# Patient Record
Sex: MALE | Race: WHITE | NOT HISPANIC OR LATINO | Employment: FULL TIME | URBAN - METROPOLITAN AREA
[De-identification: names, ages, dates, MRNs, and addresses within clinical notes are randomized per-mention and may not be internally consistent; named-entity substitution may affect disease eponyms.]

---

## 2017-02-02 ENCOUNTER — HOSPITAL ENCOUNTER (OUTPATIENT)
Dept: LAB | Facility: HOSPITAL | Age: 62
Discharge: HOME | End: 2017-02-02
Attending: INTERNAL MEDICINE | Admitting: INTERNAL MEDICINE

## 2017-05-31 ENCOUNTER — HOSPITAL ENCOUNTER (OUTPATIENT)
Dept: LAB | Facility: HOSPITAL | Age: 62
Discharge: HOME | End: 2017-05-31
Attending: INTERNAL MEDICINE | Admitting: INTERNAL MEDICINE

## 2017-05-31 LAB
CHOLEST SERPL-MCNC: 126 MG/DL
CHOLEST/HDLC SERPL: 5.3 {RATIO}
CONV LDL CHOLESTEROL DIRECT: 85 MG/DL (ref 0–100)
HDLC SERPL-MCNC: 24 MG/DL
LDLC/HDLC SERPL: 3.5 {RATIO}
LIPID INTERPRETATION: ABNORMAL
TRIGL SERPL-MCNC: 173 MG/DL
VLDLC SERPL CALC-MCNC: 17.4 MG/DL

## 2017-08-08 ENCOUNTER — HOSPITAL ENCOUNTER (OUTPATIENT)
Dept: HOSPITAL 83 - RESCLI | Age: 62
Discharge: HOME | End: 2017-08-08
Attending: INTERNAL MEDICINE
Payer: COMMERCIAL

## 2017-08-08 DIAGNOSIS — R07.89: Primary | ICD-10-CM

## 2017-10-19 ENCOUNTER — HOSPITAL ENCOUNTER (OUTPATIENT)
Dept: LAB | Facility: HOSPITAL | Age: 62
Discharge: HOME | End: 2017-10-19
Attending: INTERNAL MEDICINE | Admitting: INTERNAL MEDICINE

## 2017-11-22 ENCOUNTER — HOSPITAL ENCOUNTER (OUTPATIENT)
Dept: OTHER | Facility: HOSPITAL | Age: 62
Setting detail: SPECIMEN
Discharge: HOME | End: 2017-11-22
Attending: INTERNAL MEDICINE | Admitting: INTERNAL MEDICINE

## 2017-11-22 ENCOUNTER — ON CAMPUS - OUTPATIENT (AMBULATORY)
Dept: URBAN - METROPOLITAN AREA HOSPITAL 2 | Facility: HOSPITAL | Age: 62
End: 2017-11-22
Payer: COMMERCIAL

## 2017-11-22 ENCOUNTER — OFFICE (AMBULATORY)
Dept: URBAN - METROPOLITAN AREA CLINIC 64 | Facility: CLINIC | Age: 62
End: 2017-11-22
Payer: COMMERCIAL

## 2017-11-22 VITALS
HEART RATE: 69 BPM | DIASTOLIC BLOOD PRESSURE: 60 MMHG | HEART RATE: 63 BPM | OXYGEN SATURATION: 98 % | WEIGHT: 203 LBS | HEART RATE: 74 BPM | SYSTOLIC BLOOD PRESSURE: 130 MMHG | SYSTOLIC BLOOD PRESSURE: 123 MMHG | OXYGEN SATURATION: 97 % | DIASTOLIC BLOOD PRESSURE: 67 MMHG | OXYGEN SATURATION: 95 % | SYSTOLIC BLOOD PRESSURE: 98 MMHG | OXYGEN SATURATION: 96 % | OXYGEN SATURATION: 99 % | DIASTOLIC BLOOD PRESSURE: 85 MMHG | OXYGEN SATURATION: 93 % | HEIGHT: 65 IN | SYSTOLIC BLOOD PRESSURE: 121 MMHG | TEMPERATURE: 97.3 F | OXYGEN SATURATION: 94 % | HEART RATE: 73 BPM | SYSTOLIC BLOOD PRESSURE: 127 MMHG | SYSTOLIC BLOOD PRESSURE: 124 MMHG | DIASTOLIC BLOOD PRESSURE: 80 MMHG | SYSTOLIC BLOOD PRESSURE: 107 MMHG | DIASTOLIC BLOOD PRESSURE: 81 MMHG | DIASTOLIC BLOOD PRESSURE: 76 MMHG | SYSTOLIC BLOOD PRESSURE: 103 MMHG | DIASTOLIC BLOOD PRESSURE: 69 MMHG | HEART RATE: 72 BPM | SYSTOLIC BLOOD PRESSURE: 114 MMHG | RESPIRATION RATE: 18 BRPM | HEART RATE: 70 BPM | DIASTOLIC BLOOD PRESSURE: 73 MMHG | HEART RATE: 65 BPM | RESPIRATION RATE: 16 BRPM

## 2017-11-22 DIAGNOSIS — D12.0 BENIGN NEOPLASM OF CECUM: ICD-10-CM

## 2017-11-22 DIAGNOSIS — K62.89 OTHER SPECIFIED DISEASES OF ANUS AND RECTUM: ICD-10-CM

## 2017-11-22 DIAGNOSIS — K57.30 DIVERTICULOSIS OF LARGE INTESTINE WITHOUT PERFORATION OR ABS: ICD-10-CM

## 2017-11-22 DIAGNOSIS — K64.8 OTHER HEMORRHOIDS: ICD-10-CM

## 2017-11-22 DIAGNOSIS — D12.8 BENIGN NEOPLASM OF RECTUM: ICD-10-CM

## 2017-11-22 DIAGNOSIS — Z12.11 ENCOUNTER FOR SCREENING FOR MALIGNANT NEOPLASM OF COLON: ICD-10-CM

## 2017-11-22 PROBLEM — K62.1 RECTAL POLYP: Status: ACTIVE | Noted: 2017-11-22

## 2017-11-22 LAB
GI HISTOLOGY: A. UNSPECIFIED: (no result)
GI HISTOLOGY: B. UNSPECIFIED: (no result)
GI HISTOLOGY: PDF REPORT: (no result)

## 2017-11-22 PROCEDURE — 45385 COLONOSCOPY W/LESION REMOVAL: CPT | Mod: 33 | Performed by: INTERNAL MEDICINE

## 2017-11-22 PROCEDURE — 88305 TISSUE EXAM BY PATHOLOGIST: CPT | Performed by: INTERNAL MEDICINE

## 2017-11-22 RX ADMIN — PROPOFOL: 10 INJECTION, EMULSION INTRAVENOUS at 15:10

## 2018-04-18 ENCOUNTER — HOSPITAL ENCOUNTER (OUTPATIENT)
Dept: LAB | Facility: HOSPITAL | Age: 63
Discharge: HOME | End: 2018-04-18
Attending: INTERNAL MEDICINE | Admitting: INTERNAL MEDICINE

## 2018-04-18 LAB
CHOLEST SERPL-MCNC: 132 MG/DL
CHOLEST/HDLC SERPL: 6.3 {RATIO}
CONV LDL CHOLESTEROL DIRECT: 91 MG/DL (ref 0–100)
CONV MICROALBUM.,U,RANDOM: <2 MG/L
HDLC SERPL-MCNC: 21 MG/DL
LDLC/HDLC SERPL: 4.3 {RATIO}
LIPID INTERPRETATION: ABNORMAL
TRIGL SERPL-MCNC: 193 MG/DL
VLDLC SERPL CALC-MCNC: 20.3 MG/DL

## 2018-10-04 ENCOUNTER — HOSPITAL ENCOUNTER (OUTPATIENT)
Dept: LAB | Facility: HOSPITAL | Age: 63
Discharge: HOME | End: 2018-10-04
Attending: INTERNAL MEDICINE | Admitting: INTERNAL MEDICINE

## 2018-10-05 LAB — HBA1C MFR BLD: 8.3 % (ref 0–5.6)

## 2018-11-27 ENCOUNTER — HOSPITAL ENCOUNTER (OUTPATIENT)
Dept: HOSPITAL 83 - RESCLI | Age: 63
Discharge: HOME | End: 2018-11-27
Attending: EMERGENCY MEDICINE
Payer: COMMERCIAL

## 2018-11-27 DIAGNOSIS — Z23: Primary | ICD-10-CM

## 2019-01-04 ENCOUNTER — HOSPITAL ENCOUNTER (OUTPATIENT)
Dept: HOSPITAL 83 - RESCLI | Age: 64
Discharge: HOME | End: 2019-01-04
Attending: INTERNAL MEDICINE
Payer: COMMERCIAL

## 2019-01-04 DIAGNOSIS — K21.9: ICD-10-CM

## 2019-01-04 DIAGNOSIS — R19.7: ICD-10-CM

## 2019-01-04 DIAGNOSIS — M19.90: ICD-10-CM

## 2019-01-04 DIAGNOSIS — Z79.899: ICD-10-CM

## 2019-01-04 DIAGNOSIS — E78.5: ICD-10-CM

## 2019-01-04 DIAGNOSIS — F32.0: ICD-10-CM

## 2019-01-04 DIAGNOSIS — E11.9: ICD-10-CM

## 2019-01-04 DIAGNOSIS — Z88.8: ICD-10-CM

## 2019-01-04 DIAGNOSIS — I10: Primary | ICD-10-CM

## 2019-01-04 DIAGNOSIS — E03.9: ICD-10-CM

## 2019-01-04 DIAGNOSIS — Z86.73: ICD-10-CM

## 2019-01-04 DIAGNOSIS — E66.9: ICD-10-CM

## 2019-03-14 ENCOUNTER — HOSPITAL ENCOUNTER (OUTPATIENT)
Dept: LAB | Facility: HOSPITAL | Age: 64
Discharge: HOME | End: 2019-03-14
Attending: INTERNAL MEDICINE | Admitting: INTERNAL MEDICINE

## 2019-03-14 LAB
ALBUMIN SERPL-MCNC: 4.2 G/DL (ref 3.5–4.8)
ALBUMIN/GLOB SERPL: 1.4 {RATIO} (ref 1–1.7)
ALP SERPL-CCNC: 65 IU/L (ref 32–91)
ALT SERPL-CCNC: 41 IU/L (ref 17–63)
ANION GAP SERPL CALC-SCNC: 13.9 MMOL/L (ref 10–20)
AST SERPL-CCNC: 30 IU/L (ref 15–41)
BILIRUB SERPL-MCNC: 0.7 MG/DL (ref 0.3–1.2)
BUN SERPL-MCNC: 24 MG/DL (ref 8–20)
BUN/CREAT SERPL: 21.8 (ref 6.2–20.3)
CALCIUM SERPL-MCNC: 9.3 MG/DL (ref 8.9–10.3)
CHLORIDE SERPL-SCNC: 104 MMOL/L (ref 101–111)
CHOLEST SERPL-MCNC: 153 MG/DL
CHOLEST/HDLC SERPL: 5.5 {RATIO}
CONV CO2: 23 MMOL/L (ref 22–32)
CONV LDL CHOLESTEROL DIRECT: 113 MG/DL (ref 0–100)
CONV MICROALBUM.,U,RANDOM: <2 MG/L
CONV TOTAL PROTEIN: 7.2 G/DL (ref 6.1–7.9)
CREAT UR-MCNC: 1.1 MG/DL (ref 0.7–1.2)
GLOBULIN UR ELPH-MCNC: 3 G/DL (ref 2.5–3.8)
GLUCOSE SERPL-MCNC: 134 MG/DL (ref 65–99)
HDLC SERPL-MCNC: 28 MG/DL
LDLC/HDLC SERPL: 4.1 {RATIO}
LIPID INTERPRETATION: ABNORMAL
POTASSIUM SERPL-SCNC: 3.9 MMOL/L (ref 3.6–5.1)
SODIUM SERPL-SCNC: 137 MMOL/L (ref 136–144)
TRIGL SERPL-MCNC: 149 MG/DL
VLDLC SERPL CALC-MCNC: 11.8 MG/DL

## 2019-05-11 ENCOUNTER — HOSPITAL ENCOUNTER (OUTPATIENT)
Dept: HOSPITAL 83 - LAB | Age: 64
Discharge: HOME | End: 2019-05-11
Attending: INTERNAL MEDICINE
Payer: COMMERCIAL

## 2019-05-11 DIAGNOSIS — E11.9: Primary | ICD-10-CM

## 2019-05-11 LAB
ALBUMIN SERPL-MCNC: 3.9 GM/DL (ref 3.1–4.5)
ALP SERPL-CCNC: 111 U/L (ref 45–117)
ALT SERPL W P-5'-P-CCNC: 18 U/L (ref 12–78)
AST SERPL-CCNC: 9 IU/L (ref 3–35)
BASOPHILS # BLD AUTO: 0 10*3/UL (ref 0–0.1)
BASOPHILS NFR BLD AUTO: 0.9 % (ref 0–1)
BUN SERPL-MCNC: 23 MG/DL (ref 7–24)
CHLORIDE SERPL-SCNC: 109 MMOL/L (ref 98–107)
CHOLEST SERPL-MCNC: 135 MG/DL (ref ?–200)
CREAT SERPL-MCNC: 1.16 MG/DL (ref 0.7–1.3)
EOSINOPHIL # BLD AUTO: 0.2 10*3/UL (ref 0–0.4)
EOSINOPHIL # BLD AUTO: 4.3 % (ref 1–4)
ERYTHROCYTE [DISTWIDTH] IN BLOOD BY AUTOMATED COUNT: 12.9 % (ref 0–14.5)
HCT VFR BLD AUTO: 39 % (ref 42–52)
HDLC SERPL-MCNC: 47 MG/DL (ref 40–60)
HGB BLD-MCNC: 13.1 G/DL (ref 14–18)
LDLC SERPL DIRECT ASSAY-MCNC: 65 MG/DL (ref 9–159)
LYMPHOCYTES # BLD AUTO: 0.7 10*3/UL (ref 1.3–4.4)
LYMPHOCYTES NFR BLD AUTO: 14.8 % (ref 27–41)
MCH RBC QN AUTO: 29.6 PG (ref 27–31)
MCHC RBC AUTO-ENTMCNC: 33.6 G/DL (ref 33–37)
MCV RBC AUTO: 88 FL (ref 80–94)
MONOCYTES # BLD AUTO: 0.4 10*3/UL (ref 0.1–1)
MONOCYTES NFR BLD MANUAL: 7.7 % (ref 3–9)
NEUT #: 3.4 10*3/UL (ref 2.3–7.9)
NEUT %: 72.1 % (ref 47–73)
NRBC BLD QL AUTO: 0 10*3/UL (ref 0–0)
PLATELET # BLD AUTO: 201 10*3/UL (ref 130–400)
PMV BLD AUTO: 10.1 FL (ref 9.6–12.3)
POTASSIUM SERPL-SCNC: 4.6 MMOL/L (ref 3.5–5.1)
PROT SERPL-MCNC: 7.2 GM/DL (ref 6.4–8.2)
RBC # BLD AUTO: 4.43 10*6/UL (ref 4.5–5.9)
SODIUM SERPL-SCNC: 140 MMOL/L (ref 136–145)
TRIGL SERPL-MCNC: 115 MG/DL (ref ?–150)
VLDLC SERPL CALC-MCNC: 23 MG/DL (ref 6–40)
WBC NRBC COR # BLD AUTO: 4.7 10*3/UL (ref 4.8–10.8)

## 2019-05-21 ENCOUNTER — HOSPITAL ENCOUNTER (OUTPATIENT)
Dept: HOSPITAL 83 - RESCLI | Age: 64
Discharge: HOME | End: 2019-05-21
Attending: INTERNAL MEDICINE
Payer: COMMERCIAL

## 2019-05-21 DIAGNOSIS — Z88.8: ICD-10-CM

## 2019-05-21 DIAGNOSIS — R47.81: ICD-10-CM

## 2019-05-21 DIAGNOSIS — E03.9: Primary | ICD-10-CM

## 2019-05-21 DIAGNOSIS — R07.9: ICD-10-CM

## 2019-05-21 DIAGNOSIS — M19.90: ICD-10-CM

## 2019-05-21 DIAGNOSIS — Z91.048: ICD-10-CM

## 2019-05-21 DIAGNOSIS — I10: ICD-10-CM

## 2019-05-21 DIAGNOSIS — Z79.899: ICD-10-CM

## 2019-05-21 DIAGNOSIS — F32.0: ICD-10-CM

## 2019-05-21 DIAGNOSIS — E66.9: ICD-10-CM

## 2019-05-21 DIAGNOSIS — Z86.73: ICD-10-CM

## 2019-05-21 DIAGNOSIS — E11.9: ICD-10-CM

## 2019-05-21 DIAGNOSIS — J32.9: ICD-10-CM

## 2019-08-28 ENCOUNTER — APPOINTMENT (OUTPATIENT)
Dept: CT IMAGING | Facility: HOSPITAL | Age: 64
End: 2019-08-28

## 2019-08-28 ENCOUNTER — HOSPITAL ENCOUNTER (OUTPATIENT)
Dept: HOSPITAL 83 - RESCLI | Age: 64
Discharge: HOME | End: 2019-08-28
Attending: INTERNAL MEDICINE
Payer: COMMERCIAL

## 2019-08-28 ENCOUNTER — HOSPITAL ENCOUNTER (EMERGENCY)
Facility: HOSPITAL | Age: 64
Discharge: HOME | End: 2019-08-28
Admitting: EMERGENCY MEDICINE

## 2019-08-28 VITALS
RESPIRATION RATE: 14 BRPM | DIASTOLIC BLOOD PRESSURE: 68 MMHG | TEMPERATURE: 97.8 F | WEIGHT: 214.51 LBS | SYSTOLIC BLOOD PRESSURE: 144 MMHG | OXYGEN SATURATION: 98 % | BODY MASS INDEX: 35.74 KG/M2 | HEART RATE: 84 BPM | HEIGHT: 65 IN

## 2019-08-28 DIAGNOSIS — E03.9: ICD-10-CM

## 2019-08-28 DIAGNOSIS — M19.90: ICD-10-CM

## 2019-08-28 DIAGNOSIS — K21.9: ICD-10-CM

## 2019-08-28 DIAGNOSIS — F32.0: ICD-10-CM

## 2019-08-28 DIAGNOSIS — Z86.73: ICD-10-CM

## 2019-08-28 DIAGNOSIS — M54.41 ACUTE LOW BACK PAIN WITH RIGHT-SIDED SCIATICA, UNSPECIFIED BACK PAIN LATERALITY: Primary | ICD-10-CM

## 2019-08-28 DIAGNOSIS — J32.9: ICD-10-CM

## 2019-08-28 DIAGNOSIS — I10: ICD-10-CM

## 2019-08-28 DIAGNOSIS — E11.9: Primary | ICD-10-CM

## 2019-08-28 DIAGNOSIS — Z79.899: ICD-10-CM

## 2019-08-28 PROCEDURE — 99283 EMERGENCY DEPT VISIT LOW MDM: CPT

## 2019-08-28 PROCEDURE — 72131 CT LUMBAR SPINE W/O DYE: CPT

## 2019-08-28 PROCEDURE — 25010000002 METHYLPREDNISOLONE PER 125 MG

## 2019-08-28 PROCEDURE — 25010000002 KETOROLAC TROMETHAMINE PER 15 MG: Performed by: PHYSICIAN ASSISTANT

## 2019-08-28 PROCEDURE — 96372 THER/PROPH/DIAG INJ SC/IM: CPT

## 2019-08-28 RX ORDER — LIDOCAINE 50 MG/G
1 PATCH TOPICAL EVERY 24 HOURS
Qty: 6 EACH | Refills: 0 | Status: SHIPPED | OUTPATIENT
Start: 2019-08-28 | End: 2019-10-01

## 2019-08-28 RX ORDER — METHYLPREDNISOLONE SODIUM SUCCINATE 125 MG/2ML
125 INJECTION, POWDER, LYOPHILIZED, FOR SOLUTION INTRAMUSCULAR; INTRAVENOUS ONCE
Status: COMPLETED | OUTPATIENT
Start: 2019-08-28 | End: 2019-08-28

## 2019-08-28 RX ORDER — CEPHALEXIN 500 MG/1
500 CAPSULE ORAL 2 TIMES DAILY
COMMUNITY
End: 2020-09-14

## 2019-08-28 RX ORDER — NATEGLINIDE 120 MG/1
120 TABLET ORAL
COMMUNITY
End: 2020-03-17

## 2019-08-28 RX ORDER — TOPIRAMATE 50 MG/1
150 TABLET, FILM COATED ORAL 2 TIMES DAILY
COMMUNITY
End: 2021-03-22

## 2019-08-28 RX ORDER — ENALAPRIL MALEATE 20 MG/1
20 TABLET ORAL DAILY
COMMUNITY
End: 2021-03-22 | Stop reason: SDUPTHER

## 2019-08-28 RX ORDER — PREDNISONE 20 MG/1
20 TABLET ORAL DAILY
Qty: 5 TABLET | Refills: 0 | Status: SHIPPED | OUTPATIENT
Start: 2019-08-28 | End: 2019-10-01

## 2019-08-28 RX ORDER — METHOCARBAMOL 750 MG/1
750 TABLET, FILM COATED ORAL 3 TIMES DAILY PRN
Qty: 15 TABLET | Refills: 0 | Status: SHIPPED | OUTPATIENT
Start: 2019-08-28 | End: 2019-10-01

## 2019-08-28 RX ORDER — SIMVASTATIN 40 MG
40 TABLET ORAL NIGHTLY
COMMUNITY
End: 2021-03-22 | Stop reason: SDUPTHER

## 2019-08-28 RX ORDER — INSULIN DEGLUDEC 100 U/ML
80 INJECTION, SOLUTION SUBCUTANEOUS DAILY
COMMUNITY
End: 2020-03-17

## 2019-08-28 RX ORDER — KETOROLAC TROMETHAMINE 30 MG/ML
30 INJECTION, SOLUTION INTRAMUSCULAR; INTRAVENOUS ONCE
Status: COMPLETED | OUTPATIENT
Start: 2019-08-28 | End: 2019-08-28

## 2019-08-28 RX ORDER — METHYLPREDNISOLONE SODIUM SUCCINATE 125 MG/2ML
INJECTION, POWDER, LYOPHILIZED, FOR SOLUTION INTRAMUSCULAR; INTRAVENOUS
Status: COMPLETED
Start: 2019-08-28 | End: 2019-08-28

## 2019-08-28 RX ORDER — NAPROXEN 500 MG/1
500 TABLET ORAL 2 TIMES DAILY WITH MEALS
Qty: 20 TABLET | Refills: 0 | Status: SHIPPED | OUTPATIENT
Start: 2019-08-28 | End: 2019-10-01

## 2019-08-28 RX ORDER — BIOTIN 5 MG
3500 TABLET ORAL 2 TIMES DAILY
COMMUNITY
End: 2020-03-17

## 2019-08-28 RX ORDER — METHOCARBAMOL 750 MG/1
750 TABLET, FILM COATED ORAL ONCE
Status: COMPLETED | OUTPATIENT
Start: 2019-08-28 | End: 2019-08-28

## 2019-08-28 RX ADMIN — METHYLPREDNISOLONE SODIUM SUCCINATE 125 MG: 125 INJECTION, POWDER, FOR SOLUTION INTRAMUSCULAR; INTRAVENOUS at 21:10

## 2019-08-28 RX ADMIN — METHOCARBAMOL 750 MG: 750 TABLET, FILM COATED ORAL at 21:10

## 2019-08-28 RX ADMIN — KETOROLAC TROMETHAMINE 30 MG: 30 INJECTION, SOLUTION INTRAMUSCULAR at 21:10

## 2019-08-28 RX ADMIN — METHYLPREDNISOLONE SODIUM SUCCINATE 125 MG: 125 INJECTION, POWDER, LYOPHILIZED, FOR SOLUTION INTRAMUSCULAR; INTRAVENOUS at 21:10

## 2019-09-23 ENCOUNTER — HOSPITAL ENCOUNTER (OUTPATIENT)
Dept: HOSPITAL 83 - LAB | Age: 64
Discharge: HOME | End: 2019-09-23
Attending: STUDENT IN AN ORGANIZED HEALTH CARE EDUCATION/TRAINING PROGRAM
Payer: COMMERCIAL

## 2019-09-23 DIAGNOSIS — E11.9: ICD-10-CM

## 2019-09-23 DIAGNOSIS — E03.9: Primary | ICD-10-CM

## 2019-09-23 LAB
BUN SERPL-MCNC: 22 MG/DL (ref 7–24)
CHLORIDE SERPL-SCNC: 110 MMOL/L (ref 98–107)
CREAT SERPL-MCNC: 1.11 MG/DL (ref 0.7–1.3)
POTASSIUM SERPL-SCNC: 4.4 MMOL/L (ref 3.5–5.1)
SODIUM SERPL-SCNC: 141 MMOL/L (ref 136–145)

## 2019-10-01 ENCOUNTER — OFFICE VISIT (OUTPATIENT)
Dept: ORTHOPEDIC SURGERY | Facility: CLINIC | Age: 64
End: 2019-10-01

## 2019-10-01 VITALS
HEART RATE: 64 BPM | DIASTOLIC BLOOD PRESSURE: 74 MMHG | HEIGHT: 65 IN | WEIGHT: 200 LBS | BODY MASS INDEX: 33.32 KG/M2 | SYSTOLIC BLOOD PRESSURE: 116 MMHG

## 2019-10-01 DIAGNOSIS — M54.32 BILATERAL SCIATICA: ICD-10-CM

## 2019-10-01 DIAGNOSIS — M48.062 LUMBAR STENOSIS WITH NEUROGENIC CLAUDICATION: Primary | ICD-10-CM

## 2019-10-01 DIAGNOSIS — M54.31 BILATERAL SCIATICA: ICD-10-CM

## 2019-10-01 PROCEDURE — 99214 OFFICE O/P EST MOD 30 MIN: CPT | Performed by: PHYSICIAN ASSISTANT

## 2019-10-01 PROCEDURE — 96372 THER/PROPH/DIAG INJ SC/IM: CPT | Performed by: PHYSICIAN ASSISTANT

## 2019-10-01 RX ORDER — GABAPENTIN 300 MG/1
CAPSULE ORAL
Qty: 60 CAPSULE | Refills: 0 | Status: SHIPPED | OUTPATIENT
Start: 2019-10-01 | End: 2019-10-23 | Stop reason: SDUPTHER

## 2019-10-01 RX ORDER — DICLOFENAC SODIUM 75 MG/1
75 TABLET, DELAYED RELEASE ORAL 2 TIMES DAILY
Qty: 60 TABLET | Refills: 0 | Status: SHIPPED | OUTPATIENT
Start: 2019-10-01 | End: 2020-03-17

## 2019-10-01 NOTE — PATIENT INSTRUCTIONS
Back Exercises  The following exercises strengthen the muscles that help to support the back. They also help to keep the lower back flexible. Doing these exercises can help to prevent back pain or lessen existing pain.  If you have back pain or discomfort, try doing these exercises 2-3 times each day or as told by your health care provider. When the pain goes away, do them once each day, but increase the number of times that you repeat the steps for each exercise (do more repetitions). If you do not have back pain or discomfort, do these exercises once each day or as told by your health care provider.  Exercises  Single Knee to Chest  Repeat these steps 3-5 times for each le. Lie on your back on a firm bed or the floor with your legs extended.  2. Bring one knee to your chest. Your other leg should stay extended and in contact with the floor.  3. Hold your knee in place by grabbing your knee or thigh.  4. Pull on your knee until you feel a gentle stretch in your lower back.  5. Hold the stretch for 10-30 seconds.  6. Slowly release and straighten your leg.  Pelvic Tilt  Repeat these steps 5-10 times:  1. Lie on your back on a firm bed or the floor with your legs extended.  2. Bend your knees so they are pointing toward the ceiling and your feet are flat on the floor.  3. Tighten your lower abdominal muscles to press your lower back against the floor. This motion will tilt your pelvis so your tailbone points up toward the ceiling instead of pointing to your feet or the floor.  4. With gentle tension and even breathing, hold this position for 5-10 seconds.  Cat-Cow  Repeat these steps until your lower back becomes more flexible:  1. Get into a hands-and-knees position on a firm surface. Keep your hands under your shoulders, and keep your knees under your hips. You may place padding under your knees for comfort.  2. Let your head hang down, and point your tailbone toward the floor so your lower back becomes  rounded like the back of a cat.  3. Hold this position for 5 seconds.  4. Slowly lift your head and point your tailbone up toward the ceiling so your back forms a sagging arch like the back of a cow.  5. Hold this position for 5 seconds.    Press-Ups  Repeat these steps 5-10 times:  1. Lie on your abdomen (face-down) on the floor.  2. Place your palms near your head, about shoulder-width apart.  3. While you keep your back as relaxed as possible and keep your hips on the floor, slowly straighten your arms to raise the top half of your body and lift your shoulders. Do not use your back muscles to raise your upper torso. You may adjust the placement of your hands to make yourself more comfortable.  4. Hold this position for 5 seconds while you keep your back relaxed.  5. Slowly return to lying flat on the floor.    Bridges  Repeat these steps 10 times:  1. Lie on your back on a firm surface.  2. Bend your knees so they are pointing toward the ceiling and your feet are flat on the floor.  3. Tighten your buttocks muscles and lift your buttocks off of the floor until your waist is at almost the same height as your knees. You should feel the muscles working in your buttocks and the back of your thighs. If you do not feel these muscles, slide your feet 1-2 inches farther away from your buttocks.  4. Hold this position for 3-5 seconds.  5. Slowly lower your hips to the starting position, and allow your buttocks muscles to relax completely.  If this exercise is too easy, try doing it with your arms crossed over your chest.  Abdominal Crunches  Repeat these steps 5-10 times:  1. Lie on your back on a firm bed or the floor with your legs extended.  2. Bend your knees so they are pointing toward the ceiling and your feet are flat on the floor.  3. Cross your arms over your chest.  4. Tip your chin slightly toward your chest without bending your neck.  5. Tighten your abdominal muscles and slowly raise your trunk (torso) high  enough to lift your shoulder blades a tiny bit off of the floor. Avoid raising your torso higher than that, because it can put too much stress on your low back and it does not help to strengthen your abdominal muscles.  6. Slowly return to your starting position.  Back Lifts  Repeat these steps 5-10 times:  1. Lie on your abdomen (face-down) with your arms at your sides, and rest your forehead on the floor.  2. Tighten the muscles in your legs and your buttocks.  3. Slowly lift your chest off of the floor while you keep your hips pressed to the floor. Keep the back of your head in line with the curve in your back. Your eyes should be looking at the floor.  4. Hold this position for 3-5 seconds.  5. Slowly return to your starting position.  Contact a health care provider if:  · Your back pain or discomfort gets much worse when you do an exercise.  · Your back pain or discomfort does not lessen within 2 hours after you exercise.  If you have any of these problems, stop doing these exercises right away. Do not do them again unless your health care provider says that you can.  Get help right away if:  · You develop sudden, severe back pain. If this happens, stop doing the exercises right away. Do not do them again unless your health care provider says that you can.  This information is not intended to replace advice given to you by your health care provider. Make sure you discuss any questions you have with your health care provider.  Document Released: 01/25/2006 Document Revised: 07/31/2018 Document Reviewed: 02/11/2016  [employer] Interactive Patient Education © 2019 [employer]

## 2019-10-01 NOTE — PROGRESS NOTES
[facility] New patient    Patient Name: Herb Mariscal    History of Present Illness:  Herb Mariscal is a 63 y.o. year old male here today with chief complaint of had concerns including Initial Evaluation and Pain of the Lumbar Spine..2 months of increasing low back pain occ shooting pain in legs and buttock when walking or lifting. Pain gets better when he sits. No hx of back surgery. Nor change in bowel or bladder functions.no hx of cardiovasucalr or peripheral vasuclar disease.  Answers for HPI/ROS submitted by the patient on 9/25/2019   Back pain  Chronicity: new  Onset: more than 1 month ago  Frequency: constantly  Progression since onset: waxing and waning  Pain location: gluteal, sacro-iliac  Pain quality: stabbing  Radiates to: left foot, left thigh, right foot, right thigh  Pain - numeric: 9/10  Pain is: the same all the time  Aggravated by: position, standing  Stiffness is present: all day  bladder incontinence: No  bowel incontinence: No  leg pain: Yes  paresis: No  paresthesias: Yes  pelvic pain: Yes  perianal numbness: No  tingling: No  weight loss: No  Risk factors: obesity    Imaging: My interpretation of CT scan of lumbar spine is L4-S1 stenosis S1-S2 rudamentary disc.    Impression:   1. Lumbar stenosis with neurogenic claudication    2. Bilateral sciatica           Plan: Physical therapy for lumbar traction, gabapentin, diclofenac, intramuscular steroid injection, off work until Saturday.  Follow-up in 6 weeks.    Therapeutic injection of 80 mg of Demerol was performed in the office.  Patient denies change in immediate side effects.    Methylprednisolone 80 mg  NDC 8019-1545-88  LOT #[id number]  Expiration 11/20    Vitals:  Vitals:    10/01/19 0920   BP: 116/74   Pulse: 64       Patient's Family and Social History:  Family History   Problem Relation Age of Onset   • Cancer Father    • Diabetes Brother      Social History     Socioeconomic History   • Marital status:       Spouse name: Not on file   • Number of children: Not on file   • Years of education: Not on file   • Highest education level: Not on file   Tobacco Use   • Smoking status: Former Smoker   • Smokeless tobacco: Never Used   Substance and Sexual Activity   • Alcohol use: No     Frequency: Never   • Drug use: No   • Sexual activity: Defer       Patients Medical and Surgical History:  Past Medical History:   Diagnosis Date   • Diabetes mellitus (CMS/HCC)     type 2     Past Surgical History:   Procedure Laterality Date   • HAND SURGERY  1990       Review of Systems   Constitutional: Positive for activity change. Negative for fatigue.   HENT: Negative for congestion.    Eyes: Negative for visual disturbance.   Respiratory: Negative for shortness of breath.    Cardiovascular: Negative for chest pain.   Gastrointestinal: Negative for abdominal pain.   Genitourinary: Negative for dysuria and flank pain.   Musculoskeletal: Positive for arthralgias and back pain.   Skin: Negative for wound.   Neurological: Positive for weakness and numbness. Negative for headaches.   Psychiatric/Behavioral: Negative for behavioral problems. The patient is not nervous/anxious.        Physical Exam   Constitutional: He is oriented to person, place, and time. He appears well-developed.   HENT:   Head: Normocephalic and atraumatic.   Eyes: Conjunctivae are normal.   Neck: Normal range of motion.   Cardiovascular: Normal rate.   Pulmonary/Chest: Effort normal.   Abdominal: There is no guarding.   Musculoskeletal: He exhibits tenderness.        Right hip: Normal.        Left hip: Normal.        Lumbar back: He exhibits decreased range of motion, tenderness and pain.   Neurological: He is oriented to person, place, and time.   Skin: Skin is warm.   Psychiatric: His behavior is normal.   Vitals reviewed.    Ortho Exam    Orders Placed This Encounter   Procedures   • Ambulatory Referral to Physical Therapy Evaluate and treat; Soft Tissue Mobilizaton  (lumbar traction); ROM     Referral Priority:   Routine     Referral Type:   Therapy     Referral Reason:   Specialty Services Required     Requested Specialty:   Physical Therapy     Number of Visits Requested:   1

## 2019-10-04 ENCOUNTER — HOSPITAL ENCOUNTER (OUTPATIENT)
Dept: HOSPITAL 83 - RESCLI | Age: 64
Discharge: HOME | End: 2019-10-04
Attending: INTERNAL MEDICINE
Payer: COMMERCIAL

## 2019-10-04 DIAGNOSIS — I10: Primary | ICD-10-CM

## 2019-10-04 DIAGNOSIS — E03.9: ICD-10-CM

## 2019-10-04 DIAGNOSIS — E11.9: ICD-10-CM

## 2019-10-04 DIAGNOSIS — M19.90: ICD-10-CM

## 2019-10-04 DIAGNOSIS — Z79.899: ICD-10-CM

## 2019-10-04 DIAGNOSIS — Z86.73: ICD-10-CM

## 2019-10-04 DIAGNOSIS — J32.9: ICD-10-CM

## 2019-10-04 DIAGNOSIS — E66.9: ICD-10-CM

## 2019-10-04 DIAGNOSIS — F32.0: ICD-10-CM

## 2019-10-09 ENCOUNTER — TREATMENT (OUTPATIENT)
Dept: PHYSICAL THERAPY | Facility: CLINIC | Age: 64
End: 2019-10-09

## 2019-10-09 DIAGNOSIS — M54.50 ACUTE BILATERAL LOW BACK PAIN, UNSPECIFIED WHETHER SCIATICA PRESENT: ICD-10-CM

## 2019-10-09 DIAGNOSIS — M48.062 SPINAL STENOSIS OF LUMBAR REGION WITH NEUROGENIC CLAUDICATION: Primary | ICD-10-CM

## 2019-10-09 PROCEDURE — 97140 MANUAL THERAPY 1/> REGIONS: CPT | Performed by: PHYSICAL THERAPIST

## 2019-10-09 PROCEDURE — 97014 ELECTRIC STIMULATION THERAPY: CPT | Performed by: PHYSICAL THERAPIST

## 2019-10-09 PROCEDURE — 97110 THERAPEUTIC EXERCISES: CPT | Performed by: PHYSICAL THERAPIST

## 2019-10-09 PROCEDURE — 97162 PT EVAL MOD COMPLEX 30 MIN: CPT | Performed by: PHYSICAL THERAPIST

## 2019-10-09 NOTE — PROGRESS NOTES
"Physical Therapy Initial Evaluation and Plan of Care    Patient: Herb Mariscal   : 1955  Diagnosis/ICD-10 Code:  Spinal stenosis of lumbar region with neurogenic claudication [M48.062]  Referring practitioner: RIOS Dejesus  Date of Initial Visit: 10/9/2019  Today's Date: 10/9/2019  Patient seen for 1 sessions           Subjective Questionnaire: Oswestry: 36% impairment       Subjective Evaluation    History of Present Illness  Mechanism of injury: Pt reports he has been having trouble with his back and his legs. His legs have been giving out. Ankles feel stiff. This began ~2 months ago. No specific LAURYN known. 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 Today when he coughed he felt like legs wanted to give out on him. Pt initially saw NP and didn't help. Got to the point he couldn't walk and he went to ER. Then saw MD and then to Neuro specialist. Had CT scan which showed some arthritis and some narrowing in spinal canal. Pt was given shot ~1 week ago and gabapentin which helped some but now pain and symptoms seem to be coming back. Pt reports some n/t in toes but that's from diabetes, no new paresthesia. No issues with bowel or bladder control. No saddle paresthesia. \"Bruise\" pain in groin only on the R and that's when pain begins and also causes legs to want to give out. No issues with back besides this. No h/o surgeries on low back.     MD follow-up in 6 weeks.     Pain  Current pain rating: 3  At best pain ratin  At worst pain ratin             Objective       Postural Observations  Seated posture: fair  Standing posture: fair        Tenderness     Lumbar Spine  No tenderness in the spinous process.     Right Hip   No tenderness in the PSIS.     Additional Tenderness Details  Reproduction of pain into buttocks with palpation of R PSIS    Neurological Testing     Sensation     Lumbar   Left   Intact: light touch    Right   Intact: light touch    Reflexes   Left   Patellar (L4): " absent (0)  Achilles (S1): absent (0)  Babinski sign: negative  Clonus sign: negative    Right   Patellar (L4): absent (0)  Achilles (S1): absent (0)  Babinski sign: negative  Clonus sign: negative    Additional Neurological Details  Unable to reproduce LE reflexes this date    Active Range of Motion     Lumbar   Flexion: WFL  Extension: WFL  Left lateral flexion: WFL  Right lateral flexion: WFL  Left rotation: WFL  Right rotation: WFL    Additional Active Range of Motion Details  AROM of lumbar spine was WFL. Pain noted with R lateral flexion and rotation B.     Strength/Myotome Testing     Lumbar   Left   Normal strength    Right   Normal strength    Additional Strength Details  No weakness noted with MMT this date    Muscle Activation     Additional Muscle Activation Details  Decreased activation of transverse abdominis    Tests     Lumbar     Left   Negative passive SLR.     Right   Negative passive SLR.     Additional Tests Details  Increased pain in buttocks with PA's to lumbar spine near L4-5  Decreased mobility along lumbar spine noted with flexion and lateral flexion PIVMs         Assessment & Plan     Assessment  Impairments: abnormal coordination, abnormal gait, abnormal muscle firing, abnormal muscle tone, abnormal or restricted ROM, activity intolerance, impaired balance, lacks appropriate home exercise program, pain with function and safety issue  Assessment details: Pt is a 64 year old male with c/o LBP and radiating symptoms down B LE's. Pt displays good strength during MMT. Sensation to light-touch was intact along L3-S1 dermatomes. Babinski and clonus tests were neg B. Pt demonstrates some pain in lumbar spine with R lateral flexion and B rotation. Pt displays reproduction of pain with palpation of R PSIS and with PA's to lower lumbar. Pt displays no change in pain after long axis hip distraction B. Decreased activation of transverse abdominis noted.   Prognosis: good  Functional Limitations:  carrying objects, lifting, sleeping, walking, pushing, uncomfortable because of pain, standing and stooping  Goals  Plan Goals: STG:  Pt will demonstrate increased core stabilization as well as postural awareness within 3 weeks.     Pt will display improved ROM of lumbar spine to WFL with min to no pain within 3 weeks.     LTG:  Pt will be independent with home exercises within 6 weeks to assist with pain management and continue to improve function.     Pt will demonstrate decreased score on the Modified Oswestry to 15% within 6 weeks to demonstrate decreased impairment.     Pt will be able to ambulate for >45 mins with min to no pain within 6 weeks.     Pt will be able to perform ADL's and other daily tasks with proper mechanics and min to no pain within 6 weeks.       Plan  Therapy options: will be seen for skilled physical therapy services  Other planned modality interventions: modalities as needed  Planned therapy interventions: abdominal trunk stabilization, ADL retraining, balance/weight-bearing training, flexibility, functional ROM exercises, gait training, home exercise program, joint mobilization, manual therapy, motor coordination training, neuromuscular re-education, postural training, soft tissue mobilization, spinal/joint mobilization, strengthening, stretching and therapeutic activities  Plan details: 30 visits per POC 90 day certification period          Eval:  Low Eval          Mins  26586  Mod Eval     15     Mins  51495  High Eval                            Mins  03777  Re-Eval                               mins  46381    Timed:         Manual Therapy:    10     mins  46226;     Therapeutic Exercise:    15     mins  06172;     Neuromuscular Ajay:        mins  24793;    Therapeutic Activity:          mins  10281;     Gait Training:           mins  00217;     Ultrasound:          mins  33470;    Ionto                                   mins   33806  Self Care                            mins    63074    Un-Timed:  Electrical Stimulation:    15     mins  93717 ( );  Traction          mins 25897      Timed Treatment:   25   mins   Total Treatment:     55   mins    PT SIGNATURE: Fartun King PT, DPT, OCS           [place] License # [id number]   DATE TREATMENT INITIATED: 10/9/2019    Initial Certification  Certification Period: 1/7/2020  I certify that the therapy services are furnished while this patient is under my care.  The services outlined above are required by this patient, and will be reviewed every 90 days.     PHYSICIAN: Keith Raines PA      DATE:     Please sign and return via fax to  .. Thank you, [facility].

## 2019-10-11 ENCOUNTER — TREATMENT (OUTPATIENT)
Dept: PHYSICAL THERAPY | Facility: CLINIC | Age: 64
End: 2019-10-11

## 2019-10-11 DIAGNOSIS — M54.50 ACUTE BILATERAL LOW BACK PAIN, UNSPECIFIED WHETHER SCIATICA PRESENT: ICD-10-CM

## 2019-10-11 DIAGNOSIS — M48.062 SPINAL STENOSIS OF LUMBAR REGION WITH NEUROGENIC CLAUDICATION: Primary | ICD-10-CM

## 2019-10-11 PROCEDURE — 97140 MANUAL THERAPY 1/> REGIONS: CPT | Performed by: PHYSICAL THERAPIST

## 2019-10-11 PROCEDURE — 97014 ELECTRIC STIMULATION THERAPY: CPT | Performed by: PHYSICAL THERAPIST

## 2019-10-11 PROCEDURE — 97110 THERAPEUTIC EXERCISES: CPT | Performed by: PHYSICAL THERAPIST

## 2019-10-11 NOTE — PROGRESS NOTES
Physical Therapy Daily Progress Note    VISIT#: 2    Subjective   Herb Mariscal reports: that legs have been sore but everything else is the same. No issues after last session.       Objective     See Exercise, Manual, and Modality Logs for complete treatment.       Assessment & Plan     Assessment  Assessment details: Pt demonstrated muscle spasming in upper thigh on the R after long axis hip distraction and manual therapy on R was stopped. Spasming stopped and attempted manual on the L, however R thigh began to spasm again. Attempted some exercises, however R thigh continued to spasm. It did go away with sitting. Pt noted that legs were cold for a few minutes when he initially sat up, however that went away. Modalities were performed in sitting with no issues. Discussed possibly trying mechanical traction NV if no increase in symptoms or spasms. Pt was not having spasms, coldness or any other symptoms in LE's at end of therapy session. Pt instructed to call PT office or Physician office if spasms continue or any other symptoms worsen, however pt reported that he would probably not call.           Progress per Plan of Care            Timed:         Manual Therapy:    8     mins  65175;     Therapeutic Exercise:    15     mins  05741;     Neuromuscular Ajay:        mins  55984;    Therapeutic Activity:          mins  63400;     Gait Training:           mins  25391;     Ultrasound:          mins  68566;    Ionto                                   mins   37077  Self Care                            mins   22038    Un-Timed:  Electrical Stimulation:    15     mins  74104 ( );  Traction          mins 17371    Timed Treatment:   23   mins   Total Treatment:     38   mins    Fartun King, PT, DPT, OCS  [place] License # [id number]  Physical Therapist

## 2019-10-15 ENCOUNTER — TREATMENT (OUTPATIENT)
Dept: PHYSICAL THERAPY | Facility: CLINIC | Age: 64
End: 2019-10-15

## 2019-10-15 DIAGNOSIS — M54.50 ACUTE BILATERAL LOW BACK PAIN, UNSPECIFIED WHETHER SCIATICA PRESENT: ICD-10-CM

## 2019-10-15 DIAGNOSIS — M48.062 SPINAL STENOSIS OF LUMBAR REGION WITH NEUROGENIC CLAUDICATION: Primary | ICD-10-CM

## 2019-10-15 PROCEDURE — 97014 ELECTRIC STIMULATION THERAPY: CPT | Performed by: PHYSICAL THERAPIST

## 2019-10-15 PROCEDURE — 97110 THERAPEUTIC EXERCISES: CPT | Performed by: PHYSICAL THERAPIST

## 2019-10-15 NOTE — PROGRESS NOTES
"Physical Therapy Daily Progress Note    VISIT#: 3    Subjective   Herb Mariscal reports: that he didn't have any more spasms in legs after last session. He has had more soreness in R leg than in L leg since last visit. He did hurt more after last session but it's better today.       Objective     See Exercise, Manual, and Modality Logs for complete treatment.       Assessment & Plan     Assessment  Assessment details: Pt demonstrates difficulty with core stabilization, however is improving. Babinski and clonus were reassessed and were still neg B. Attempted to reassess LE reflexes and unable to produce L4 or S1, however pt reports he \"doesn't have any\" and pt seemed to be guarding. Mechanical traction was attempted at 30# for 5 mins and pt noted less \"tightness\" in ankles as well as decreased pain in lumbar spine. Pt tolerated all treatment well this date.            Progress per Plan of Care            Timed:         Manual Therapy:         mins  72185;     Therapeutic Exercise:    15     mins  08156;     Neuromuscular Ajay:        mins  52326;    Therapeutic Activity:          mins  63434;     Gait Training:           mins  78329;     Ultrasound:          mins  08793;    Ionto                                   mins   29066  Self Care                            mins   98262    Un-Timed:  Electrical Stimulation:    15     mins  01859 ( );  Traction     5     mins 85961    Timed Treatment:   15   mins   Total Treatment:     35   mins    Fartun King, PT, DPT, OCS  [place] License # [id number]  Physical Therapist  "

## 2019-10-17 ENCOUNTER — TREATMENT (OUTPATIENT)
Dept: PHYSICAL THERAPY | Facility: CLINIC | Age: 64
End: 2019-10-17

## 2019-10-17 DIAGNOSIS — M54.50 ACUTE BILATERAL LOW BACK PAIN, UNSPECIFIED WHETHER SCIATICA PRESENT: ICD-10-CM

## 2019-10-17 DIAGNOSIS — M48.062 SPINAL STENOSIS OF LUMBAR REGION WITH NEUROGENIC CLAUDICATION: Primary | ICD-10-CM

## 2019-10-17 PROCEDURE — 97110 THERAPEUTIC EXERCISES: CPT | Performed by: PHYSICAL THERAPIST

## 2019-10-17 PROCEDURE — 97012 MECHANICAL TRACTION THERAPY: CPT | Performed by: PHYSICAL THERAPIST

## 2019-10-17 PROCEDURE — 97014 ELECTRIC STIMULATION THERAPY: CPT | Performed by: PHYSICAL THERAPIST

## 2019-10-17 NOTE — PROGRESS NOTES
Physical Therapy Daily Progress Note    VISIT#: 4    Subjective   Herb Mariscal reports: that traction seemed to really help last time. He had less pain after he left that lasted until yesterday. He then had some increased pain in L leg. His ankles haven't felt as stiff either.       Objective     See Exercise, Manual, and Modality Logs for complete treatment.       Assessment & Plan     Assessment  Assessment details: Pt demonstrates decreased pain with traction and e-stim. Pt tolerated added time and weight well during traction. Pt displays improved core stabilization.           Progress per Plan of Care            Timed:         Manual Therapy:         mins  18146;     Therapeutic Exercise:    15     mins  80853;     Neuromuscular Ajay:        mins  05678;    Therapeutic Activity:          mins  20243;     Gait Training:           mins  22428;     Ultrasound:          mins  35911;    Ionto                                   mins   39850  Self Care                            mins   58918    Un-Timed:  Electrical Stimulation:    15     mins  61808 ( );  Traction     15     mins 40026    Timed Treatment:   15   mins   Total Treatment:     45   mins    Fartun King, PT, DPT, OCS  [place] License # [id number]  Physical Therapist

## 2019-10-22 ENCOUNTER — TREATMENT (OUTPATIENT)
Dept: PHYSICAL THERAPY | Facility: CLINIC | Age: 64
End: 2019-10-22

## 2019-10-22 DIAGNOSIS — M54.50 ACUTE BILATERAL LOW BACK PAIN, UNSPECIFIED WHETHER SCIATICA PRESENT: ICD-10-CM

## 2019-10-22 DIAGNOSIS — M48.062 SPINAL STENOSIS OF LUMBAR REGION WITH NEUROGENIC CLAUDICATION: Primary | ICD-10-CM

## 2019-10-22 PROCEDURE — 97014 ELECTRIC STIMULATION THERAPY: CPT | Performed by: PHYSICAL THERAPIST

## 2019-10-22 PROCEDURE — 97110 THERAPEUTIC EXERCISES: CPT | Performed by: PHYSICAL THERAPIST

## 2019-10-22 PROCEDURE — 97012 MECHANICAL TRACTION THERAPY: CPT | Performed by: PHYSICAL THERAPIST

## 2019-10-22 NOTE — PROGRESS NOTES
Physical Therapy Daily Progress Note    VISIT#: 5    Subjective   Herb Mariscal reports: that traction didn't seem to help as much after last time. He felt okay after traction. He has felt like he has shin splints in the last few days.       Objective     See Exercise, Manual, and Modality Logs for complete treatment.       Assessment & Plan     Assessment  Assessment details: Pt demonstrates improved core stabilization, however had difficulty with added ER of hips. Pt tolerated modalities well at end of session.           Progress per Plan of Care            Timed:         Manual Therapy:         mins  92432;     Therapeutic Exercise:    18     mins  79430;     Neuromuscular Ajay:        mins  79178;    Therapeutic Activity:          mins  96157;     Gait Training:           mins  53895;     Ultrasound:          mins  02426;    Ionto                                   mins   54998  Self Care                            mins   95036    Un-Timed:  Electrical Stimulation:    15     mins  95086 ( );  Traction      13    mins 41558    Timed Treatment:   18   mins   Total Treatment:     46   mins    Fartun King, PT, DPT, OCS  [place] License # [id number]  Physical Therapist

## 2019-10-25 ENCOUNTER — TREATMENT (OUTPATIENT)
Dept: PHYSICAL THERAPY | Facility: CLINIC | Age: 64
End: 2019-10-25

## 2019-10-25 DIAGNOSIS — M54.50 ACUTE BILATERAL LOW BACK PAIN, UNSPECIFIED WHETHER SCIATICA PRESENT: ICD-10-CM

## 2019-10-25 DIAGNOSIS — M48.062 SPINAL STENOSIS OF LUMBAR REGION WITH NEUROGENIC CLAUDICATION: Primary | ICD-10-CM

## 2019-10-25 PROCEDURE — 97012 MECHANICAL TRACTION THERAPY: CPT | Performed by: PHYSICAL THERAPIST

## 2019-10-25 PROCEDURE — 97110 THERAPEUTIC EXERCISES: CPT | Performed by: PHYSICAL THERAPIST

## 2019-10-25 PROCEDURE — 97014 ELECTRIC STIMULATION THERAPY: CPT | Performed by: PHYSICAL THERAPIST

## 2019-10-25 RX ORDER — GABAPENTIN 300 MG/1
300 CAPSULE ORAL 2 TIMES DAILY
Qty: 180 CAPSULE | Refills: 0 | Status: SHIPPED | OUTPATIENT
Start: 2019-10-25 | End: 2020-01-23

## 2019-10-25 NOTE — PROGRESS NOTES
Physical Therapy Daily Progress Note    VISIT#: 6    Subjective   Herb Mariscal reports: that he gets relief after each session but only lasts that day. Pain hasn't improved overall. Pt would like to hold therapy after today's session and he is going to call the MD.       Objective     See Exercise, Manual, and Modality Logs for complete treatment.       Assessment & Plan     Assessment  Assessment details: Pt demonstrates improved core stabilization with activities. Pt instructed to continue with exercises at home. Pt tolerated modalities well.           Progress per Plan of Care            Timed:         Manual Therapy:         mins  84578;     Therapeutic Exercise:    15     mins  81645;     Neuromuscular Ajay:        mins  19110;    Therapeutic Activity:          mins  49410;     Gait Training:           mins  52744;     Ultrasound:          mins  82825;    Ionto                                   mins   70429  Self Care                            mins   90114    Un-Timed:  Electrical Stimulation:    15     mins  99107 ( );  Traction     15     mins 19082    Timed Treatment:   15   mins   Total Treatment:     45   mins    Fartun King, PT, DPT, OCS  [place] License # [id number]  Physical Therapist

## 2019-10-29 ENCOUNTER — HOSPITAL ENCOUNTER (OUTPATIENT)
Dept: HOSPITAL 83 - RESCLI | Age: 64
Discharge: HOME | End: 2019-10-29
Attending: INTERNAL MEDICINE
Payer: COMMERCIAL

## 2019-10-29 DIAGNOSIS — Z23: Primary | ICD-10-CM

## 2019-11-19 ENCOUNTER — OFFICE VISIT (OUTPATIENT)
Dept: ORTHOPEDIC SURGERY | Facility: CLINIC | Age: 64
End: 2019-11-19

## 2019-11-19 VITALS
DIASTOLIC BLOOD PRESSURE: 75 MMHG | WEIGHT: 212 LBS | SYSTOLIC BLOOD PRESSURE: 119 MMHG | HEIGHT: 65 IN | HEART RATE: 64 BPM | BODY MASS INDEX: 35.32 KG/M2

## 2019-11-19 DIAGNOSIS — M48.062 LUMBAR STENOSIS WITH NEUROGENIC CLAUDICATION: Primary | ICD-10-CM

## 2019-11-19 PROCEDURE — 99212 OFFICE O/P EST SF 10 MIN: CPT | Performed by: PHYSICIAN ASSISTANT

## 2019-11-19 NOTE — PROGRESS NOTES
[facility]    Patient Name: Herb Mariscal    History of Present Illness:  Herb Mariscal is a 64 y.o. year old male here today with chief complaint of had concerns including Follow-up and Pain of the Lumbar Spine..2 months of increasing low back pain occ shooting pain in legs and buttock when walking or lifting. He was sent for a course of physical therapy and started on diclofenac and gabapentin.  Pain gets better when he sits. No hx of back surgery. Nor change in bowel or bladder functions.no hx of cardiovasucalr or peripheral vasuclar disease.  Improved dramatically since he was here last.  Leg pain is gone now has axial back pain on and off.    Imaging: My interpretation of CT scan of lumbar spine is L4-S1 stenosis S1-S2 rudamentary disc.    Impression:   1. Lumbar stenosis with neurogenic claudication           Plan: Inversion table, continue gabapentin as needed.  Continue isometric exercises.  Follow-up here if pain increases.      Vitals:  Vitals:    19 0938   BP: 119/75   Pulse: 64       Patient's Family and Social History:  Family History   Problem Relation Age of Onset   • Cancer Father    • Diabetes Brother      Social History     Socioeconomic History   • Marital status:      Spouse name: Not on file   • Number of children: Not on file   • Years of education: Not on file   • Highest education level: Not on file   Tobacco Use   • Smoking status: Former Smoker     Last attempt to quit: 2010     Years since quittin.8   • Smokeless tobacco: Never Used   Substance and Sexual Activity   • Alcohol use: No     Frequency: Never   • Drug use: No   • Sexual activity: Defer       Patients Medical and Surgical History:  Past Medical History:   Diagnosis Date   • Angina pectoris (CMS/HCC)    • Diabetes mellitus (CMS/HCC)     type 2     Past Surgical History:   Procedure Laterality Date   • HAND SURGERY         Review of Systems   Constitutional: Positive for  activity change. Negative for fatigue.   HENT: Negative for congestion.    Eyes: Negative for visual disturbance.   Respiratory: Negative for shortness of breath.    Cardiovascular: Negative for chest pain.   Gastrointestinal: Negative for abdominal pain.   Genitourinary: Negative for dysuria and flank pain.   Musculoskeletal: Positive for arthralgias and back pain.   Skin: Negative for wound.   Neurological: Negative for weakness, numbness and headaches.   Psychiatric/Behavioral: Negative for behavioral problems. The patient is not nervous/anxious.        Physical Exam   Constitutional: He is oriented to person, place, and time. He appears well-developed.   HENT:   Head: Normocephalic and atraumatic.   Eyes: Conjunctivae are normal.   Neck: Normal range of motion.   Cardiovascular: Normal rate.   Pulmonary/Chest: Effort normal.   Abdominal: There is no guarding.   Musculoskeletal: He exhibits tenderness.        Right hip: Normal.        Left hip: Normal.        Lumbar back: He exhibits decreased range of motion, tenderness and pain.   Neurological: He is oriented to person, place, and time.   Skin: Skin is warm.   Psychiatric: His behavior is normal.   Vitals reviewed.    Ortho Exam    No orders of the defined types were placed in this encounter.

## 2019-12-27 ENCOUNTER — DOCUMENTATION (OUTPATIENT)
Dept: PHYSICAL THERAPY | Facility: CLINIC | Age: 64
End: 2019-12-27

## 2019-12-27 NOTE — PROGRESS NOTES
Discharge Summary  Discharge Summary from Physical Therapy Report      Number of Visits: 6     Goals: Not Met    Discharge Plan: Patient to return to referring/providing physician    Comments: Pt displays min overall progress. Pt to return to MD for follow-up. Pt will be discharged from physical therapy at this time.     Date of Discharge: 12-      PT SIGNATURE: Fartun King PT, DPT, OCS           [place] License # [id number]

## 2020-01-14 ENCOUNTER — HOSPITAL ENCOUNTER (OUTPATIENT)
Dept: HOSPITAL 83 - RESCLI | Age: 65
Discharge: HOME | End: 2020-01-14
Attending: INTERNAL MEDICINE
Payer: COMMERCIAL

## 2020-01-14 DIAGNOSIS — Z23: Primary | ICD-10-CM

## 2020-01-14 DIAGNOSIS — I10: ICD-10-CM

## 2020-01-14 DIAGNOSIS — E03.9: ICD-10-CM

## 2020-01-14 DIAGNOSIS — E11.9: ICD-10-CM

## 2020-01-14 DIAGNOSIS — Z88.8: ICD-10-CM

## 2020-01-14 DIAGNOSIS — Z86.73: ICD-10-CM

## 2020-01-14 DIAGNOSIS — E55.9: ICD-10-CM

## 2020-01-14 DIAGNOSIS — Z79.899: ICD-10-CM

## 2020-01-14 DIAGNOSIS — F32.0: ICD-10-CM

## 2020-01-20 RX ORDER — GABAPENTIN 300 MG/1
CAPSULE ORAL
Qty: 180 CAPSULE | Refills: 0 | OUTPATIENT
Start: 2020-01-20

## 2020-03-11 RX ORDER — POTASSIUM CHLORIDE 750 MG/1
10 TABLET, EXTENDED RELEASE ORAL DAILY
COMMUNITY
End: 2020-03-17

## 2020-03-11 RX ORDER — ICOSAPENT ETHYL 1000 MG/1
2 CAPSULE ORAL
COMMUNITY
Start: 2014-09-23 | End: 2021-03-22 | Stop reason: SDUPTHER

## 2020-03-11 RX ORDER — LANCETS
EACH MISCELLANEOUS
COMMUNITY
Start: 2014-05-08 | End: 2021-03-22 | Stop reason: HOSPADM

## 2020-03-11 RX ORDER — METFORMIN HYDROCHLORIDE EXTENDED-RELEASE TABLETS 1000 MG/1
1000 TABLET, FILM COATED, EXTENDED RELEASE ORAL DAILY
COMMUNITY
Start: 2014-07-17 | End: 2020-03-17

## 2020-03-11 RX ORDER — ASPIRIN 325 MG
325 TABLET ORAL DAILY
COMMUNITY
End: 2020-03-17

## 2020-03-11 RX ORDER — BUMETANIDE 1 MG/1
1 TABLET ORAL DAILY
COMMUNITY
End: 2020-03-17

## 2020-03-11 RX ORDER — SUB-Q INSULIN DEVICE, 40 UNIT
EACH MISCELLANEOUS
COMMUNITY
Start: 2014-06-26 | End: 2021-03-22 | Stop reason: HOSPADM

## 2020-03-12 ENCOUNTER — HOSPITAL ENCOUNTER (OUTPATIENT)
Dept: HOSPITAL 83 - LAB | Age: 65
Discharge: HOME | End: 2020-03-12
Attending: INTERNAL MEDICINE
Payer: COMMERCIAL

## 2020-03-12 DIAGNOSIS — I10: Primary | ICD-10-CM

## 2020-03-12 DIAGNOSIS — E03.9: ICD-10-CM

## 2020-03-12 DIAGNOSIS — E11.9: ICD-10-CM

## 2020-03-12 DIAGNOSIS — E55.9: ICD-10-CM

## 2020-03-12 LAB
ALBUMIN SERPL-MCNC: 3.6 GM/DL (ref 3.1–4.5)
ALP SERPL-CCNC: 118 U/L (ref 45–117)
ALT SERPL W P-5'-P-CCNC: 19 U/L (ref 12–78)
AST SERPL-CCNC: 8 IU/L (ref 3–35)
BASOPHILS # BLD AUTO: 0.1 10*3/UL (ref 0–0.1)
BASOPHILS NFR BLD AUTO: 0.9 % (ref 0–1)
BUN SERPL-MCNC: 22 MG/DL (ref 7–24)
CHLORIDE SERPL-SCNC: 109 MMOL/L (ref 98–107)
CHOLEST SERPL-MCNC: 175 MG/DL (ref ?–200)
CREAT SERPL-MCNC: 1.34 MG/DL (ref 0.7–1.3)
EOSINOPHIL # BLD AUTO: 0.2 10*3/UL (ref 0–0.4)
EOSINOPHIL # BLD AUTO: 4.1 % (ref 1–4)
ERYTHROCYTE [DISTWIDTH] IN BLOOD BY AUTOMATED COUNT: 12.6 % (ref 0–14.5)
HCT VFR BLD AUTO: 39.9 % (ref 42–52)
HDLC SERPL-MCNC: 45 MG/DL (ref 40–60)
HGB BLD-MCNC: 13.1 G/DL (ref 14–18)
LDLC SERPL DIRECT ASSAY-MCNC: 102 MG/DL (ref 9–159)
LYMPHOCYTES # BLD AUTO: 0.8 10*3/UL (ref 1.3–4.4)
LYMPHOCYTES NFR BLD AUTO: 12.8 % (ref 27–41)
MCH RBC QN AUTO: 28.5 PG (ref 27–31)
MCHC RBC AUTO-ENTMCNC: 32.8 G/DL (ref 33–37)
MCV RBC AUTO: 86.9 FL (ref 80–94)
MONOCYTES # BLD AUTO: 0.5 10*3/UL (ref 0.1–1)
MONOCYTES NFR BLD MANUAL: 8.3 % (ref 3–9)
NEUT #: 4.3 10*3/UL (ref 2.3–7.9)
NEUT %: 73.4 % (ref 47–73)
NRBC BLD QL AUTO: 0 % (ref 0–0)
PLATELET # BLD AUTO: 243 10*3/UL (ref 130–400)
PMV BLD AUTO: 10.3 FL (ref 9.6–12.3)
POTASSIUM SERPL-SCNC: 4.9 MMOL/L (ref 3.5–5.1)
PROT SERPL-MCNC: 7.2 GM/DL (ref 6.4–8.2)
RBC # BLD AUTO: 4.59 10*6/UL (ref 4.5–5.9)
SODIUM SERPL-SCNC: 139 MMOL/L (ref 136–145)
TRIGL SERPL-MCNC: 141 MG/DL (ref ?–150)
VLDLC SERPL CALC-MCNC: 28 MG/DL (ref 6–40)
WBC NRBC COR # BLD AUTO: 5.9 10*3/UL (ref 4.8–10.8)

## 2020-03-13 LAB
CREAT UR-MCNC: 244.1 MG/DL
MICRO ALBUMIN/CRE RATIO: 10 (ref 0–29)

## 2020-03-17 ENCOUNTER — LAB (OUTPATIENT)
Dept: LAB | Facility: HOSPITAL | Age: 65
End: 2020-03-17

## 2020-03-17 ENCOUNTER — OFFICE VISIT (OUTPATIENT)
Dept: ENDOCRINOLOGY | Facility: CLINIC | Age: 65
End: 2020-03-17

## 2020-03-17 VITALS
TEMPERATURE: 98.1 F | SYSTOLIC BLOOD PRESSURE: 122 MMHG | DIASTOLIC BLOOD PRESSURE: 70 MMHG | HEART RATE: 72 BPM | OXYGEN SATURATION: 98 % | WEIGHT: 211 LBS | HEIGHT: 65 IN | BODY MASS INDEX: 35.16 KG/M2

## 2020-03-17 DIAGNOSIS — E66.09 CLASS 2 OBESITY DUE TO EXCESS CALORIES WITHOUT SERIOUS COMORBIDITY WITH BODY MASS INDEX (BMI) OF 35.0 TO 35.9 IN ADULT: ICD-10-CM

## 2020-03-17 DIAGNOSIS — Z79.4 TYPE 2 DIABETES MELLITUS WITH HYPERGLYCEMIA, WITH LONG-TERM CURRENT USE OF INSULIN (HCC): ICD-10-CM

## 2020-03-17 DIAGNOSIS — E11.65 TYPE 2 DIABETES MELLITUS WITH HYPERGLYCEMIA, WITH LONG-TERM CURRENT USE OF INSULIN (HCC): ICD-10-CM

## 2020-03-17 DIAGNOSIS — I10 ESSENTIAL HYPERTENSION: ICD-10-CM

## 2020-03-17 DIAGNOSIS — Z79.4 TYPE 2 DIABETES MELLITUS WITH HYPERGLYCEMIA, WITH LONG-TERM CURRENT USE OF INSULIN (HCC): Primary | ICD-10-CM

## 2020-03-17 DIAGNOSIS — E11.65 TYPE 2 DIABETES MELLITUS WITH HYPERGLYCEMIA, WITH LONG-TERM CURRENT USE OF INSULIN (HCC): Primary | ICD-10-CM

## 2020-03-17 DIAGNOSIS — E78.2 MIXED HYPERLIPIDEMIA: ICD-10-CM

## 2020-03-17 DIAGNOSIS — E11.42 DIABETIC PERIPHERAL NEUROPATHY (HCC): ICD-10-CM

## 2020-03-17 PROBLEM — E66.812 CLASS 2 OBESITY DUE TO EXCESS CALORIES WITHOUT SERIOUS COMORBIDITY WITH BODY MASS INDEX (BMI) OF 35.0 TO 35.9 IN ADULT: Status: ACTIVE | Noted: 2020-03-17

## 2020-03-17 LAB
ALBUMIN SERPL-MCNC: 5 G/DL (ref 3.5–5.2)
ALBUMIN UR-MCNC: <1.2 MG/DL
ALBUMIN/GLOB SERPL: 2.1 G/DL
ALP SERPL-CCNC: 70 U/L (ref 39–117)
ALT SERPL W P-5'-P-CCNC: 76 U/L (ref 1–41)
ANION GAP SERPL CALCULATED.3IONS-SCNC: 13.8 MMOL/L (ref 5–15)
AST SERPL-CCNC: 51 U/L (ref 1–40)
BILIRUB SERPL-MCNC: 0.2 MG/DL (ref 0.2–1.2)
BUN BLD-MCNC: 24 MG/DL (ref 8–23)
BUN/CREAT SERPL: 22.4 (ref 7–25)
CALCIUM SPEC-SCNC: 9.6 MG/DL (ref 8.6–10.5)
CHLORIDE SERPL-SCNC: 101 MMOL/L (ref 98–107)
CHOLEST SERPL-MCNC: 134 MG/DL (ref 0–200)
CO2 SERPL-SCNC: 21.2 MMOL/L (ref 22–29)
CREAT BLD-MCNC: 1.07 MG/DL (ref 0.76–1.27)
CREAT UR-MCNC: 61.3 MG/DL
GFR SERPL CREATININE-BSD FRML MDRD: 70 ML/MIN/1.73
GLOBULIN UR ELPH-MCNC: 2.4 GM/DL
GLUCOSE BLD-MCNC: 222 MG/DL (ref 65–99)
HBA1C MFR BLD: 10.9 % (ref 3.5–5.6)
HDLC SERPL-MCNC: 22 MG/DL (ref 40–60)
LDLC SERPL CALC-MCNC: 54 MG/DL (ref 0–100)
LDLC/HDLC SERPL: 2.45 {RATIO}
MICROALBUMIN/CREAT UR: NORMAL MG/G{CREAT}
POTASSIUM BLD-SCNC: 4.2 MMOL/L (ref 3.5–5.2)
PROT SERPL-MCNC: 7.4 G/DL (ref 6–8.5)
SODIUM BLD-SCNC: 136 MMOL/L (ref 136–145)
TRIGL SERPL-MCNC: 291 MG/DL (ref 0–150)
TSH SERPL DL<=0.05 MIU/L-ACNC: 3.93 UIU/ML (ref 0.27–4.2)
VLDLC SERPL-MCNC: 58.2 MG/DL (ref 5–40)

## 2020-03-17 PROCEDURE — 80053 COMPREHEN METABOLIC PANEL: CPT

## 2020-03-17 PROCEDURE — 84443 ASSAY THYROID STIM HORMONE: CPT

## 2020-03-17 PROCEDURE — 82043 UR ALBUMIN QUANTITATIVE: CPT

## 2020-03-17 PROCEDURE — 36415 COLL VENOUS BLD VENIPUNCTURE: CPT

## 2020-03-17 PROCEDURE — 83036 HEMOGLOBIN GLYCOSYLATED A1C: CPT

## 2020-03-17 PROCEDURE — 80061 LIPID PANEL: CPT

## 2020-03-17 PROCEDURE — 99244 OFF/OP CNSLTJ NEW/EST MOD 40: CPT | Performed by: INTERNAL MEDICINE

## 2020-03-17 PROCEDURE — 82570 ASSAY OF URINE CREATININE: CPT

## 2020-03-17 RX ORDER — OMEPRAZOLE 20 MG/1
20 CAPSULE, DELAYED RELEASE ORAL DAILY
COMMUNITY
Start: 2019-12-11 | End: 2021-09-01 | Stop reason: SDUPTHER

## 2020-03-17 RX ORDER — METFORMIN HYDROCHLORIDE 500 MG/1
TABLET, EXTENDED RELEASE ORAL
Qty: 60 TABLET | Refills: 6 | Status: SHIPPED | OUTPATIENT
Start: 2020-03-17 | End: 2020-06-22 | Stop reason: SDUPTHER

## 2020-03-17 RX ORDER — MAGNESIUM 200 MG
1000 TABLET ORAL DAILY
Qty: 100 EACH | Refills: 4 | Status: SHIPPED | OUTPATIENT
Start: 2020-03-17

## 2020-03-17 NOTE — PROGRESS NOTES
Endocrine Consult Outpatient  Referred by Dr. Lakhwinder Kaiser for uncontrolled type 2 diabetes consultation  Patient Care Team:  Jo Ann Mazariegos APRN as PCP - General     Chief Complaint: Uncontrolled type 2 diabetes    HPI: 64-year-old male with history of type 2 diabetes for last 2002, also history of hypertension and hyperlipidemia is referred for type 2 diabetes consultation.    For type 2 diabetes: He tells me that he has gone through diabetes education in [place] in 2002, he checks his blood sugars 2-3 times per day mostly runs more than 200.  He is currently on Victoza 1.8 mg subcu daily, Farxiga 10 mg once a day, Tresiba 140 units once a day and Starlix 120 mg once a day.  He tells me that he has used metformin it was stopped by his family physician and is not sure why it was stopped.  He has used NovoLog in the past with meals which helped him but he is ran out and has not been able to use it for last 6 months.  He is trying to work on his diet the best he can.  Hypertension: Well-controlled  Hyperlipidemia: On atorvastatin    Old records reviewed: Labs from March 14, 2019 showed an A1c of 6.6, , triglycerides 149, urine microalbumin creatinine ratio was less than 2, sodium 137, potassium 3.9, chloride 104, CO2 23, glucose 134, BUN 24 and creatinine 1.1.    Past Medical History:   Diagnosis Date   • Angina pectoris (CMS/[facility])    • Diabetes mellitus (CMS/[facility])     type 2   • Hyperlipidemia    • Hypertension    • Type 2 diabetes mellitus (CMS/[facility])        Social History     Socioeconomic History   • Marital status:      Spouse name: Not on file   • Number of children: Not on file   • Years of education: Not on file   • Highest education level: Not on file   Tobacco Use   • Smoking status: Former Smoker     Last attempt to quit: 2010     Years since quitting: 10.2   • Smokeless tobacco: Never Used   Substance and Sexual Activity   • Alcohol use: No     Frequency: Never   • Drug use: No   • Sexual  activity: Defer       Family History   Problem Relation Age of Onset   • Cancer Father    • Diabetes Brother    • Hyperlipidemia Mother    • Diabetes Maternal Uncle        No Known Allergies    ROS:   Constitutional:  Denies fatigue, tiredness.    Eyes:  Denies change in visual acuity   HENT:  Denies nasal congestion or sore throat   Respiratory: denies cough, shortness of breath.   Cardiovascular:  denies chest pain, edema   GI:  Denies abdominal pain, nausea, vomiting.    :  Denies dysuria   Musculoskeletal:  Denies back pain or joint pain   Integument:  Denies dry skin, rash   Neurologic:  Denies headache, focal weakness or sensory changes   Endocrine:  Denies polyuria or polydipsia   Psychiatric:  Denies depression or anxiety      Vitals:    03/17/20 1051   BP: 122/70   Pulse: 72   Temp: 98.1 °F (36.7 °C)   SpO2: 98%        Physical Exam:  GEN: NAD, conversant  EYES: EOMI, PERRL, no conjunctival erythema  NECK: no thyromegaly, full ROM   CV: RRR, no murmurs/rubs/gallops, no peripheral edema  LUNG: CTAB, no wheezes/rales/ronchi  SKIN: no rashes, no acanthosis  MSK: no deformities, full ROM of all extremities  NEURO: no tremors, DTR normal  PSYCH: AOX3, appropriate mood, affect normal      Results Review:     I reviewed the patient's new clinical results.    Lab Results   Component Value Date    GLUCOSE 134 (H) 03/14/2019    BUN 24 (H) 03/14/2019    CREATININE 1.1 03/14/2019    BCR 21.8 (H) 03/14/2019    K 3.9 03/14/2019    CO2 23 03/14/2019    CALCIUM 9.3 03/14/2019    ALBUMIN 4.2 03/14/2019    LABIL2 1.4 03/14/2019    AST 30 03/14/2019    ALT 41 03/14/2019    CHOL 153 03/14/2019    TRIG 149 03/14/2019    HDL 28 (L) 03/14/2019     (H) 03/14/2019     Lab Results   Component Value Date    HGBA1C 8.3 (H) 10/04/2018     Lab Results   Component Value Date    CREATININE 1.1 03/14/2019       Medication Review: Reviewed.       Current Outpatient Medications:   •  ACCU-CHEK FASTCLIX LANCETS misc, Pt is to check  blood sugars up to QID, Disp: , Rfl:   •  cephalexin (KEFLEX) 500 MG capsule, Take 500 mg by mouth 2 (Two) Times a Day., Disp: , Rfl:   •  Dapagliflozin Propanediol (FARXIGA PO), Take 10 mg by mouth Daily., Disp: , Rfl:   •  enalapril (VASOTEC) 20 MG tablet, Take 20 mg by mouth Daily., Disp: , Rfl:   •  glucose blood test strip, Pt is to test blood sugars up to QID, Disp: , Rfl:   •  icosapent ethyl (VASCEPA) 1 g capsule capsule, Take 2 g by mouth., Disp: , Rfl:   •  Insulin Degludec (TRESIBA) 100 UNIT/ML solution injection, Inject 80 Units under the skin into the appropriate area as directed Daily., Disp: , Rfl:   •  Insulin Disposable Pump (V-GO 40) kit, , Disp: , Rfl:   •  Liraglutide (VICTOZA) 18 MG/3ML solution pen-injector injection, Inject 0.6 mg under the skin into the appropriate area as directed Daily., Disp: , Rfl:   •  nateglinide (STARLIX) 120 MG tablet, Take 120 mg by mouth 3 (Three) Times a Day Before Meals., Disp: , Rfl:   •  omeprazole (priLOSEC) 20 MG capsule, Take 20 mg by mouth Daily. before a meal, Disp: , Rfl:   •  simvastatin (ZOCOR) 40 MG tablet, Take 40 mg by mouth Every Night., Disp: , Rfl:   •  topiramate (TOPAMAX) 50 MG tablet, Take 150 mg by mouth 2 (Two) Times a Day., Disp: , Rfl:     Assessment/Plan   1.  Diabetes mellitus type 2: Uncontrolled, at this time I will DC Starlix and put him on metformin extended release 500 mg, 1 tablet twice a day and continue Victoza with Farxiga and Tresiba.  He has been through diabetes education and feels like he is well versed and does not want to proceed with refresher course at this time.  He is advised to always keep glucose source with him in case of low blood sugar and get regular eye exam and flu vaccine.  I will get some labs today.  Including A1c.  2.  Hypertension: Well-controlled, continue current medication  3.  Hyperlipidemia: He is currently on simvastatin, I will follow lipid panel and then make further commendations  4.  Diabetic  peripheral neuropathy, is having some numbness in the toes on the right side, I will add some vitamin D and B12 supplementation.                  Sheila Morales MD FACE.                  Answers for HPI/ROS submitted by the patient on 3/15/2020   Diabetes problem  What is the primary reason for your visit?: Diabetes  Diabetes type: type 2  MedicAlert ID: No  Disease duration: 18 years  blurred vision: No  chest pain: No  fatigue: No  foot paresthesias: Yes  foot ulcerations: No  polydipsia: Yes  polyphagia: No  polyuria: Yes  visual change: No  weakness: No  weight loss: No  Symptom course: improving  confusion: No  dizziness: No  headaches: No  hunger: No  mood changes: No  nervous/anxious: No  pallor: No  seizures: No  sleepiness: No  speech difficulty: No  sweats: No  tremors: No  blackouts: No  hospitalization: No  nocturnal hypoglycemia: No  required assistance: No  required glucagon: No  CVA: No  heart disease: No  impotence: No  nephropathy: No  peripheral neuropathy: Yes  PVD: No  retinopathy: No  CAD risks: dyslipidemia, hypertension, obesity  Current treatments: diet, insulin injections, oral agent (triple therapy)  Treatment compliance: all of the time  Dose schedule: pre-breakfast, pre-dinner  Given by: patient  Injection sites: abdominal wall  Home blood tests: 3-4 x per day  Home urines: <1 x per month  Monitoring compliance: excellent  Blood glucose trend: fluctuating minimally  breakfast time: 6-7 am  breakfast glucose level: 110-130  lunch time: 12-1 pm  lunch glucose level: 130-140  dinner time: 7-8 pm  dinner glucose level: 140-180  High score: >200  Overall: 140-180  Weight trend: fluctuating minimally  Current diet: diabetic, generally healthy, low salt  Meal planning: avoidance of concentrated sweets  Exercise: intermittently  Dietitian visit: No  Eye exam current: Yes  Sees podiatrist: No

## 2020-03-17 NOTE — PATIENT INSTRUCTIONS
Start metformin extended release 500 mg, 1 tablet twice a day  Start vitamin B12 1000 mcg sublingual daily  DC Starlix  Continue to work on your diet and activity  Get labs done today  Get regular eye exam and flu vaccine  Follow-up in 3 to 4 months.

## 2020-03-20 DIAGNOSIS — Z79.4 TYPE 2 DIABETES MELLITUS WITH HYPERGLYCEMIA, WITH LONG-TERM CURRENT USE OF INSULIN (HCC): Primary | ICD-10-CM

## 2020-03-20 DIAGNOSIS — E78.2 MIXED HYPERLIPIDEMIA: ICD-10-CM

## 2020-03-20 DIAGNOSIS — I10 ESSENTIAL HYPERTENSION: ICD-10-CM

## 2020-03-20 DIAGNOSIS — E11.65 TYPE 2 DIABETES MELLITUS WITH HYPERGLYCEMIA, WITH LONG-TERM CURRENT USE OF INSULIN (HCC): Primary | ICD-10-CM

## 2020-06-15 ENCOUNTER — LAB (OUTPATIENT)
Dept: LAB | Facility: HOSPITAL | Age: 65
End: 2020-06-15

## 2020-06-15 DIAGNOSIS — I10 ESSENTIAL HYPERTENSION: ICD-10-CM

## 2020-06-15 DIAGNOSIS — E78.2 MIXED HYPERLIPIDEMIA: ICD-10-CM

## 2020-06-15 DIAGNOSIS — Z79.4 TYPE 2 DIABETES MELLITUS WITH HYPERGLYCEMIA, WITH LONG-TERM CURRENT USE OF INSULIN (HCC): ICD-10-CM

## 2020-06-15 DIAGNOSIS — E11.65 TYPE 2 DIABETES MELLITUS WITH HYPERGLYCEMIA, WITH LONG-TERM CURRENT USE OF INSULIN (HCC): ICD-10-CM

## 2020-06-15 LAB
ALBUMIN SERPL-MCNC: 4.6 G/DL (ref 3.5–5.2)
ALBUMIN/GLOB SERPL: 2 G/DL
ALP SERPL-CCNC: 51 U/L (ref 39–117)
ALT SERPL W P-5'-P-CCNC: 79 U/L (ref 1–41)
ANION GAP SERPL CALCULATED.3IONS-SCNC: 8 MMOL/L (ref 5–15)
AST SERPL-CCNC: 47 U/L (ref 1–40)
BILIRUB SERPL-MCNC: 0.2 MG/DL (ref 0.2–1.2)
BUN BLD-MCNC: 16 MG/DL (ref 8–23)
BUN/CREAT SERPL: 17.6 (ref 7–25)
CALCIUM SPEC-SCNC: 9.4 MG/DL (ref 8.6–10.5)
CHLORIDE SERPL-SCNC: 109 MMOL/L (ref 98–107)
CO2 SERPL-SCNC: 26 MMOL/L (ref 22–29)
CREAT BLD-MCNC: 0.91 MG/DL (ref 0.76–1.27)
GFR SERPL CREATININE-BSD FRML MDRD: 84 ML/MIN/1.73
GLOBULIN UR ELPH-MCNC: 2.3 GM/DL
GLUCOSE BLD-MCNC: 171 MG/DL (ref 65–99)
HBA1C MFR BLD: 9.3 % (ref 3.5–5.6)
POTASSIUM BLD-SCNC: 4 MMOL/L (ref 3.5–5.2)
PROT SERPL-MCNC: 6.9 G/DL (ref 6–8.5)
SODIUM BLD-SCNC: 143 MMOL/L (ref 136–145)

## 2020-06-15 PROCEDURE — 36415 COLL VENOUS BLD VENIPUNCTURE: CPT

## 2020-06-15 PROCEDURE — 80053 COMPREHEN METABOLIC PANEL: CPT

## 2020-06-15 PROCEDURE — 83036 HEMOGLOBIN GLYCOSYLATED A1C: CPT

## 2020-06-22 ENCOUNTER — OFFICE VISIT (OUTPATIENT)
Dept: ENDOCRINOLOGY | Facility: CLINIC | Age: 65
End: 2020-06-22

## 2020-06-22 VITALS
HEART RATE: 81 BPM | DIASTOLIC BLOOD PRESSURE: 70 MMHG | OXYGEN SATURATION: 98 % | BODY MASS INDEX: 35.65 KG/M2 | WEIGHT: 214 LBS | TEMPERATURE: 97.1 F | SYSTOLIC BLOOD PRESSURE: 120 MMHG | HEIGHT: 65 IN

## 2020-06-22 DIAGNOSIS — E11.65 TYPE 2 DIABETES MELLITUS WITH HYPERGLYCEMIA, WITH LONG-TERM CURRENT USE OF INSULIN (HCC): Primary | ICD-10-CM

## 2020-06-22 DIAGNOSIS — I10 ESSENTIAL HYPERTENSION: ICD-10-CM

## 2020-06-22 DIAGNOSIS — Z79.4 TYPE 2 DIABETES MELLITUS WITH HYPERGLYCEMIA, WITH LONG-TERM CURRENT USE OF INSULIN (HCC): Primary | ICD-10-CM

## 2020-06-22 DIAGNOSIS — R73.9 HYPERGLYCEMIA: ICD-10-CM

## 2020-06-22 DIAGNOSIS — E78.2 MIXED HYPERLIPIDEMIA: ICD-10-CM

## 2020-06-22 DIAGNOSIS — E11.42 DIABETIC PERIPHERAL NEUROPATHY (HCC): ICD-10-CM

## 2020-06-22 LAB — GLUCOSE BLDC GLUCOMTR-MCNC: 129 MG/DL (ref 70–130)

## 2020-06-22 PROCEDURE — 99214 OFFICE O/P EST MOD 30 MIN: CPT | Performed by: INTERNAL MEDICINE

## 2020-06-22 PROCEDURE — 82962 GLUCOSE BLOOD TEST: CPT | Performed by: INTERNAL MEDICINE

## 2020-06-22 RX ORDER — METFORMIN HYDROCHLORIDE 500 MG/1
TABLET, EXTENDED RELEASE ORAL
Qty: 360 TABLET | Refills: 6 | Status: SHIPPED | OUTPATIENT
Start: 2020-06-22 | End: 2021-03-02

## 2020-06-22 RX ORDER — METFORMIN HYDROCHLORIDE 500 MG/1
TABLET, EXTENDED RELEASE ORAL
Qty: 360 TABLET | Refills: 6 | Status: SHIPPED | OUTPATIENT
Start: 2020-06-22 | End: 2020-06-22 | Stop reason: SDUPTHER

## 2020-06-22 NOTE — PROGRESS NOTES
Endocrine Progress Note Outpatient     Patient Care Team:  Jo Ann Mazariegos APRN as PCP - General    Chief Complaint: Follow-up type 2 diabetes    HPI: 64-year-old male with history of type 2 diabetes, hypertension, hyperlipidemia is here for follow-up.  For type 2 diabetes: He is currently on metformin 500 mg twice a day, he is on Victoza 1.8 mg subcu daily, he is on Farxiga 10 once a day and Tresiba: 20 units subcu daily.  He tells me his blood sugar runs about 1 7200 in the morning.  He denies any low blood sugars.  He has been working on his diet.  He did have diabetes education back in [place] in 2002.  Hypertension: Well-controlled  Hyperlipidemia: On atorvastatin.      Past Medical History:   Diagnosis Date   • Angina pectoris (CMS/[facility])    • Diabetes mellitus (CMS/[facility])     type 2   • Hyperlipidemia    • Hypertension    • Type 2 diabetes mellitus (CMS/HCC)        Social History     Socioeconomic History   • Marital status:      Spouse name: Not on file   • Number of children: Not on file   • Years of education: Not on file   • Highest education level: Not on file   Tobacco Use   • Smoking status: Former Smoker     Last attempt to quit: 2010     Years since quitting: 10.4   • Smokeless tobacco: Never Used   Substance and Sexual Activity   • Alcohol use: No     Frequency: Never   • Drug use: No   • Sexual activity: Defer       Family History   Problem Relation Age of Onset   • Cancer Father    • Diabetes Brother    • Hyperlipidemia Mother    • Diabetes Maternal Uncle        No Known Allergies    ROS:   Constitutional:  Denies fatigue, tiredness.    Eyes:  Denies change in visual acuity   HENT:  Denies nasal congestion or sore throat   Respiratory: denies cough, shortness of breath.   Cardiovascular:  denies chest pain, edema   GI:  Denies abdominal pain, nausea, vomiting.   Musculoskeletal:  Denies back pain or joint pain   Integument:  Denies dry skin and rash   Neurologic:  Denies headache, focal weakness  or sensory changes   Endocrine:  Denies polyuria or polydipsia   Psychiatric:  Denies depression or anxiety      Vitals:    06/22/20 0811   BP: 120/70   Pulse: 81   Temp: 97.1 °F (36.2 °C)   SpO2: 98%       Physical Exam:  GEN: NAD, conversant  EYES: EOMI, PERRL, no conjunctival erythema  NECK: no thyromegaly, full ROM   CV: RRR, no murmurs/rubs/gallops, no peripheral edema  LUNG: CTAB, no wheezes/rales/ronchi  SKIN: no rashes, no acanthosis  MSK: no deformities, full ROM of all extremities  NEURO: no tremors, DTR normal  PSYCH: AOX3, appropriate mood, affect normal      Results Review:     I reviewed the patient's new clinical results.    Lab Results   Component Value Date    HGBA1C 9.3 (H) 06/15/2020    HGBA1C 10.9 (H) 03/17/2020    HGBA1C 8.3 (H) 10/04/2018      Lab Results   Component Value Date    GLUCOSE 171 (H) 06/15/2020    BUN 16 06/15/2020    CREATININE 0.91 06/15/2020    EGFRIFNONA 84 06/15/2020    BCR 17.6 06/15/2020    K 4.0 06/15/2020    CO2 26.0 06/15/2020    CALCIUM 9.4 06/15/2020    ALBUMIN 4.60 06/15/2020    LABIL2 1.4 03/14/2019    AST 47 (H) 06/15/2020    ALT 79 (H) 06/15/2020    CHOL 134 03/17/2020    TRIG 291 (H) 03/17/2020    LDL 54 03/17/2020    HDL 22 (L) 03/17/2020     Lab Results   Component Value Date    TSH 3.930 03/17/2020         Medication Review: Reviewed.       Current Outpatient Medications:   •  ACCU-CHEK FASTCLIX LANCETS misc, Pt is to check blood sugars up to QID, Disp: , Rfl:   •  cephalexin (KEFLEX) 500 MG capsule, Take 500 mg by mouth 2 (Two) Times a Day., Disp: , Rfl:   •  Cyanocobalamin (VITAMIN B-12) 1000 MCG sublingual tablet, Place 1,000 mcg under the tongue Daily., Disp: 100 each, Rfl: 4  •  Dapagliflozin Propanediol (Farxiga) 10 MG tablet, Take 10 mg by mouth Daily., Disp: 30 tablet, Rfl: 6  •  enalapril (VASOTEC) 20 MG tablet, Take 20 mg by mouth Daily., Disp: , Rfl:   •  glucose blood test strip, Pt is to test blood sugars up to QID, Disp: , Rfl:   •  icosapent ethyl  (VASCEPA) 1 g capsule capsule, Take 2 g by mouth., Disp: , Rfl:   •  Insulin Degludec (Tresiba FlexTouch) 200 UNIT/ML solution pen-injector pen injection, Inject 140 units subcu daily., Disp: 15 pen, Rfl: 4  •  Insulin Disposable Pump (V-GO 40) kit, , Disp: , Rfl:   •  Liraglutide (Victoza) 18 MG/3ML solution pen-injector injection, Inject 1.8 mg under the skin into the appropriate area as directed Daily., Disp: 9 pen, Rfl: 4  •  metFORMIN ER (GLUCOPHAGE-XR) 500 MG 24 hr tablet, Take 1 tablet twice a day., Disp: 60 tablet, Rfl: 6  •  omeprazole (priLOSEC) 20 MG capsule, Take 20 mg by mouth Daily. before a meal, Disp: , Rfl:   •  simvastatin (ZOCOR) 40 MG tablet, Take 40 mg by mouth Every Night., Disp: , Rfl:   •  topiramate (TOPAMAX) 50 MG tablet, Take 150 mg by mouth 2 (Two) Times a Day., Disp: , Rfl:       Assessment/Plan   1.  Diabetes mellitus type 2: Uncontrolled with A1c at 9.3%.  At this time I have asked him to DC Victoza and add Ozempic 1.0 mg subcu weekly and I will increase metformin to 2 tablets twice a day totaling 1000 mg twice a day.  He will continue Tresiba and Farxiga for now.  He is advised to continue to work on his diet and activity and needs to bring blood sugar records for review.  Advised to always keep glucose source with him in case of low blood sugar and get regular eye exam and flu vaccine.  2.  Hypertension: Well-controlled, continue current medication  3.  Hyperlipidemia: Well-controlled, will follow lipid panel.    4.  Diabetic peripheral neuropathy: Improving.            Sheila Morales MD FACE.

## 2020-06-22 NOTE — PATIENT INSTRUCTIONS
Increase metformin to 2 tablets twice a day  DC Victoza and start Ozempic 1.0 mg subcu weekly  Continue Farxiga and Tresiba  Always keep glucose source with you in case of low blood sugar  Always get regular eye exam  Follow-up in 3 to 4 months with labs.

## 2020-09-09 ENCOUNTER — LAB (OUTPATIENT)
Dept: LAB | Facility: HOSPITAL | Age: 65
End: 2020-09-09

## 2020-09-09 DIAGNOSIS — E78.2 MIXED HYPERLIPIDEMIA: ICD-10-CM

## 2020-09-09 DIAGNOSIS — E11.65 TYPE 2 DIABETES MELLITUS WITH HYPERGLYCEMIA, WITH LONG-TERM CURRENT USE OF INSULIN (HCC): ICD-10-CM

## 2020-09-09 DIAGNOSIS — I10 ESSENTIAL HYPERTENSION: ICD-10-CM

## 2020-09-09 DIAGNOSIS — E11.42 DIABETIC PERIPHERAL NEUROPATHY (HCC): ICD-10-CM

## 2020-09-09 DIAGNOSIS — Z79.4 TYPE 2 DIABETES MELLITUS WITH HYPERGLYCEMIA, WITH LONG-TERM CURRENT USE OF INSULIN (HCC): ICD-10-CM

## 2020-09-09 LAB
ALBUMIN SERPL-MCNC: 4.6 G/DL (ref 3.5–5.2)
ALBUMIN UR-MCNC: <1.2 MG/DL
ALBUMIN/GLOB SERPL: 2.2 G/DL
ALP SERPL-CCNC: 42 U/L (ref 39–117)
ALT SERPL W P-5'-P-CCNC: 77 U/L (ref 1–41)
ANION GAP SERPL CALCULATED.3IONS-SCNC: 11.4 MMOL/L (ref 5–15)
AST SERPL-CCNC: 58 U/L (ref 1–40)
BILIRUB SERPL-MCNC: 0.4 MG/DL (ref 0–1.2)
BUN SERPL-MCNC: 21 MG/DL (ref 8–23)
BUN/CREAT SERPL: 22.8 (ref 7–25)
CALCIUM SPEC-SCNC: 9.2 MG/DL (ref 8.6–10.5)
CHLORIDE SERPL-SCNC: 105 MMOL/L (ref 98–107)
CHOLEST SERPL-MCNC: 108 MG/DL (ref 0–200)
CO2 SERPL-SCNC: 22.6 MMOL/L (ref 22–29)
CREAT SERPL-MCNC: 0.92 MG/DL (ref 0.76–1.27)
CREAT UR-MCNC: 160 MG/DL
GFR SERPL CREATININE-BSD FRML MDRD: 83 ML/MIN/1.73
GLOBULIN UR ELPH-MCNC: 2.1 GM/DL
GLUCOSE SERPL-MCNC: 61 MG/DL (ref 65–99)
HBA1C MFR BLD: 7.8 % (ref 3.5–5.6)
HDLC SERPL-MCNC: 22 MG/DL (ref 40–60)
LDLC SERPL CALC-MCNC: 61 MG/DL (ref 0–100)
LDLC/HDLC SERPL: 2.79 {RATIO}
MICROALBUMIN/CREAT UR: NORMAL MG/G{CREAT}
POTASSIUM SERPL-SCNC: 3.6 MMOL/L (ref 3.5–5.2)
PROT SERPL-MCNC: 6.7 G/DL (ref 6–8.5)
SODIUM SERPL-SCNC: 139 MMOL/L (ref 136–145)
TRIGL SERPL-MCNC: 123 MG/DL (ref 0–150)
VLDLC SERPL-MCNC: 24.6 MG/DL (ref 5–40)

## 2020-09-09 PROCEDURE — 82570 ASSAY OF URINE CREATININE: CPT

## 2020-09-09 PROCEDURE — 80061 LIPID PANEL: CPT

## 2020-09-09 PROCEDURE — 80053 COMPREHEN METABOLIC PANEL: CPT

## 2020-09-09 PROCEDURE — 83036 HEMOGLOBIN GLYCOSYLATED A1C: CPT

## 2020-09-09 PROCEDURE — 82043 UR ALBUMIN QUANTITATIVE: CPT

## 2020-09-09 PROCEDURE — 36415 COLL VENOUS BLD VENIPUNCTURE: CPT

## 2020-09-09 RX ORDER — TRAMADOL HYDROCHLORIDE 50 MG/1
50 TABLET ORAL EVERY 6 HOURS PRN
COMMUNITY
Start: 2020-08-21 | End: 2020-09-14 | Stop reason: HOSPADM

## 2020-09-09 RX ORDER — CHLORHEXIDINE GLUCONATE 0.12 MG/ML
RINSE ORAL
COMMUNITY
Start: 2020-09-08 | End: 2020-09-14 | Stop reason: HOSPADM

## 2020-09-14 ENCOUNTER — OFFICE VISIT (OUTPATIENT)
Dept: ENDOCRINOLOGY | Facility: CLINIC | Age: 65
End: 2020-09-14

## 2020-09-14 VITALS
HEART RATE: 81 BPM | SYSTOLIC BLOOD PRESSURE: 125 MMHG | WEIGHT: 217 LBS | DIASTOLIC BLOOD PRESSURE: 70 MMHG | BODY MASS INDEX: 36.15 KG/M2 | OXYGEN SATURATION: 98 % | TEMPERATURE: 97.1 F | HEIGHT: 65 IN

## 2020-09-14 DIAGNOSIS — Z79.4 TYPE 2 DIABETES MELLITUS WITH HYPERGLYCEMIA, WITH LONG-TERM CURRENT USE OF INSULIN (HCC): Primary | ICD-10-CM

## 2020-09-14 DIAGNOSIS — E66.09 CLASS 2 OBESITY DUE TO EXCESS CALORIES WITHOUT SERIOUS COMORBIDITY WITH BODY MASS INDEX (BMI) OF 35.0 TO 35.9 IN ADULT: ICD-10-CM

## 2020-09-14 DIAGNOSIS — E11.42 DIABETIC PERIPHERAL NEUROPATHY (HCC): ICD-10-CM

## 2020-09-14 DIAGNOSIS — E78.2 MIXED HYPERLIPIDEMIA: ICD-10-CM

## 2020-09-14 DIAGNOSIS — E11.65 TYPE 2 DIABETES MELLITUS WITH HYPERGLYCEMIA, WITH LONG-TERM CURRENT USE OF INSULIN (HCC): Primary | ICD-10-CM

## 2020-09-14 DIAGNOSIS — I10 ESSENTIAL HYPERTENSION: ICD-10-CM

## 2020-09-14 PROCEDURE — 99214 OFFICE O/P EST MOD 30 MIN: CPT | Performed by: INTERNAL MEDICINE

## 2020-09-14 RX ORDER — LIRAGLUTIDE 6 MG/ML
INJECTION SUBCUTANEOUS
COMMUNITY
Start: 2020-09-12 | End: 2021-03-22

## 2020-09-14 NOTE — PROGRESS NOTES
Endocrine Progress Note Outpatient     Patient Care Team:  Jo Ann Mazariegos APRN as PCP - General    Chief Complaint: Follow-up type 2 diabetes    HPI: 64-year-old male with history of type 2 diabetes, hypertension, hyperlipidemia is here for follow-up.    For type 2 diabetes: He is currently on metformin 1000 mg twice a day, he is on Victoza 1.8 mg subcu daily, he is on Farxiga 10 once a day and Tresiba: 20 units subcu daily.  We did prescribe Ozempic on last visit however he has plenty of supply of Victoza and is still using Victoza.  He is tolerating it well.  He is working on his diet.  His average blood sugars about 160s that I reviewed through his meter.  Denies low blood sugars.  He did have diabetes education back in [place] in 2002.    Hypertension: Well-controlled.    Hyperlipidemia: On simvastatin.    Diabetic peripheral neuropathy: On vitamin D and B12 supplementation.      Past Medical History:   Diagnosis Date   • Angina pectoris (CMS/[facility])    • Diabetes mellitus (CMS/[facility])     type 2   • Hyperlipidemia    • Hypertension    • Type 2 diabetes mellitus (CMS/HCC)        Social History     Socioeconomic History   • Marital status:      Spouse name: Not on file   • Number of children: Not on file   • Years of education: Not on file   • Highest education level: Not on file   Tobacco Use   • Smoking status: Former Smoker     Quit date: 2010     Years since quitting: 10.7   • Smokeless tobacco: Never Used   Substance and Sexual Activity   • Alcohol use: No     Frequency: Never   • Drug use: No   • Sexual activity: Defer       Family History   Problem Relation Age of Onset   • Cancer Father    • Diabetes Brother    • Hyperlipidemia Mother    • Diabetes Maternal Uncle        No Known Allergies    ROS:   Constitutional:  Denies fatigue, tiredness.    Eyes:  Denies change in visual acuity   HENT:  Denies nasal congestion or sore throat   Respiratory: denies cough, shortness of breath.   Cardiovascular:  denies  chest pain, edema   GI:  Denies abdominal pain, nausea, vomiting.   Musculoskeletal:  Denies back pain or joint pain   Integument:  Denies dry skin and rash   Neurologic:  Denies headache, focal weakness or sensory changes   Endocrine:  Denies polyuria or polydipsia   Psychiatric:  Denies depression or anxiety      Vitals:    09/14/20 0754   BP: 125/70   Pulse: 81   Temp: 97.1 °F (36.2 °C)   SpO2: 98%       Physical Exam:  GEN: NAD, conversant  EYES: EOMI, PERRL, no conjunctival erythema  NECK: no thyromegaly, full ROM   CV: RRR, no murmurs/rubs/gallops, no peripheral edema  LUNG: CTAB, no wheezes/rales/ronchi  SKIN: no rashes, no acanthosis  MSK: no deformities, full ROM of all extremities  NEURO: no tremors, DTR normal  PSYCH: AOX3, appropriate mood, affect normal      Results Review:     I reviewed the patient's new clinical results.    Lab Results   Component Value Date    HGBA1C 7.8 (H) 09/09/2020    HGBA1C 9.3 (H) 06/15/2020    HGBA1C 10.9 (H) 03/17/2020      Lab Results   Component Value Date    GLUCOSE 61 (L) 09/09/2020    BUN 21 09/09/2020    CREATININE 0.92 09/09/2020    EGFRIFNONA 83 09/09/2020    BCR 22.8 09/09/2020    K 3.6 09/09/2020    CO2 22.6 09/09/2020    CALCIUM 9.2 09/09/2020    ALBUMIN 4.60 09/09/2020    LABIL2 1.4 03/14/2019    AST 58 (H) 09/09/2020    ALT 77 (H) 09/09/2020    CHOL 108 09/09/2020    TRIG 123 09/09/2020    LDL 61 09/09/2020    HDL 22 (L) 09/09/2020     Lab Results   Component Value Date    TSH 3.930 03/17/2020         Medication Review: Reviewed.       Current Outpatient Medications:   •  ACCU-CHEK FASTCLIX LANCETS misc, Pt is to check blood sugars up to QID, Disp: , Rfl:   •  Cyanocobalamin (VITAMIN B-12) 1000 MCG sublingual tablet, Place 1,000 mcg under the tongue Daily., Disp: 100 each, Rfl: 4  •  Dapagliflozin Propanediol (Farxiga) 10 MG tablet, Take 10 mg by mouth Daily., Disp: 30 tablet, Rfl: 6  •  enalapril (VASOTEC) 20 MG tablet, Take 20 mg by mouth Daily., Disp: , Rfl:    •  glucose blood test strip, Pt is to test blood sugars up to QID, Disp: , Rfl:   •  icosapent ethyl (VASCEPA) 1 g capsule capsule, Take 2 g by mouth., Disp: , Rfl:   •  Insulin Degludec (Tresiba FlexTouch) 200 UNIT/ML solution pen-injector pen injection, Inject 140 units subcu daily., Disp: 15 pen, Rfl: 4  •  Insulin Disposable Pump (V-GO 40) kit, , Disp: , Rfl:   •  metFORMIN ER (GLUCOPHAGE-XR) 500 MG 24 hr tablet, Take 2 tablets twice a day., Disp: 360 tablet, Rfl: 6  •  omeprazole (priLOSEC) 20 MG capsule, Take 20 mg by mouth Daily. before a meal, Disp: , Rfl:   •  Semaglutide, 1 MG/DOSE, (Ozempic, 1 MG/DOSE,) 2 MG/1.5ML solution pen-injector, Inject 1 mg under the skin into the appropriate area as directed 1 (One) Time Per Week., Disp: 9 mL, Rfl: 4  •  simvastatin (ZOCOR) 40 MG tablet, Take 40 mg by mouth Every Night., Disp: , Rfl:   •  topiramate (TOPAMAX) 50 MG tablet, Take 150 mg by mouth 2 (Two) Times a Day., Disp: , Rfl:   •  Victoza 18 MG/3ML solution pen-injector injection, , Disp: , Rfl:       Assessment/Plan   1.  Diabetes mellitus type 2: Uncontrolled with A1c at 7.8%.  At this time we will continue his metformin with Farxiga and Tresiba.  He will finish his current Victoza supplies and will go on to Ozempic after that.  He is advised to continue to work on his diet and activity and needs to bring blood sugar records for review.  Advised to always keep glucose source with him in case of low blood sugar and get regular eye exam and flu vaccine.  2.  Hypertension: Well-controlled, continue current medication  3.  Hyperlipidemia: Well-controlled, will follow lipid panel.    4.  Diabetic peripheral neuropathy: Improving.  5.  Obesity: Advised to continue to work on diet and activity.            Sheila Morales MD FACE.

## 2020-09-14 NOTE — PATIENT INSTRUCTIONS
Please start Victoza when you run out of your current supply  Start Ozempic after you finish Victoza  Always keep glucose source in case of low blood sugar  Continue to work on diet and activity  Annual eye exam and flu vaccine  Follow-up in 6 months with labs.

## 2020-12-18 RX ORDER — DAPAGLIFLOZIN 10 MG/1
TABLET, FILM COATED ORAL
Qty: 90 TABLET | Refills: 2 | Status: SHIPPED | OUTPATIENT
Start: 2020-12-18 | End: 2021-03-22 | Stop reason: SDUPTHER

## 2020-12-18 RX ORDER — INSULIN DEGLUDEC 200 U/ML
INJECTION, SOLUTION SUBCUTANEOUS
Qty: 66 PEN | Refills: 3 | Status: SHIPPED | OUTPATIENT
Start: 2020-12-18 | End: 2021-03-22

## 2020-12-31 ENCOUNTER — HOSPITAL ENCOUNTER (OUTPATIENT)
Dept: HOSPITAL 83 - RESCLI | Age: 65
Discharge: HOME | End: 2020-12-31
Attending: INTERNAL MEDICINE
Payer: COMMERCIAL

## 2020-12-31 DIAGNOSIS — N40.1: ICD-10-CM

## 2020-12-31 DIAGNOSIS — Z23: ICD-10-CM

## 2020-12-31 DIAGNOSIS — E11.9: Primary | ICD-10-CM

## 2020-12-31 DIAGNOSIS — F09: ICD-10-CM

## 2020-12-31 DIAGNOSIS — M19.90: ICD-10-CM

## 2020-12-31 DIAGNOSIS — J32.9: ICD-10-CM

## 2020-12-31 DIAGNOSIS — E03.9: ICD-10-CM

## 2020-12-31 DIAGNOSIS — E55.9: ICD-10-CM

## 2020-12-31 DIAGNOSIS — I10: ICD-10-CM

## 2020-12-31 DIAGNOSIS — Z88.8: ICD-10-CM

## 2020-12-31 DIAGNOSIS — F32.0: ICD-10-CM

## 2020-12-31 DIAGNOSIS — Z79.82: ICD-10-CM

## 2020-12-31 DIAGNOSIS — Z86.73: ICD-10-CM

## 2020-12-31 DIAGNOSIS — Z79.899: ICD-10-CM

## 2021-03-02 RX ORDER — METFORMIN HYDROCHLORIDE 500 MG/1
TABLET, EXTENDED RELEASE ORAL
Qty: 180 TABLET | Refills: 0 | Status: SHIPPED | OUTPATIENT
Start: 2021-03-02 | End: 2021-03-22 | Stop reason: SDUPTHER

## 2021-03-15 ENCOUNTER — LAB (OUTPATIENT)
Dept: LAB | Facility: HOSPITAL | Age: 66
End: 2021-03-15

## 2021-03-15 DIAGNOSIS — I10 ESSENTIAL HYPERTENSION: ICD-10-CM

## 2021-03-15 DIAGNOSIS — Z79.4 TYPE 2 DIABETES MELLITUS WITH HYPERGLYCEMIA, WITH LONG-TERM CURRENT USE OF INSULIN (HCC): ICD-10-CM

## 2021-03-15 DIAGNOSIS — E78.2 MIXED HYPERLIPIDEMIA: ICD-10-CM

## 2021-03-15 DIAGNOSIS — E11.65 TYPE 2 DIABETES MELLITUS WITH HYPERGLYCEMIA, WITH LONG-TERM CURRENT USE OF INSULIN (HCC): ICD-10-CM

## 2021-03-15 LAB
ALBUMIN SERPL-MCNC: 4.4 G/DL (ref 3.5–5.2)
ALBUMIN UR-MCNC: <1.2 MG/DL
ALBUMIN/GLOB SERPL: 1.8 G/DL
ALP SERPL-CCNC: 48 U/L (ref 39–117)
ALT SERPL W P-5'-P-CCNC: 86 U/L (ref 1–41)
ANION GAP SERPL CALCULATED.3IONS-SCNC: 10.7 MMOL/L (ref 5–15)
AST SERPL-CCNC: 57 U/L (ref 1–40)
BILIRUB SERPL-MCNC: 0.3 MG/DL (ref 0–1.2)
BUN SERPL-MCNC: 13 MG/DL (ref 8–23)
BUN/CREAT SERPL: 15.1 (ref 7–25)
CALCIUM SPEC-SCNC: 9 MG/DL (ref 8.6–10.5)
CHLORIDE SERPL-SCNC: 104 MMOL/L (ref 98–107)
CHOLEST SERPL-MCNC: 105 MG/DL (ref 0–200)
CO2 SERPL-SCNC: 26.3 MMOL/L (ref 22–29)
CREAT SERPL-MCNC: 0.86 MG/DL (ref 0.76–1.27)
CREAT UR-MCNC: 159.6 MG/DL
GFR SERPL CREATININE-BSD FRML MDRD: 90 ML/MIN/1.73
GLOBULIN UR ELPH-MCNC: 2.4 GM/DL
GLUCOSE SERPL-MCNC: 90 MG/DL (ref 65–99)
HDLC SERPL-MCNC: 23 MG/DL (ref 40–60)
LDLC SERPL CALC-MCNC: 47 MG/DL (ref 0–100)
LDLC/HDLC SERPL: 1.68 {RATIO}
MICROALBUMIN/CREAT UR: NORMAL MG/G{CREAT}
POTASSIUM SERPL-SCNC: 3.9 MMOL/L (ref 3.5–5.2)
PROT SERPL-MCNC: 6.8 G/DL (ref 6–8.5)
SODIUM SERPL-SCNC: 141 MMOL/L (ref 136–145)
TRIGL SERPL-MCNC: 217 MG/DL (ref 0–150)
VLDLC SERPL-MCNC: 35 MG/DL (ref 5–40)

## 2021-03-15 PROCEDURE — 36415 COLL VENOUS BLD VENIPUNCTURE: CPT

## 2021-03-15 PROCEDURE — 82570 ASSAY OF URINE CREATININE: CPT

## 2021-03-15 PROCEDURE — 80053 COMPREHEN METABOLIC PANEL: CPT

## 2021-03-15 PROCEDURE — 82043 UR ALBUMIN QUANTITATIVE: CPT

## 2021-03-15 PROCEDURE — 80061 LIPID PANEL: CPT

## 2021-03-15 PROCEDURE — 83036 HEMOGLOBIN GLYCOSYLATED A1C: CPT

## 2021-03-17 LAB — HBA1C MFR BLD: 6.8 % (ref 3.5–5.6)

## 2021-03-22 ENCOUNTER — OFFICE VISIT (OUTPATIENT)
Dept: ENDOCRINOLOGY | Facility: CLINIC | Age: 66
End: 2021-03-22

## 2021-03-22 VITALS
TEMPERATURE: 97.3 F | WEIGHT: 213 LBS | SYSTOLIC BLOOD PRESSURE: 122 MMHG | HEART RATE: 89 BPM | DIASTOLIC BLOOD PRESSURE: 75 MMHG | OXYGEN SATURATION: 97 % | HEIGHT: 65 IN | BODY MASS INDEX: 35.49 KG/M2

## 2021-03-22 DIAGNOSIS — I10 ESSENTIAL HYPERTENSION: ICD-10-CM

## 2021-03-22 DIAGNOSIS — E78.2 MIXED HYPERLIPIDEMIA: ICD-10-CM

## 2021-03-22 DIAGNOSIS — E66.09 CLASS 2 OBESITY DUE TO EXCESS CALORIES WITHOUT SERIOUS COMORBIDITY WITH BODY MASS INDEX (BMI) OF 35.0 TO 35.9 IN ADULT: ICD-10-CM

## 2021-03-22 DIAGNOSIS — E11.42 DIABETIC PERIPHERAL NEUROPATHY (HCC): ICD-10-CM

## 2021-03-22 DIAGNOSIS — E11.65 TYPE 2 DIABETES MELLITUS WITH HYPERGLYCEMIA, WITH LONG-TERM CURRENT USE OF INSULIN (HCC): Primary | ICD-10-CM

## 2021-03-22 DIAGNOSIS — Z79.4 TYPE 2 DIABETES MELLITUS WITH HYPERGLYCEMIA, WITH LONG-TERM CURRENT USE OF INSULIN (HCC): Primary | ICD-10-CM

## 2021-03-22 LAB — GLUCOSE BLDC GLUCOMTR-MCNC: 156 MG/DL (ref 70–105)

## 2021-03-22 PROCEDURE — 99214 OFFICE O/P EST MOD 30 MIN: CPT | Performed by: INTERNAL MEDICINE

## 2021-03-22 PROCEDURE — 82962 GLUCOSE BLOOD TEST: CPT | Performed by: INTERNAL MEDICINE

## 2021-03-22 RX ORDER — TRAMADOL HYDROCHLORIDE 50 MG/1
50 TABLET ORAL EVERY 6 HOURS PRN
COMMUNITY
Start: 2021-02-25 | End: 2021-03-22

## 2021-03-22 RX ORDER — INSULIN DEGLUDEC 200 U/ML
120 INJECTION, SOLUTION SUBCUTANEOUS DAILY
Qty: 66 PEN | Refills: 3 | Status: SHIPPED | OUTPATIENT
Start: 2021-03-22 | End: 2021-09-27 | Stop reason: SDUPTHER

## 2021-03-22 RX ORDER — ICOSAPENT ETHYL 1000 MG/1
2 CAPSULE ORAL 2 TIMES DAILY WITH MEALS
Qty: 360 CAPSULE | Refills: 4 | Status: SHIPPED | OUTPATIENT
Start: 2021-03-22 | End: 2022-07-12 | Stop reason: SDUPTHER

## 2021-03-22 RX ORDER — SIMVASTATIN 40 MG
40 TABLET ORAL NIGHTLY
Qty: 90 TABLET | Refills: 4 | Status: SHIPPED | OUTPATIENT
Start: 2021-03-22 | End: 2021-09-27 | Stop reason: SDUPTHER

## 2021-03-22 RX ORDER — METFORMIN HYDROCHLORIDE 500 MG/1
TABLET, EXTENDED RELEASE ORAL
Qty: 180 TABLET | Refills: 4 | Status: SHIPPED | OUTPATIENT
Start: 2021-03-22 | End: 2021-07-19

## 2021-03-22 RX ORDER — DAPAGLIFLOZIN 10 MG/1
1 TABLET, FILM COATED ORAL DAILY
Qty: 90 TABLET | Refills: 4 | Status: SHIPPED | OUTPATIENT
Start: 2021-03-22 | End: 2021-09-27 | Stop reason: SDUPTHER

## 2021-03-22 RX ORDER — SEMAGLUTIDE 1.34 MG/ML
1 INJECTION, SOLUTION SUBCUTANEOUS WEEKLY
Qty: 6 ML | Refills: 4 | Status: SHIPPED | OUTPATIENT
Start: 2021-03-22 | End: 2021-08-30

## 2021-03-22 RX ORDER — ENALAPRIL MALEATE 20 MG/1
20 TABLET ORAL DAILY
Qty: 90 TABLET | Refills: 4 | Status: SHIPPED | OUTPATIENT
Start: 2021-03-22 | End: 2021-09-27 | Stop reason: SDUPTHER

## 2021-03-22 NOTE — PATIENT INSTRUCTIONS
Continue current medication  Always keep glucose source in case of low blood sugar  Annual eye exam and flu vaccine  Please work on your diet and need to lose weight as your liver enzymes are elevated most likely due to fat in the liver.

## 2021-03-22 NOTE — PROGRESS NOTES
Endocrine Progress Note Outpatient     Patient Care Team:  Jo Ann Mazariegos APRN as PCP - General    Chief Complaint: Follow-up type 2 diabetes    HPI: 65-year-old male with history of type 2 diabetes, hypertension, hyperlipidemia is here for follow-up.    For type 2 diabetes: He is currently on metformin 1000 mg twice a day, Farxiga 10 once a day and Tresiba 120 units subcu daily, Ozempic 1.0 mg weekly.  He is tolerating it well.  He is working on his diet.  His average blood sugars about 160s that I reviewed through his meter.  Denies low blood sugars.  He did have diabetes education back in [place] in .    Hypertension: Well-controlled.    Hyperlipidemia: On simvastatin.    Diabetic peripheral neuropathy: On vitamin D and B12 supplementation.    Obesity: He is advised to work on diet.       Past Medical History:   Diagnosis Date   • Angina pectoris (CMS/HCC)    • Diabetes mellitus (CMS/[facility])     type 2   • Hyperlipidemia    • Hypertension    • Type 2 diabetes mellitus (CMS/HCC)        Social History     Socioeconomic History   • Marital status:      Spouse name: Not on file   • Number of children: Not on file   • Years of education: Not on file   • Highest education level: Not on file   Tobacco Use   • Smoking status: Former Smoker     Quit date:      Years since quittin.2   • Smokeless tobacco: Never Used   Vaping Use   • Vaping Use: Never used   Substance and Sexual Activity   • Alcohol use: No   • Drug use: No   • Sexual activity: Defer       Family History   Problem Relation Age of Onset   • Cancer Father    • Diabetes Brother    • Hyperlipidemia Mother    • Diabetes Maternal Uncle        No Known Allergies    ROS:   Constitutional:  Denies fatigue, tiredness.    Eyes:  Denies change in visual acuity   HENT:  Denies nasal congestion or sore throat   Respiratory: denies cough, shortness of breath.   Cardiovascular:  denies chest pain, edema   GI:  Denies abdominal pain, nausea, vomiting.    Musculoskeletal:  Denies back pain or joint pain   Integument:  Denies dry skin and rash   Neurologic:  Denies headache, focal weakness or sensory changes   Endocrine:  Denies polyuria or polydipsia   Psychiatric:  Denies depression or anxiety      Vitals:    03/22/21 1115   BP: 122/75   Pulse: 89   Temp: 97.3 °F (36.3 °C)   SpO2: 97%     BMI: 35.4    Physical Exam:  GEN: NAD, conversant, Obese  EYES: EOMI, PERRL, no conjunctival erythema  NECK: no thyromegaly, full ROM   CV: RRR, no murmurs/rubs/gallops, no peripheral edema  LUNG: CTAB, no wheezes/rales/ronchi  SKIN: no rashes, no acanthosis  MSK: no deformities, full ROM of all extremities  NEURO: no tremors, DTR normal  PSYCH: AOX3, appropriate mood, affect normal  FEET: No ulcers or calluses seen at this time.  Good monofilament test on both feet and good dorsal pedis pulses on both feet.    Results Review:     I reviewed the patient's new clinical results.    Lab Results   Component Value Date    HGBA1C 6.8 (H) 03/15/2021    HGBA1C 7.8 (H) 09/09/2020    HGBA1C 9.3 (H) 06/15/2020      Lab Results   Component Value Date    GLUCOSE 90 03/15/2021    BUN 13 03/15/2021    CREATININE 0.86 03/15/2021    EGFRIFNONA 90 03/15/2021    BCR 15.1 03/15/2021    K 3.9 03/15/2021    CO2 26.3 03/15/2021    CALCIUM 9.0 03/15/2021    ALBUMIN 4.40 03/15/2021    LABIL2 1.4 03/14/2019    AST 57 (H) 03/15/2021    ALT 86 (H) 03/15/2021    CHOL 105 03/15/2021    TRIG 217 (H) 03/15/2021    LDL 47 03/15/2021    HDL 23 (L) 03/15/2021     Lab Results   Component Value Date    TSH 3.930 03/17/2020         Medication Review: Reviewed.       Current Outpatient Medications:   •  Cyanocobalamin (VITAMIN B-12) 1000 MCG sublingual tablet, Place 1,000 mcg under the tongue Daily., Disp: 100 each, Rfl: 4  •  enalapril (VASOTEC) 20 MG tablet, Take 20 mg by mouth Daily., Disp: , Rfl:   •  Farxiga 10 MG tablet, TAKE 10 MG BY MOUTH DAILY., Disp: 90 tablet, Rfl: 2  •  icosapent ethyl (VASCEPA) 1 g  capsule capsule, Take 2 g by mouth., Disp: , Rfl:   •  metFORMIN ER (GLUCOPHAGE-XR) 500 MG 24 hr tablet, TAKE 1 TABLET BY MOUTH TWICE A DAY, Disp: 180 tablet, Rfl: 0  •  omeprazole (priLOSEC) 20 MG capsule, Take 20 mg by mouth Daily. before a meal, Disp: , Rfl:   •  Semaglutide (OZEMPIC, 1 MG/DOSE, SC), Inject 1 mg under the skin into the appropriate area as directed 1 (One) Time Per Week., Disp: , Rfl:   •  simvastatin (ZOCOR) 40 MG tablet, Take 40 mg by mouth Every Night., Disp: , Rfl:   •  topiramate (TOPAMAX) 50 MG tablet, Take 150 mg by mouth 2 (Two) Times a Day., Disp: , Rfl:   •  Tresiba FlexTouch 200 UNIT/ML solution pen-injector pen injection, INJECT 140 UNITS SUBCU DAILY., Disp: 66 pen, Rfl: 3  •  traMADol (ULTRAM) 50 MG tablet, Take 50 mg by mouth Every 6 (Six) Hours As Needed., Disp: , Rfl:       Assessment/Plan   1.  Diabetes mellitus type 2: Uncontrolled with A1c at 6.8%.  At this time we will continue, Ozempic,  metformin with Farxiga and Tresiba.  He is advised to continue to work on his diet and activity and needs to bring blood sugar records for review.  Advised to always keep glucose source with him in case of low blood sugar and get regular eye exam and flu vaccine.    2.  Hypertension: Well-controlled, continue current medication    3.  Hyperlipidemia: Well-controlled, will follow lipid panel.      4.  Diabetic peripheral neuropathy: Improving.    5.  Obesity: Advised to continue to work on diet and activity.            Sheila Morales MD FACE.

## 2021-03-30 RX ORDER — LIRAGLUTIDE 6 MG/ML
INJECTION SUBCUTANEOUS
Refills: 4 | OUTPATIENT
Start: 2021-03-30

## 2021-07-04 ENCOUNTER — HOSPITAL ENCOUNTER (OUTPATIENT)
Dept: HOSPITAL 83 - LAB | Age: 66
Discharge: HOME | End: 2021-07-04
Attending: STUDENT IN AN ORGANIZED HEALTH CARE EDUCATION/TRAINING PROGRAM
Payer: COMMERCIAL

## 2021-07-04 DIAGNOSIS — E66.9: ICD-10-CM

## 2021-07-04 DIAGNOSIS — E78.2: ICD-10-CM

## 2021-07-04 DIAGNOSIS — E11.9: ICD-10-CM

## 2021-07-04 DIAGNOSIS — E55.9: ICD-10-CM

## 2021-07-04 DIAGNOSIS — I10: Primary | ICD-10-CM

## 2021-07-04 LAB
ALBUMIN SERPL-MCNC: 3.6 GM/DL (ref 3.1–4.5)
ALP SERPL-CCNC: 103 U/L (ref 45–117)
ALT SERPL W P-5'-P-CCNC: 17 U/L (ref 12–78)
AST SERPL-CCNC: 6 IU/L (ref 3–35)
BASOPHILS # BLD AUTO: 0 10*3/UL (ref 0–0.1)
BASOPHILS NFR BLD AUTO: 0.8 % (ref 0–1)
BUN SERPL-MCNC: 29 MG/DL (ref 7–24)
CHLORIDE SERPL-SCNC: 112 MMOL/L (ref 98–107)
CHOLEST SERPL-MCNC: 175 MG/DL (ref ?–200)
CREAT SERPL-MCNC: 1.46 MG/DL (ref 0.7–1.3)
EOSINOPHIL # BLD AUTO: 0.2 10*3/UL (ref 0–0.4)
EOSINOPHIL # BLD AUTO: 4.6 % (ref 1–4)
ERYTHROCYTE [DISTWIDTH] IN BLOOD BY AUTOMATED COUNT: 12.9 % (ref 0–14.5)
HCT VFR BLD AUTO: 39.8 % (ref 42–52)
LDLC SERPL DIRECT ASSAY-MCNC: 103 MG/DL (ref 9–159)
LYMPHOCYTES # BLD AUTO: 0.5 10*3/UL (ref 1.3–4.4)
LYMPHOCYTES NFR BLD AUTO: 9.8 % (ref 27–41)
MCH RBC QN AUTO: 28.9 PG (ref 27–31)
MCHC RBC AUTO-ENTMCNC: 33.2 G/DL (ref 33–37)
MCV RBC AUTO: 87.3 FL (ref 80–94)
MONOCYTES # BLD AUTO: 0.4 10*3/UL (ref 0.1–1)
MONOCYTES NFR BLD MANUAL: 7.1 % (ref 3–9)
NEUT #: 4 10*3/UL (ref 2.3–7.9)
NEUT %: 77.5 % (ref 47–73)
NRBC BLD QL AUTO: 0 % (ref 0–0)
PLATELET # BLD AUTO: 244 10*3/UL (ref 130–400)
PMV BLD AUTO: 10.1 FL (ref 9.6–12.3)
POTASSIUM SERPL-SCNC: 4.5 MMOL/L (ref 3.5–5.1)
PROT SERPL-MCNC: 7.5 GM/DL (ref 6.4–8.2)
RBC # BLD AUTO: 4.56 10*6/UL (ref 4.5–5.9)
SODIUM SERPL-SCNC: 138 MMOL/L (ref 136–145)
TRIGL SERPL-MCNC: 146 MG/DL (ref ?–150)
WBC NRBC COR # BLD AUTO: 5.2 10*3/UL (ref 4.8–10.8)

## 2021-07-06 ENCOUNTER — HOSPITAL ENCOUNTER (OUTPATIENT)
Dept: HOSPITAL 83 - RESCLI | Age: 66
Discharge: HOME | End: 2021-07-06
Attending: INTERNAL MEDICINE
Payer: COMMERCIAL

## 2021-07-06 DIAGNOSIS — R41.81: ICD-10-CM

## 2021-07-06 DIAGNOSIS — E11.9: ICD-10-CM

## 2021-07-06 DIAGNOSIS — M19.90: ICD-10-CM

## 2021-07-06 DIAGNOSIS — E03.9: ICD-10-CM

## 2021-07-06 DIAGNOSIS — Z11.59: ICD-10-CM

## 2021-07-06 DIAGNOSIS — Z79.899: ICD-10-CM

## 2021-07-06 DIAGNOSIS — K21.9: Primary | ICD-10-CM

## 2021-07-06 DIAGNOSIS — E78.2: ICD-10-CM

## 2021-07-06 DIAGNOSIS — F41.9: ICD-10-CM

## 2021-07-06 DIAGNOSIS — Z79.82: ICD-10-CM

## 2021-07-06 DIAGNOSIS — E55.9: ICD-10-CM

## 2021-07-06 DIAGNOSIS — Z98.890: ICD-10-CM

## 2021-07-06 DIAGNOSIS — Z91.09: ICD-10-CM

## 2021-07-06 DIAGNOSIS — I10: ICD-10-CM

## 2021-07-06 DIAGNOSIS — Z12.5: ICD-10-CM

## 2021-07-06 LAB
CREAT UR-MCNC: 220.7 MG/DL
MICRO ALBUMIN/CRE RATIO: 8 (ref 0–29)

## 2021-07-19 RX ORDER — METFORMIN HYDROCHLORIDE 500 MG/1
TABLET, EXTENDED RELEASE ORAL
Qty: 360 TABLET | Refills: 6 | Status: SHIPPED | OUTPATIENT
Start: 2021-07-19 | End: 2021-09-27

## 2021-08-02 ENCOUNTER — TRANSCRIBE ORDERS (OUTPATIENT)
Dept: ADMINISTRATIVE | Facility: HOSPITAL | Age: 66
End: 2021-08-02

## 2021-08-02 ENCOUNTER — OFFICE (AMBULATORY)
Dept: URBAN - METROPOLITAN AREA CLINIC 64 | Facility: CLINIC | Age: 66
End: 2021-08-02

## 2021-08-02 VITALS
HEIGHT: 65 IN | DIASTOLIC BLOOD PRESSURE: 88 MMHG | SYSTOLIC BLOOD PRESSURE: 134 MMHG | WEIGHT: 200 LBS | HEART RATE: 66 BPM

## 2021-08-02 DIAGNOSIS — R10.9 UNSPECIFIED ABDOMINAL PAIN: ICD-10-CM

## 2021-08-02 DIAGNOSIS — R19.7 DIARRHEA, UNSPECIFIED: ICD-10-CM

## 2021-08-02 DIAGNOSIS — R10.9 RIGHT SIDED ABDOMINAL PAIN: Primary | ICD-10-CM

## 2021-08-02 DIAGNOSIS — R19.7 DIARRHEA, UNSPECIFIED TYPE: ICD-10-CM

## 2021-08-02 PROCEDURE — 99203 OFFICE O/P NEW LOW 30 MIN: CPT | Performed by: NURSE PRACTITIONER

## 2021-08-02 RX ORDER — DICYCLOMINE HYDROCHLORIDE 10 MG/1
30 CAPSULE ORAL
Qty: 270 | Refills: 1 | Status: COMPLETED
Start: 2021-08-02 | End: 2023-04-05

## 2021-08-04 ENCOUNTER — TRANSCRIBE ORDERS (OUTPATIENT)
Dept: ADMINISTRATIVE | Facility: HOSPITAL | Age: 66
End: 2021-08-04

## 2021-08-04 DIAGNOSIS — R10.9 RIGHT SIDED ABDOMINAL PAIN: Primary | ICD-10-CM

## 2021-08-04 DIAGNOSIS — R19.7 DIARRHEA, UNSPECIFIED TYPE: ICD-10-CM

## 2021-08-04 DIAGNOSIS — R74.01 ELEVATED TRANSAMINASE LEVEL: ICD-10-CM

## 2021-08-04 DIAGNOSIS — R74.8 ELEVATED AMYLASE: ICD-10-CM

## 2021-08-05 ENCOUNTER — TRANSCRIBE ORDERS (OUTPATIENT)
Dept: ADMINISTRATIVE | Facility: HOSPITAL | Age: 66
End: 2021-08-05

## 2021-08-05 DIAGNOSIS — R10.9 ABDOMINAL CRAMPS: Primary | ICD-10-CM

## 2021-08-05 DIAGNOSIS — R74.02 NONSPECIFIC ELEVATION OF LEVELS OF TRANSAMINASE OR LACTIC ACID DEHYDROGENASE (LDH): ICD-10-CM

## 2021-08-05 DIAGNOSIS — R74.8 ACID PHOSPHATASE ELEVATED: ICD-10-CM

## 2021-08-05 DIAGNOSIS — R74.01 NONSPECIFIC ELEVATION OF LEVELS OF TRANSAMINASE OR LACTIC ACID DEHYDROGENASE (LDH): ICD-10-CM

## 2021-08-05 DIAGNOSIS — R19.7 DIARRHEA, UNSPECIFIED TYPE: ICD-10-CM

## 2021-08-09 ENCOUNTER — APPOINTMENT (OUTPATIENT)
Dept: ULTRASOUND IMAGING | Facility: HOSPITAL | Age: 66
End: 2021-08-09

## 2021-08-16 ENCOUNTER — HOSPITAL ENCOUNTER (OUTPATIENT)
Dept: CT IMAGING | Facility: HOSPITAL | Age: 66
Discharge: HOME | End: 2021-08-16
Admitting: NURSE PRACTITIONER

## 2021-08-16 DIAGNOSIS — R74.01 NONSPECIFIC ELEVATION OF LEVELS OF TRANSAMINASE OR LACTIC ACID DEHYDROGENASE (LDH): ICD-10-CM

## 2021-08-16 DIAGNOSIS — R10.9 ABDOMINAL CRAMPS: ICD-10-CM

## 2021-08-16 DIAGNOSIS — R19.7 DIARRHEA, UNSPECIFIED TYPE: ICD-10-CM

## 2021-08-16 DIAGNOSIS — R74.02 NONSPECIFIC ELEVATION OF LEVELS OF TRANSAMINASE OR LACTIC ACID DEHYDROGENASE (LDH): ICD-10-CM

## 2021-08-16 DIAGNOSIS — R74.8 ACID PHOSPHATASE ELEVATED: ICD-10-CM

## 2021-08-16 LAB — CREAT BLDA-MCNC: 1.2 MG/DL (ref 0.6–1.3)

## 2021-08-16 PROCEDURE — 82565 ASSAY OF CREATININE: CPT

## 2021-08-16 PROCEDURE — 0 IOPAMIDOL PER 1 ML: Performed by: NURSE PRACTITIONER

## 2021-08-16 PROCEDURE — 74177 CT ABD & PELVIS W/CONTRAST: CPT

## 2021-08-16 RX ADMIN — IOPAMIDOL 100 ML: 755 INJECTION, SOLUTION INTRAVENOUS at 08:13

## 2021-08-30 RX ORDER — SEMAGLUTIDE 1.34 MG/ML
1 INJECTION, SOLUTION SUBCUTANEOUS WEEKLY
Qty: 9 PEN | Refills: 4 | Status: SHIPPED | OUTPATIENT
Start: 2021-08-30 | End: 2021-09-27 | Stop reason: SDUPTHER

## 2021-08-31 ENCOUNTER — TELEPHONE (OUTPATIENT)
Dept: ENDOCRINOLOGY | Facility: CLINIC | Age: 66
End: 2021-08-31

## 2021-09-01 RX ORDER — OMEPRAZOLE 20 MG/1
20 CAPSULE, DELAYED RELEASE ORAL DAILY
Qty: 30 CAPSULE | Refills: 5 | Status: SHIPPED | OUTPATIENT
Start: 2021-09-01

## 2021-09-20 ENCOUNTER — LAB (OUTPATIENT)
Dept: LAB | Facility: HOSPITAL | Age: 66
End: 2021-09-20

## 2021-09-20 DIAGNOSIS — Z79.4 TYPE 2 DIABETES MELLITUS WITH HYPERGLYCEMIA, WITH LONG-TERM CURRENT USE OF INSULIN (HCC): ICD-10-CM

## 2021-09-20 DIAGNOSIS — E11.65 TYPE 2 DIABETES MELLITUS WITH HYPERGLYCEMIA, WITH LONG-TERM CURRENT USE OF INSULIN (HCC): ICD-10-CM

## 2021-09-20 LAB
ALBUMIN SERPL-MCNC: 4.2 G/DL (ref 3.5–5.2)
ALBUMIN UR-MCNC: <1.2 MG/DL
ALBUMIN/GLOB SERPL: 1.6 G/DL
ALP SERPL-CCNC: 49 U/L (ref 39–117)
ALT SERPL W P-5'-P-CCNC: 41 U/L (ref 1–41)
ANION GAP SERPL CALCULATED.3IONS-SCNC: 11.6 MMOL/L (ref 5–15)
AST SERPL-CCNC: 45 U/L (ref 1–40)
BILIRUB SERPL-MCNC: 0.4 MG/DL (ref 0–1.2)
BUN SERPL-MCNC: 18 MG/DL (ref 8–23)
BUN/CREAT SERPL: 17.1 (ref 7–25)
CALCIUM SPEC-SCNC: 9.4 MG/DL (ref 8.6–10.5)
CHLORIDE SERPL-SCNC: 108 MMOL/L (ref 98–107)
CHOLEST SERPL-MCNC: 111 MG/DL (ref 0–200)
CO2 SERPL-SCNC: 23.4 MMOL/L (ref 22–29)
CREAT SERPL-MCNC: 1.05 MG/DL (ref 0.76–1.27)
CREAT UR-MCNC: 120.2 MG/DL
GFR SERPL CREATININE-BSD FRML MDRD: 71 ML/MIN/1.73
GLOBULIN UR ELPH-MCNC: 2.6 GM/DL
GLUCOSE SERPL-MCNC: 62 MG/DL (ref 65–99)
HBA1C MFR BLD: 6.4 % (ref 3.5–5.6)
HDLC SERPL-MCNC: 23 MG/DL (ref 40–60)
LDLC SERPL CALC-MCNC: 69 MG/DL (ref 0–100)
LDLC/HDLC SERPL: 2.97 {RATIO}
MICROALBUMIN/CREAT UR: NORMAL MG/G{CREAT}
POTASSIUM SERPL-SCNC: 3.9 MMOL/L (ref 3.5–5.2)
PROT SERPL-MCNC: 6.8 G/DL (ref 6–8.5)
SODIUM SERPL-SCNC: 143 MMOL/L (ref 136–145)
TRIGL SERPL-MCNC: 99 MG/DL (ref 0–150)
VLDLC SERPL-MCNC: 19 MG/DL (ref 5–40)

## 2021-09-20 PROCEDURE — 80053 COMPREHEN METABOLIC PANEL: CPT

## 2021-09-20 PROCEDURE — 82570 ASSAY OF URINE CREATININE: CPT

## 2021-09-20 PROCEDURE — 80061 LIPID PANEL: CPT

## 2021-09-20 PROCEDURE — 83036 HEMOGLOBIN GLYCOSYLATED A1C: CPT

## 2021-09-20 PROCEDURE — 82043 UR ALBUMIN QUANTITATIVE: CPT

## 2021-09-20 PROCEDURE — 36415 COLL VENOUS BLD VENIPUNCTURE: CPT

## 2021-09-21 RX ORDER — CYCLOBENZAPRINE HCL 10 MG
10 TABLET ORAL EVERY 8 HOURS
COMMUNITY
Start: 2021-09-07 | End: 2021-09-27

## 2021-09-21 RX ORDER — TAMSULOSIN HYDROCHLORIDE 0.4 MG/1
1 CAPSULE ORAL DAILY
COMMUNITY
Start: 2021-09-07

## 2021-09-21 RX ORDER — CEPHALEXIN 500 MG/1
500 CAPSULE ORAL 2 TIMES DAILY
COMMUNITY
Start: 2021-09-09 | End: 2022-07-12

## 2021-09-21 RX ORDER — CLINDAMYCIN PHOSPHATE 11.9 MG/ML
SOLUTION TOPICAL
COMMUNITY
Start: 2021-09-09 | End: 2022-07-12

## 2021-09-21 RX ORDER — TRAMADOL HYDROCHLORIDE 50 MG/1
50 TABLET ORAL EVERY 6 HOURS PRN
COMMUNITY
Start: 2021-06-16 | End: 2021-09-27

## 2021-09-21 RX ORDER — METHYLPREDNISOLONE 4 MG/1
TABLET ORAL
COMMUNITY
Start: 2021-06-22 | End: 2021-09-27

## 2021-09-27 ENCOUNTER — OFFICE VISIT (OUTPATIENT)
Dept: ENDOCRINOLOGY | Facility: CLINIC | Age: 66
End: 2021-09-27

## 2021-09-27 VITALS
HEIGHT: 65 IN | SYSTOLIC BLOOD PRESSURE: 130 MMHG | BODY MASS INDEX: 34.22 KG/M2 | HEART RATE: 80 BPM | TEMPERATURE: 97.3 F | DIASTOLIC BLOOD PRESSURE: 76 MMHG | WEIGHT: 205.4 LBS

## 2021-09-27 DIAGNOSIS — E78.2 MIXED HYPERLIPIDEMIA: ICD-10-CM

## 2021-09-27 DIAGNOSIS — I10 ESSENTIAL HYPERTENSION: ICD-10-CM

## 2021-09-27 DIAGNOSIS — E11.42 DIABETIC PERIPHERAL NEUROPATHY (HCC): ICD-10-CM

## 2021-09-27 DIAGNOSIS — E11.65 TYPE 2 DIABETES MELLITUS WITH HYPERGLYCEMIA, WITH LONG-TERM CURRENT USE OF INSULIN (HCC): Primary | ICD-10-CM

## 2021-09-27 DIAGNOSIS — Z79.4 TYPE 2 DIABETES MELLITUS WITH HYPERGLYCEMIA, WITH LONG-TERM CURRENT USE OF INSULIN (HCC): Primary | ICD-10-CM

## 2021-09-27 DIAGNOSIS — E66.09 CLASS 1 OBESITY DUE TO EXCESS CALORIES WITHOUT SERIOUS COMORBIDITY WITH BODY MASS INDEX (BMI) OF 34.0 TO 34.9 IN ADULT: ICD-10-CM

## 2021-09-27 PROBLEM — E66.811 CLASS 1 OBESITY DUE TO EXCESS CALORIES WITHOUT SERIOUS COMORBIDITY WITH BODY MASS INDEX (BMI) OF 34.0 TO 34.9 IN ADULT: Status: ACTIVE | Noted: 2020-03-17

## 2021-09-27 LAB — GLUCOSE BLDC GLUCOMTR-MCNC: 184 MG/DL (ref 70–105)

## 2021-09-27 PROCEDURE — 99214 OFFICE O/P EST MOD 30 MIN: CPT | Performed by: INTERNAL MEDICINE

## 2021-09-27 PROCEDURE — 82962 GLUCOSE BLOOD TEST: CPT | Performed by: INTERNAL MEDICINE

## 2021-09-27 RX ORDER — SEMAGLUTIDE 1.34 MG/ML
1 INJECTION, SOLUTION SUBCUTANEOUS WEEKLY
Qty: 9 PEN | Refills: 4 | Status: SHIPPED | OUTPATIENT
Start: 2021-09-27 | End: 2022-07-12

## 2021-09-27 RX ORDER — INSULIN DEGLUDEC 200 U/ML
120 INJECTION, SOLUTION SUBCUTANEOUS DAILY
Qty: 66 PEN | Refills: 3 | Status: SHIPPED | OUTPATIENT
Start: 2021-09-27 | End: 2022-07-12 | Stop reason: SDUPTHER

## 2021-09-27 RX ORDER — TOPIRAMATE 100 MG/1
100 TABLET, FILM COATED ORAL 2 TIMES DAILY
COMMUNITY
Start: 2021-09-23

## 2021-09-27 RX ORDER — SIMVASTATIN 40 MG
40 TABLET ORAL NIGHTLY
Qty: 90 TABLET | Refills: 4 | Status: SHIPPED | OUTPATIENT
Start: 2021-09-27 | End: 2022-07-12 | Stop reason: SDUPTHER

## 2021-09-27 RX ORDER — DAPAGLIFLOZIN 10 MG/1
1 TABLET, FILM COATED ORAL DAILY
Qty: 90 TABLET | Refills: 4 | Status: SHIPPED | OUTPATIENT
Start: 2021-09-27 | End: 2022-07-12 | Stop reason: SDUPTHER

## 2021-09-27 RX ORDER — ENALAPRIL MALEATE 20 MG/1
20 TABLET ORAL DAILY
Qty: 90 TABLET | Refills: 4 | Status: SHIPPED | OUTPATIENT
Start: 2021-09-27 | End: 2022-07-12 | Stop reason: SDUPTHER

## 2021-09-27 NOTE — PROGRESS NOTES
Endocrine Progress Note Outpatient     Patient Care Team:  Caesar Limon MD as PCP - General (Family Medicine)    Chief Complaint: Follow-up type 2 diabetes    HPI: 65-year-old male with history of type 2 diabetes, hypertension, hyperlipidemia is here for follow-up.    For type 2 diabetes: He is currently on Farxiga 10 once a day and Tresiba 120 units subcu daily, Ozempic 1.0 mg weekly.  He is tolerating it well.  He is working on his diet. He had to stop Metformin due to GI side effects. He was on extended release Metformin. Denies low blood sugars.  He did have diabetes education back in [place] in .    Hypertension: Well-controlled.    Hyperlipidemia: On simvastatin.    Diabetic peripheral neuropathy: On vitamin D and B12 supplementation.    Obesity: He is advised to work on diet.       Past Medical History:   Diagnosis Date   • Angina pectoris (CMS/HCC)    • Diabetes mellitus (CMS/[facility])     type 2   • Hyperlipidemia    • Hypertension    • Type 2 diabetes mellitus (CMS/[facility])        Social History     Socioeconomic History   • Marital status:      Spouse name: Not on file   • Number of children: Not on file   • Years of education: Not on file   • Highest education level: Not on file   Tobacco Use   • Smoking status: Former Smoker     Quit date:      Years since quittin.7   • Smokeless tobacco: Never Used   Vaping Use   • Vaping Use: Never used   Substance and Sexual Activity   • Alcohol use: No   • Drug use: No   • Sexual activity: Defer       Family History   Problem Relation Age of Onset   • Cancer Father    • Diabetes Brother    • Hyperlipidemia Mother    • Diabetes Maternal Uncle        No Known Allergies    ROS:   Constitutional:  Denies fatigue, tiredness.    Eyes:  Denies change in visual acuity   HENT:  Denies nasal congestion or sore throat   Respiratory: denies cough, shortness of breath.   Cardiovascular:  denies chest pain, edema   GI:  Denies abdominal pain, nausea, vomiting.    Musculoskeletal:  Denies back pain or joint pain   Integument:  Denies dry skin and rash   Neurologic:  Denies headache, focal weakness or sensory changes   Endocrine:  Denies polyuria or polydipsia   Psychiatric:  Denies depression or anxiety      Vitals:    09/27/21 0805   BP: 130/76   Pulse: 80   Temp: 97.3 °F (36.3 °C)     BMI: 35.4    Physical Exam:  GEN: NAD, conversant, Obese  EYES: EOMI, PERRL, no conjunctival erythema  NECK: no thyromegaly, full ROM   CV: RRR, no murmurs/rubs/gallops, no peripheral edema  LUNG: CTAB, no wheezes/rales/ronchi  SKIN: no rashes, no acanthosis  MSK: no deformities, full ROM of all extremities  NEURO: no tremors, DTR normal  PSYCH: AOX3, appropriate mood, affect normal      Results Review:     I reviewed the patient's new clinical results.    Lab Results   Component Value Date    HGBA1C 6.4 (H) 09/20/2021    HGBA1C 6.8 (H) 03/15/2021    HGBA1C 7.8 (H) 09/09/2020      Lab Results   Component Value Date    GLUCOSE 62 (L) 09/20/2021    BUN 18 09/20/2021    CREATININE 1.05 09/20/2021    EGFRIFNONA 71 09/20/2021    BCR 17.1 09/20/2021    K 3.9 09/20/2021    CO2 23.4 09/20/2021    CALCIUM 9.4 09/20/2021    ALBUMIN 4.20 09/20/2021    LABIL2 1.4 03/14/2019    AST 45 (H) 09/20/2021    ALT 41 09/20/2021    CHOL 111 09/20/2021    TRIG 99 09/20/2021    LDL 69 09/20/2021    HDL 23 (L) 09/20/2021     Lab Results   Component Value Date    TSH 3.930 03/17/2020         Medication Review: Reviewed.       Current Outpatient Medications:   •  cephalexin (KEFLEX) 500 MG capsule, Take 500 mg by mouth 2 (Two) Times a Day. As needed for acne, Disp: , Rfl:   •  clindamycin (CLEOCIN T) 1 % external solution, APPLY TO FACE AND NECK DAILY, Disp: , Rfl:   •  Cyanocobalamin (VITAMIN B-12) 1000 MCG sublingual tablet, Place 1,000 mcg under the tongue Daily., Disp: 100 each, Rfl: 4  •  Dapagliflozin Propanediol (Farxiga) 10 MG tablet, Take 10 mg by mouth Daily. Take 1 tablet (10mg) by mouth daily., Disp: 90  tablet, Rfl: 4  •  enalapril (VASOTEC) 20 MG tablet, Take 1 tablet by mouth Daily., Disp: 90 tablet, Rfl: 4  •  icosapent ethyl (VASCEPA) 1 g capsule capsule, Take 2 g by mouth 2 (Two) Times a Day With Meals., Disp: 360 capsule, Rfl: 4  •  Insulin Degludec (Tresiba FlexTouch) 200 UNIT/ML solution pen-injector pen injection, Inject 120 Units under the skin into the appropriate area as directed Daily., Disp: 66 pen, Rfl: 3  •  omeprazole (priLOSEC) 20 MG capsule, Take 1 capsule by mouth Daily. before a meal, Disp: 30 capsule, Rfl: 5  •  Ozempic, 1 MG/DOSE, 2 MG/1.5ML solution pen-injector, INJECT 1 MG UNDER THE SKIN INTO THE APPROPRIATE AREA AS DIRECTED 1 (ONE) TIME PER WEEK., Disp: 9 pen, Rfl: 4  •  simvastatin (ZOCOR) 40 MG tablet, Take 1 tablet by mouth Every Night., Disp: 90 tablet, Rfl: 4  •  tamsulosin (FLOMAX) 0.4 MG capsule 24 hr capsule, Take 1 capsule by mouth Daily., Disp: , Rfl:   •  topiramate (TOPAMAX) 100 MG tablet, Take 100 mg by mouth 2 (Two) Times a Day., Disp: , Rfl:       Assessment/Plan   1.  Diabetes mellitus type 2: Uncontrolled with A1c at 6.4%.  At this time we will continue, Ozempic,   with Farxiga and Tresiba.  He is advised to continue to work on his diet and activity and needs to bring blood sugar records for review.  Advised to always keep glucose source with him in case of low blood sugar and get regular eye exam and flu vaccine.    2.  Hypertension: Well-controlled, continue current medication    3.  Hyperlipidemia: Well-controlled, will follow lipid panel.      4.  Diabetic peripheral neuropathy: Improving.    5.  Obesity: Advised to continue to work on diet and activity.            Sheila Morales MD FACE.

## 2021-09-27 NOTE — PATIENT INSTRUCTIONS
Continue current medication  Always keep glucose source in case of low blood sugar  Annual eye exam and flu vaccine  Labs before follow-up.

## 2021-10-01 ENCOUNTER — HOSPITAL ENCOUNTER (OUTPATIENT)
Dept: HOSPITAL 83 - LAB | Age: 66
Discharge: HOME | End: 2021-10-01
Attending: INTERNAL MEDICINE
Payer: COMMERCIAL

## 2021-10-01 DIAGNOSIS — Z12.5: Primary | ICD-10-CM

## 2021-10-01 DIAGNOSIS — Z72.89: ICD-10-CM

## 2021-10-01 DIAGNOSIS — Z11.59: ICD-10-CM

## 2021-10-01 DIAGNOSIS — E11.9: ICD-10-CM

## 2021-10-01 LAB
BUN SERPL-MCNC: 26 MG/DL (ref 7–24)
CHLORIDE SERPL-SCNC: 110 MMOL/L (ref 98–107)
CREAT SERPL-MCNC: 1.2 MG/DL (ref 0.7–1.3)
POTASSIUM SERPL-SCNC: 4.8 MMOL/L (ref 3.5–5.1)
SODIUM SERPL-SCNC: 141 MMOL/L (ref 136–145)

## 2021-10-08 ENCOUNTER — HOSPITAL ENCOUNTER (OUTPATIENT)
Dept: HOSPITAL 83 - RESCLI | Age: 66
Discharge: HOME | End: 2021-10-08
Attending: INTERNAL MEDICINE
Payer: COMMERCIAL

## 2021-10-08 DIAGNOSIS — Z79.82: ICD-10-CM

## 2021-10-08 DIAGNOSIS — E78.2: ICD-10-CM

## 2021-10-08 DIAGNOSIS — R41.81: ICD-10-CM

## 2021-10-08 DIAGNOSIS — K21.9: Primary | ICD-10-CM

## 2021-10-08 DIAGNOSIS — F41.9: ICD-10-CM

## 2021-10-08 DIAGNOSIS — I63.89: ICD-10-CM

## 2021-10-08 DIAGNOSIS — Z98.890: ICD-10-CM

## 2021-10-08 DIAGNOSIS — M19.90: ICD-10-CM

## 2021-10-08 DIAGNOSIS — E03.9: ICD-10-CM

## 2021-10-08 DIAGNOSIS — I82.461: ICD-10-CM

## 2021-10-08 DIAGNOSIS — Z91.09: ICD-10-CM

## 2021-10-08 DIAGNOSIS — E55.9: ICD-10-CM

## 2021-10-08 DIAGNOSIS — Z79.899: ICD-10-CM

## 2021-10-08 DIAGNOSIS — E11.9: ICD-10-CM

## 2022-02-25 ENCOUNTER — HOSPITAL ENCOUNTER (OUTPATIENT)
Dept: HOSPITAL 83 - RESCLI | Age: 67
Discharge: HOME | End: 2022-02-25
Attending: STUDENT IN AN ORGANIZED HEALTH CARE EDUCATION/TRAINING PROGRAM
Payer: COMMERCIAL

## 2022-02-25 DIAGNOSIS — E55.9: ICD-10-CM

## 2022-02-25 DIAGNOSIS — E03.9: ICD-10-CM

## 2022-02-25 DIAGNOSIS — Z79.899: ICD-10-CM

## 2022-02-25 DIAGNOSIS — I10: ICD-10-CM

## 2022-02-25 DIAGNOSIS — K21.9: Primary | ICD-10-CM

## 2022-02-25 DIAGNOSIS — I63.89: ICD-10-CM

## 2022-02-25 DIAGNOSIS — Z91.09: ICD-10-CM

## 2022-02-25 DIAGNOSIS — Z91.048: ICD-10-CM

## 2022-02-25 DIAGNOSIS — Z79.82: ICD-10-CM

## 2022-02-25 DIAGNOSIS — R41.81: ICD-10-CM

## 2022-02-25 DIAGNOSIS — I82.461: ICD-10-CM

## 2022-02-25 DIAGNOSIS — F41.9: ICD-10-CM

## 2022-02-25 DIAGNOSIS — E11.9: ICD-10-CM

## 2022-02-25 DIAGNOSIS — Z98.890: ICD-10-CM

## 2022-02-25 DIAGNOSIS — M19.90: ICD-10-CM

## 2022-02-25 DIAGNOSIS — Z88.8: ICD-10-CM

## 2022-02-25 DIAGNOSIS — E66.9: ICD-10-CM

## 2022-02-25 DIAGNOSIS — E78.2: ICD-10-CM

## 2022-04-15 ENCOUNTER — HOSPITAL ENCOUNTER (OUTPATIENT)
Dept: HOSPITAL 83 - RESCLI | Age: 67
Discharge: HOME | End: 2022-04-15
Attending: INTERNAL MEDICINE
Payer: COMMERCIAL

## 2022-04-15 DIAGNOSIS — I82.461: ICD-10-CM

## 2022-04-15 DIAGNOSIS — R41.81: ICD-10-CM

## 2022-04-15 DIAGNOSIS — Z79.82: ICD-10-CM

## 2022-04-15 DIAGNOSIS — I10: ICD-10-CM

## 2022-04-15 DIAGNOSIS — E11.9: ICD-10-CM

## 2022-04-15 DIAGNOSIS — H60.391: ICD-10-CM

## 2022-04-15 DIAGNOSIS — I63.89: ICD-10-CM

## 2022-04-15 DIAGNOSIS — Z91.09: ICD-10-CM

## 2022-04-15 DIAGNOSIS — E03.9: ICD-10-CM

## 2022-04-15 DIAGNOSIS — E78.2: ICD-10-CM

## 2022-04-15 DIAGNOSIS — F41.9: ICD-10-CM

## 2022-04-15 DIAGNOSIS — M19.90: ICD-10-CM

## 2022-04-15 DIAGNOSIS — K21.9: Primary | ICD-10-CM

## 2022-04-15 DIAGNOSIS — E55.9: ICD-10-CM

## 2022-05-26 ENCOUNTER — HOSPITAL ENCOUNTER (OUTPATIENT)
Dept: HOSPITAL 83 - LAB | Age: 67
Discharge: HOME | End: 2022-05-26
Attending: INTERNAL MEDICINE
Payer: COMMERCIAL

## 2022-05-26 DIAGNOSIS — E11.9: Primary | ICD-10-CM

## 2022-05-26 DIAGNOSIS — E03.9: ICD-10-CM

## 2022-05-26 DIAGNOSIS — E55.9: ICD-10-CM

## 2022-05-26 DIAGNOSIS — E78.2: ICD-10-CM

## 2022-05-26 LAB
ALP SERPL-CCNC: 87 U/L (ref 45–117)
ALT SERPL W P-5'-P-CCNC: 21 U/L (ref 12–78)
AST SERPL-CCNC: 6 IU/L (ref 3–35)
BUN SERPL-MCNC: 28 MG/DL (ref 7–24)
CHLORIDE SERPL-SCNC: 113 MMOL/L (ref 98–107)
CHOLEST SERPL-MCNC: 123 MG/DL (ref ?–200)
CREAT SERPL-MCNC: 1.33 MG/DL (ref 0.7–1.3)
LDLC SERPL DIRECT ASSAY-MCNC: 53 MG/DL (ref 9–159)
POTASSIUM SERPL-SCNC: 4.7 MMOL/L (ref 3.5–5.1)
PROT SERPL-MCNC: 7.2 GM/DL (ref 6.4–8.2)
SODIUM SERPL-SCNC: 142 MMOL/L (ref 136–145)
TRIGL SERPL-MCNC: 93 MG/DL (ref ?–150)

## 2022-05-27 LAB
CREAT UR-MCNC: 172.4 MG/DL
MICRO ALBUMIN/CRE RATIO: 6 (ref 0–29)

## 2022-07-05 ENCOUNTER — HOSPITAL ENCOUNTER (OUTPATIENT)
Dept: HOSPITAL 83 - RESCLI | Age: 67
Discharge: HOME | End: 2022-07-05
Attending: STUDENT IN AN ORGANIZED HEALTH CARE EDUCATION/TRAINING PROGRAM
Payer: COMMERCIAL

## 2022-07-05 ENCOUNTER — LAB (OUTPATIENT)
Dept: LAB | Facility: HOSPITAL | Age: 67
End: 2022-07-05

## 2022-07-05 DIAGNOSIS — Z88.8: ICD-10-CM

## 2022-07-05 DIAGNOSIS — I12.9: Primary | ICD-10-CM

## 2022-07-05 DIAGNOSIS — R26.81: ICD-10-CM

## 2022-07-05 DIAGNOSIS — N18.30: ICD-10-CM

## 2022-07-05 DIAGNOSIS — E11.65 TYPE 2 DIABETES MELLITUS WITH HYPERGLYCEMIA, WITH LONG-TERM CURRENT USE OF INSULIN: ICD-10-CM

## 2022-07-05 DIAGNOSIS — E78.2: ICD-10-CM

## 2022-07-05 DIAGNOSIS — N17.0: ICD-10-CM

## 2022-07-05 DIAGNOSIS — F41.9: ICD-10-CM

## 2022-07-05 DIAGNOSIS — M62.838: ICD-10-CM

## 2022-07-05 DIAGNOSIS — E11.22: ICD-10-CM

## 2022-07-05 DIAGNOSIS — Z79.01: ICD-10-CM

## 2022-07-05 DIAGNOSIS — Z90.49: ICD-10-CM

## 2022-07-05 DIAGNOSIS — E03.9: ICD-10-CM

## 2022-07-05 DIAGNOSIS — I82.461: ICD-10-CM

## 2022-07-05 DIAGNOSIS — Z79.82: ICD-10-CM

## 2022-07-05 DIAGNOSIS — R60.9: ICD-10-CM

## 2022-07-05 DIAGNOSIS — R41.81: ICD-10-CM

## 2022-07-05 DIAGNOSIS — Z79.899: ICD-10-CM

## 2022-07-05 DIAGNOSIS — Z79.4 TYPE 2 DIABETES MELLITUS WITH HYPERGLYCEMIA, WITH LONG-TERM CURRENT USE OF INSULIN: ICD-10-CM

## 2022-07-05 DIAGNOSIS — Z91.09: ICD-10-CM

## 2022-07-05 DIAGNOSIS — K21.9: ICD-10-CM

## 2022-07-05 LAB
ALBUMIN SERPL-MCNC: 4.7 G/DL (ref 3.5–5.2)
ALBUMIN UR-MCNC: <1.2 MG/DL
ALBUMIN/GLOB SERPL: 1.8 G/DL
ALP SERPL-CCNC: 73 U/L (ref 39–117)
ALT SERPL W P-5'-P-CCNC: 57 U/L (ref 1–41)
ANION GAP SERPL CALCULATED.3IONS-SCNC: 11 MMOL/L (ref 5–15)
AST SERPL-CCNC: 55 U/L (ref 1–40)
BILIRUB SERPL-MCNC: 0.4 MG/DL (ref 0–1.2)
BUN SERPL-MCNC: 27 MG/DL (ref 8–23)
BUN/CREAT SERPL: 25.5 (ref 7–25)
CALCIUM SPEC-SCNC: 9.9 MG/DL (ref 8.6–10.5)
CHLORIDE SERPL-SCNC: 100 MMOL/L (ref 98–107)
CHOLEST SERPL-MCNC: 126 MG/DL (ref 0–200)
CO2 SERPL-SCNC: 27 MMOL/L (ref 22–29)
CREAT SERPL-MCNC: 1.06 MG/DL (ref 0.76–1.27)
CREAT UR-MCNC: 60.3 MG/DL
EGFRCR SERPLBLD CKD-EPI 2021: 78.4 ML/MIN/1.73
GLOBULIN UR ELPH-MCNC: 2.6 GM/DL
GLUCOSE SERPL-MCNC: 98 MG/DL (ref 65–99)
HBA1C MFR BLD: 8.4 % (ref 3.5–5.6)
HDLC SERPL-MCNC: 29 MG/DL (ref 40–60)
LDLC SERPL CALC-MCNC: 72 MG/DL (ref 0–100)
LDLC/HDLC SERPL: 2.38 {RATIO}
MICROALBUMIN/CREAT UR: NORMAL MG/G{CREAT}
POTASSIUM SERPL-SCNC: 4.2 MMOL/L (ref 3.5–5.2)
PROT SERPL-MCNC: 7.3 G/DL (ref 6–8.5)
SODIUM SERPL-SCNC: 138 MMOL/L (ref 136–145)
TRIGL SERPL-MCNC: 140 MG/DL (ref 0–150)
TSH SERPL DL<=0.05 MIU/L-ACNC: 4.48 UIU/ML (ref 0.27–4.2)
VLDLC SERPL-MCNC: 25 MG/DL (ref 5–40)

## 2022-07-05 PROCEDURE — 84443 ASSAY THYROID STIM HORMONE: CPT

## 2022-07-05 PROCEDURE — 80061 LIPID PANEL: CPT

## 2022-07-05 PROCEDURE — 36415 COLL VENOUS BLD VENIPUNCTURE: CPT

## 2022-07-05 PROCEDURE — 80053 COMPREHEN METABOLIC PANEL: CPT

## 2022-07-05 PROCEDURE — 82043 UR ALBUMIN QUANTITATIVE: CPT

## 2022-07-05 PROCEDURE — 82570 ASSAY OF URINE CREATININE: CPT

## 2022-07-05 PROCEDURE — 83036 HEMOGLOBIN GLYCOSYLATED A1C: CPT

## 2022-07-12 ENCOUNTER — OFFICE VISIT (OUTPATIENT)
Dept: ENDOCRINOLOGY | Facility: CLINIC | Age: 67
End: 2022-07-12

## 2022-07-12 VITALS
HEART RATE: 76 BPM | WEIGHT: 205 LBS | DIASTOLIC BLOOD PRESSURE: 68 MMHG | HEIGHT: 65 IN | BODY MASS INDEX: 34.16 KG/M2 | SYSTOLIC BLOOD PRESSURE: 122 MMHG | OXYGEN SATURATION: 96 %

## 2022-07-12 DIAGNOSIS — Z79.4 TYPE 2 DIABETES MELLITUS WITH HYPERGLYCEMIA, WITH LONG-TERM CURRENT USE OF INSULIN: Primary | ICD-10-CM

## 2022-07-12 DIAGNOSIS — I10 ESSENTIAL HYPERTENSION: ICD-10-CM

## 2022-07-12 DIAGNOSIS — E78.2 MIXED HYPERLIPIDEMIA: ICD-10-CM

## 2022-07-12 DIAGNOSIS — R94.6 ABNORMAL THYROID FUNCTION TEST: ICD-10-CM

## 2022-07-12 DIAGNOSIS — E11.42 DIABETIC PERIPHERAL NEUROPATHY: ICD-10-CM

## 2022-07-12 DIAGNOSIS — E11.65 TYPE 2 DIABETES MELLITUS WITH HYPERGLYCEMIA, WITH LONG-TERM CURRENT USE OF INSULIN: Primary | ICD-10-CM

## 2022-07-12 LAB — GLUCOSE BLDC GLUCOMTR-MCNC: 239 MG/DL (ref 70–105)

## 2022-07-12 PROCEDURE — 82962 GLUCOSE BLOOD TEST: CPT | Performed by: INTERNAL MEDICINE

## 2022-07-12 PROCEDURE — 99214 OFFICE O/P EST MOD 30 MIN: CPT | Performed by: INTERNAL MEDICINE

## 2022-07-12 RX ORDER — SIMVASTATIN 40 MG
40 TABLET ORAL NIGHTLY
Qty: 90 TABLET | Refills: 4 | Status: SHIPPED | OUTPATIENT
Start: 2022-07-12 | End: 2023-02-28

## 2022-07-12 RX ORDER — METHOCARBAMOL 750 MG/1
750 TABLET, FILM COATED ORAL 3 TIMES DAILY
COMMUNITY
Start: 2022-05-29 | End: 2022-07-12

## 2022-07-12 RX ORDER — ICOSAPENT ETHYL 1000 MG/1
2 CAPSULE ORAL 2 TIMES DAILY WITH MEALS
Qty: 360 CAPSULE | Refills: 4 | Status: SHIPPED | OUTPATIENT
Start: 2022-07-12

## 2022-07-12 RX ORDER — INSULIN DEGLUDEC 200 U/ML
120 INJECTION, SOLUTION SUBCUTANEOUS DAILY
Qty: 66 PEN | Refills: 3 | Status: SHIPPED | OUTPATIENT
Start: 2022-07-12 | End: 2023-03-03

## 2022-07-12 RX ORDER — SEMAGLUTIDE 2.68 MG/ML
2 INJECTION, SOLUTION SUBCUTANEOUS WEEKLY
Qty: 9 ML | Refills: 4 | Status: SHIPPED | OUTPATIENT
Start: 2022-07-12 | End: 2023-02-28

## 2022-07-12 RX ORDER — ENALAPRIL MALEATE 20 MG/1
20 TABLET ORAL DAILY
Qty: 90 TABLET | Refills: 4 | Status: SHIPPED | OUTPATIENT
Start: 2022-07-12

## 2022-07-12 RX ORDER — DAPAGLIFLOZIN 10 MG/1
1 TABLET, FILM COATED ORAL DAILY
Qty: 90 TABLET | Refills: 4 | Status: SHIPPED | OUTPATIENT
Start: 2022-07-12

## 2022-07-12 NOTE — PROGRESS NOTES
Endocrine Progress Note Outpatient     Patient Care Team:  Caesar Limon MD as PCP - General (Family Medicine)    Chief Complaint: Follow-up type 2 diabetes    HPI: 66-year-old male with history of type 2 diabetes, hypertension, hyperlipidemia is here for follow-up.    For type 2 diabetes: He is currently on Farxiga 10 once a day and Tresiba 120 units subcu daily, Ozempic 1.0 mg weekly.  He is tolerating it well.  He is working on his diet. He had to stop Metformin due to GI side effects. He was on extended release Metformin. Denies low blood sugars.  He did have diabetes education back in [place] in . He tells me he has been having high blood sugars.     Hypertension: Well-controlled.    Hyperlipidemia: On simvastatin.    Diabetic peripheral neuropathy: On vitamin D and B12 supplementation.    Obesity: He is advised to work on diet.       Past Medical History:   Diagnosis Date   • Angina pectoris (HCC)    • Diabetes mellitus (HCC)     type 2   • Hyperlipidemia    • Hypertension    • Type 2 diabetes mellitus (HCC)        Social History     Socioeconomic History   • Marital status:      Spouse name: Odette   • Number of children: 0   • Years of education: 14   Tobacco Use   • Smoking status: Former Smoker     Quit date:      Years since quittin.5   • Smokeless tobacco: Never Used   Vaping Use   • Vaping Use: Never used   Substance and Sexual Activity   • Alcohol use: No   • Drug use: No   • Sexual activity: Defer       Family History   Problem Relation Age of Onset   • Cancer Father    • Diabetes Brother    • Hyperlipidemia Mother    • Diabetes Maternal Uncle        No Known Allergies    ROS:   Constitutional:  Denies fatigue, tiredness.    Eyes:  Denies change in visual acuity   HENT:  Denies nasal congestion or sore throat   Respiratory: denies cough, shortness of breath.   Cardiovascular:  denies chest pain, edema   GI:  Denies abdominal pain, nausea, vomiting.   Musculoskeletal:  Denies back  pain or joint pain   Integument:  Denies dry skin and rash   Neurologic:  Denies headache, focal weakness or sensory changes   Endocrine:  Denies polyuria or polydipsia   Psychiatric:  Denies depression or anxiety      Vitals:    07/12/22 0751   BP: 122/68   Pulse: 76   SpO2: 96%     BMI: 34.1    Physical Exam:  GEN: NAD, conversant, Obese  EYES: EOMI, PERRL, no conjunctival erythema  NECK: no thyromegaly, full ROM   CV: RRR, no murmurs/rubs/gallops, no peripheral edema  LUNG: CTAB, no wheezes/rales/ronchi  SKIN: no rashes, no acanthosis  MSK: no deformities, full ROM of all extremities  NEURO: no tremors, DTR normal  PSYCH: AOX3, appropriate mood, affect normal      Results Review:     I reviewed the patient's new clinical results.    Lab Results   Component Value Date    HGBA1C 8.4 (H) 07/05/2022    HGBA1C 6.4 (H) 09/20/2021    HGBA1C 6.8 (H) 03/15/2021      Lab Results   Component Value Date    GLUCOSE 98 07/05/2022    BUN 27 (H) 07/05/2022    CREATININE 1.06 07/05/2022    EGFRIFNONA 71 09/20/2021    BCR 25.5 (H) 07/05/2022    K 4.2 07/05/2022    CO2 27.0 07/05/2022    CALCIUM 9.9 07/05/2022    ALBUMIN 4.70 07/05/2022    LABIL2 1.4 03/14/2019    AST 55 (H) 07/05/2022    ALT 57 (H) 07/05/2022    CHOL 126 07/05/2022    TRIG 140 07/05/2022    LDL 72 07/05/2022    HDL 29 (L) 07/05/2022     Lab Results   Component Value Date    TSH 4.480 (H) 07/05/2022         Medication Review: Reviewed.       Current Outpatient Medications:   •  Cyanocobalamin (VITAMIN B-12) 1000 MCG sublingual tablet, Place 1,000 mcg under the tongue Daily., Disp: 100 each, Rfl: 4  •  Dapagliflozin Propanediol (Farxiga) 10 MG tablet, Take 10 mg by mouth Daily. Take 1 tablet (10mg) by mouth daily., Disp: 90 tablet, Rfl: 4  •  enalapril (VASOTEC) 20 MG tablet, Take 1 tablet by mouth Daily., Disp: 90 tablet, Rfl: 4  •  icosapent ethyl (VASCEPA) 1 g capsule capsule, Take 2 g by mouth 2 (Two) Times a Day With Meals., Disp: 360 capsule, Rfl: 4  •   Insulin Degludec (Tresiba FlexTouch) 200 UNIT/ML solution pen-injector pen injection, Inject 120 Units under the skin into the appropriate area as directed Daily., Disp: 66 pen, Rfl: 3  •  omeprazole (priLOSEC) 20 MG capsule, Take 1 capsule by mouth Daily. before a meal, Disp: 30 capsule, Rfl: 5  •  Semaglutide, 1 MG/DOSE, (Ozempic, 1 MG/DOSE,) 2 MG/1.5ML solution pen-injector, Inject 1 mg under the skin into the appropriate area as directed 1 (One) Time Per Week., Disp: 9 pen, Rfl: 4  •  simvastatin (ZOCOR) 40 MG tablet, Take 1 tablet by mouth Every Night., Disp: 90 tablet, Rfl: 4  •  tamsulosin (FLOMAX) 0.4 MG capsule 24 hr capsule, Take 1 capsule by mouth Daily., Disp: , Rfl:   •  topiramate (TOPAMAX) 100 MG tablet, Take 100 mg by mouth 2 (Two) Times a Day., Disp: , Rfl:       Assessment & Plan   1.  Diabetes mellitus type 2 with Hyperglycemia: Uncontrolled with A1c at 8.4%.  We discussed diet, he is doing fairly good diet.  He will try to improve it even better.  At this time we will increase Ozempic to 2 mg once a week and continue his Farxiga and Tresiba.  We will follow blood sugars and make further recommendations as needed.  Advised to always keep glucose source with him in case of low blood sugar and get regular eye exam and flu vaccine.    2.  Hypertension: Well-controlled, continue current medication    3.  Hyperlipidemia: Well-controlled, will follow lipid panel.      4.  Diabetic peripheral neuropathy: Improving.    5.  Abnormal thyroid test: Has mild high TSH, will recheck TSH with free T4 and TPO antibodies.    Assessment and plan from September 27, 2021 reviewed and updated.            Sheila Morales MD FACE.

## 2022-07-12 NOTE — PATIENT INSTRUCTIONS
Please increase Ozempic to 2 mg once a week  Please check your TSH with free T4 and TPO antibodies in next few days  Continue rest of the medication  Pay attention to your diet  Always keep glucose source in case of low blood sugar  Annual eye exam  Rest of the labs before follow-up in 6 months.

## 2022-07-21 ENCOUNTER — TELEPHONE (OUTPATIENT)
Dept: ENDOCRINOLOGY | Facility: CLINIC | Age: 67
End: 2022-07-21

## 2022-07-22 NOTE — TELEPHONE ENCOUNTER
Per Dr. Morales: He can take over-the-counter fish oil capsules, each capsule should be at least minimum 1000 mg and he needs to take 2 capsules twice a day.

## 2022-08-16 ENCOUNTER — HOSPITAL ENCOUNTER (OUTPATIENT)
Dept: HOSPITAL 83 - LAB | Age: 67
Discharge: HOME | End: 2022-08-16
Attending: INTERNAL MEDICINE
Payer: COMMERCIAL

## 2022-08-16 DIAGNOSIS — N17.0: Primary | ICD-10-CM

## 2022-08-16 LAB
BUN SERPL-MCNC: 21 MG/DL (ref 7–24)
CHLORIDE SERPL-SCNC: 110 MMOL/L (ref 98–107)
CREAT SERPL-MCNC: 1.2 MG/DL (ref 0.7–1.3)
POTASSIUM SERPL-SCNC: 4.6 MMOL/L (ref 3.5–5.1)
SODIUM SERPL-SCNC: 141 MMOL/L (ref 136–145)

## 2022-08-18 ENCOUNTER — HOSPITAL ENCOUNTER (OUTPATIENT)
Dept: HOSPITAL 83 - RESCLI | Age: 67
Discharge: HOME | End: 2022-08-18
Attending: STUDENT IN AN ORGANIZED HEALTH CARE EDUCATION/TRAINING PROGRAM
Payer: COMMERCIAL

## 2022-08-18 DIAGNOSIS — R41.81: ICD-10-CM

## 2022-08-18 DIAGNOSIS — R26.81: ICD-10-CM

## 2022-08-18 DIAGNOSIS — I12.9: Primary | ICD-10-CM

## 2022-08-18 DIAGNOSIS — K21.9: ICD-10-CM

## 2022-08-18 DIAGNOSIS — E03.9: ICD-10-CM

## 2022-08-18 DIAGNOSIS — Z79.899: ICD-10-CM

## 2022-08-18 DIAGNOSIS — Z88.8: ICD-10-CM

## 2022-08-18 DIAGNOSIS — E11.22: ICD-10-CM

## 2022-08-18 DIAGNOSIS — R60.9: ICD-10-CM

## 2022-08-18 DIAGNOSIS — F41.9: ICD-10-CM

## 2022-08-18 DIAGNOSIS — M62.838: ICD-10-CM

## 2022-08-18 DIAGNOSIS — Z91.09: ICD-10-CM

## 2022-08-18 DIAGNOSIS — Z79.82: ICD-10-CM

## 2022-08-18 DIAGNOSIS — E78.2: ICD-10-CM

## 2022-08-18 DIAGNOSIS — N18.30: ICD-10-CM

## 2022-08-23 ENCOUNTER — LAB (OUTPATIENT)
Dept: LAB | Facility: HOSPITAL | Age: 67
End: 2022-08-23

## 2022-08-23 DIAGNOSIS — R94.6 ABNORMAL THYROID FUNCTION TEST: ICD-10-CM

## 2022-08-23 DIAGNOSIS — Z79.4 TYPE 2 DIABETES MELLITUS WITH HYPERGLYCEMIA, WITH LONG-TERM CURRENT USE OF INSULIN: ICD-10-CM

## 2022-08-23 DIAGNOSIS — E11.65 TYPE 2 DIABETES MELLITUS WITH HYPERGLYCEMIA, WITH LONG-TERM CURRENT USE OF INSULIN: ICD-10-CM

## 2022-08-23 LAB — HBA1C MFR BLD: 7.2 % (ref 3.5–5.6)

## 2022-08-23 PROCEDURE — 36415 COLL VENOUS BLD VENIPUNCTURE: CPT

## 2022-08-23 PROCEDURE — 83036 HEMOGLOBIN GLYCOSYLATED A1C: CPT

## 2022-08-24 ENCOUNTER — HOSPITAL ENCOUNTER (OUTPATIENT)
Dept: HOSPITAL 83 - LAB | Age: 67
Discharge: HOME | End: 2022-08-24
Attending: INTERNAL MEDICINE
Payer: COMMERCIAL

## 2022-08-24 DIAGNOSIS — N18.30: ICD-10-CM

## 2022-08-24 DIAGNOSIS — E11.22: Primary | ICD-10-CM

## 2022-08-24 DIAGNOSIS — R60.9: ICD-10-CM

## 2022-08-24 LAB
BUN SERPL-MCNC: 26 MG/DL (ref 7–24)
CHLORIDE SERPL-SCNC: 110 MMOL/L (ref 98–107)
CREAT SERPL-MCNC: 1.29 MG/DL (ref 0.7–1.3)
POTASSIUM SERPL-SCNC: 4.1 MMOL/L (ref 3.5–5.1)
SODIUM SERPL-SCNC: 143 MMOL/L (ref 136–145)

## 2022-09-20 ENCOUNTER — HOSPITAL ENCOUNTER (OUTPATIENT)
Dept: HOSPITAL 83 - RESCLI | Age: 67
Discharge: HOME | End: 2022-09-20
Attending: INTERNAL MEDICINE
Payer: COMMERCIAL

## 2022-09-20 DIAGNOSIS — Z90.49: ICD-10-CM

## 2022-09-20 DIAGNOSIS — E78.2: ICD-10-CM

## 2022-09-20 DIAGNOSIS — R60.9: ICD-10-CM

## 2022-09-20 DIAGNOSIS — I10: Primary | ICD-10-CM

## 2022-09-20 DIAGNOSIS — Z98.890: ICD-10-CM

## 2022-09-20 DIAGNOSIS — Z79.899: ICD-10-CM

## 2022-09-20 DIAGNOSIS — Z88.8: ICD-10-CM

## 2022-09-20 DIAGNOSIS — R41.81: ICD-10-CM

## 2022-09-20 DIAGNOSIS — Z79.82: ICD-10-CM

## 2022-09-20 DIAGNOSIS — E11.9: ICD-10-CM

## 2022-09-20 DIAGNOSIS — M62.838: ICD-10-CM

## 2022-09-20 DIAGNOSIS — F41.9: ICD-10-CM

## 2022-09-20 DIAGNOSIS — M19.90: ICD-10-CM

## 2022-09-20 DIAGNOSIS — E03.9: ICD-10-CM

## 2022-12-29 ENCOUNTER — HOSPITAL ENCOUNTER (OUTPATIENT)
Dept: HOSPITAL 83 - RESCLI | Age: 67
Discharge: HOME | End: 2022-12-29
Attending: EMERGENCY MEDICINE
Payer: COMMERCIAL

## 2022-12-29 DIAGNOSIS — F41.9: ICD-10-CM

## 2022-12-29 DIAGNOSIS — E03.9: ICD-10-CM

## 2022-12-29 DIAGNOSIS — M62.838: ICD-10-CM

## 2022-12-29 DIAGNOSIS — R41.81: ICD-10-CM

## 2022-12-29 DIAGNOSIS — E55.9: ICD-10-CM

## 2022-12-29 DIAGNOSIS — R60.9: ICD-10-CM

## 2022-12-29 DIAGNOSIS — I10: ICD-10-CM

## 2022-12-29 DIAGNOSIS — K21.9: ICD-10-CM

## 2022-12-29 DIAGNOSIS — F32.4: ICD-10-CM

## 2022-12-29 DIAGNOSIS — M19.90: ICD-10-CM

## 2022-12-29 DIAGNOSIS — Z23: Primary | ICD-10-CM

## 2022-12-29 DIAGNOSIS — E11.9: ICD-10-CM

## 2022-12-29 DIAGNOSIS — E78.2: ICD-10-CM

## 2022-12-29 LAB
ALP SERPL-CCNC: 97 U/L (ref 46–116)
ALT SERPL W P-5'-P-CCNC: 12 U/L (ref 10–49)
BASOPHILS # BLD AUTO: 0 10*3/UL (ref 0–0.1)
BASOPHILS NFR BLD AUTO: 0.8 % (ref 0–1)
BUN SERPL-MCNC: 19 MG/DL (ref 9–23)
CHLORIDE SERPL-SCNC: 103 MMOL/L (ref 98–107)
CHOLEST SERPL-MCNC: 125 MG/DL (ref ?–200)
EOSINOPHIL # BLD AUTO: 0.2 10*3/UL (ref 0–0.4)
EOSINOPHIL # BLD AUTO: 4.4 % (ref 1–4)
ERYTHROCYTE [DISTWIDTH] IN BLOOD BY AUTOMATED COUNT: 13.2 % (ref 0–14.5)
HCT VFR BLD AUTO: 39 % (ref 42–52)
LDLC SERPL DIRECT ASSAY-MCNC: 56 MG/DL (ref 9–159)
LYMPHOCYTES # BLD AUTO: 0.7 10*3/UL (ref 1.3–4.4)
LYMPHOCYTES NFR BLD AUTO: 12.5 % (ref 27–41)
MCH RBC QN AUTO: 28.2 PG (ref 27–31)
MCHC RBC AUTO-ENTMCNC: 33.1 G/DL (ref 33–37)
MCV RBC AUTO: 85.3 FL (ref 80–94)
MONOCYTES # BLD AUTO: 0.5 10*3/UL (ref 0.1–1)
MONOCYTES NFR BLD MANUAL: 9.6 % (ref 3–9)
NEUT #: 3.8 10*3/UL (ref 2.3–7.9)
NEUT %: 72.5 % (ref 47–73)
NRBC BLD QL AUTO: 0 % (ref 0–0)
PLATELET # BLD AUTO: 218 10*3/UL (ref 130–400)
PMV BLD AUTO: 10.1 FL (ref 9.6–12.3)
POTASSIUM SERPL-SCNC: 4.4 MMOL/L (ref 3.4–5.1)
PROT SERPL-MCNC: 7.3 GM/DL (ref 6–8)
RBC # BLD AUTO: 4.57 10*6/UL (ref 4.5–5.9)
TRIGL SERPL-MCNC: 110 MG/DL (ref ?–150)
WBC NRBC COR # BLD AUTO: 5.2 10*3/UL (ref 4.8–10.8)

## 2023-01-13 ENCOUNTER — HOSPITAL ENCOUNTER (INPATIENT)
Age: 68
LOS: 4 days | Discharge: HOME | DRG: 445 | End: 2023-01-17
Attending: INTERNAL MEDICINE | Admitting: INTERNAL MEDICINE
Payer: MEDICARE

## 2023-01-13 ENCOUNTER — HOSPITAL ENCOUNTER (EMERGENCY)
Dept: HOSPITAL 83 - ED | Age: 68
Discharge: ANOTHER HOSPITAL | End: 2023-01-13
Payer: COMMERCIAL

## 2023-01-13 VITALS — HEIGHT: 74 IN | BODY MASS INDEX: 30.8 KG/M2 | WEIGHT: 240 LBS

## 2023-01-13 DIAGNOSIS — R11.2: Primary | ICD-10-CM

## 2023-01-13 DIAGNOSIS — Z98.890: ICD-10-CM

## 2023-01-13 DIAGNOSIS — Z88.6: ICD-10-CM

## 2023-01-13 DIAGNOSIS — R79.89: ICD-10-CM

## 2023-01-13 DIAGNOSIS — R11.2 INTRACTABLE NAUSEA AND VOMITING: Primary | ICD-10-CM

## 2023-01-13 DIAGNOSIS — Z79.899: ICD-10-CM

## 2023-01-13 LAB
ALP SERPL-CCNC: 346 U/L (ref 46–116)
ALT SERPL W P-5'-P-CCNC: 389 U/L (ref 10–49)
APTT PPP: 25.2 SECONDS (ref 20–32.1)
BACTERIA #/AREA URNS HPF: (no result) /[HPF]
BASOPHILS # BLD AUTO: 0 10*3/UL (ref 0–0.1)
BASOPHILS NFR BLD AUTO: 0.4 % (ref 0–1)
BILIRUB UR QL STRIP: (no result)
BUN SERPL-MCNC: 22 MG/DL (ref 9–23)
CASTS URNS QL MICRO: (no result)
CHLORIDE SERPL-SCNC: 97 MMOL/L (ref 98–107)
CHP ED QC CHECK: ABNORMAL
EOSINOPHIL # BLD AUTO: 0.3 10*3/UL (ref 0–0.4)
EOSINOPHIL # BLD AUTO: 5.5 % (ref 1–4)
ERYTHROCYTE [DISTWIDTH] IN BLOOD BY AUTOMATED COUNT: 13.2 % (ref 0–14.5)
GLUCOSE BLD-MCNC: 211 MG/DL
HCT VFR BLD AUTO: 35.8 % (ref 42–52)
INR BLD: 1.1 (ref 2–3.5)
LIPASE SERPL-CCNC: 46 U/L (ref 12–53)
LYMPHOCYTES # BLD AUTO: 0.3 10*3/UL (ref 1.3–4.4)
LYMPHOCYTES NFR BLD AUTO: 6.5 % (ref 27–41)
MCH RBC QN AUTO: 28 PG (ref 27–31)
MCHC RBC AUTO-ENTMCNC: 33.5 G/DL (ref 33–37)
MCV RBC AUTO: 83.4 FL (ref 80–94)
MONOCYTES # BLD AUTO: 0.6 10*3/UL (ref 0.1–1)
MONOCYTES NFR BLD MANUAL: 12 % (ref 3–9)
MUCOUS THREADS URNS QL MICRO: (no result)
NEUT #: 3.7 10*3/UL (ref 2.3–7.9)
NEUT %: 75.2 % (ref 47–73)
NRBC BLD QL AUTO: 0 % (ref 0–0)
PH UR STRIP: 5 [PH] (ref 4.5–8)
PLATELET # BLD AUTO: 178 10*3/UL (ref 130–400)
PMV BLD AUTO: 10 FL (ref 9.6–12.3)
POTASSIUM SERPL-SCNC: 3.2 MMOL/L (ref 3.4–5.1)
PROT SERPL-MCNC: 6.7 GM/DL (ref 6–8)
RBC # BLD AUTO: 4.29 10*6/UL (ref 4.5–5.9)
SP GR UR: 1.01 (ref 1–1.03)
UROBILINOGEN UR STRIP-MCNC: 2 E.U./DL (ref 0–1)
WBC #/AREA URNS HPF: (no result) WBC/HPF (ref 0–5)
WBC NRBC COR # BLD AUTO: 4.9 10*3/UL (ref 4.8–10.8)

## 2023-01-13 PROCEDURE — APPSS45 APP SPLIT SHARED TIME 31-45 MINUTES

## 2023-01-13 PROCEDURE — 1200000000 HC SEMI PRIVATE

## 2023-01-13 RX ORDER — CARVEDILOL 6.25 MG/1
3.12 TABLET ORAL 2 TIMES DAILY
COMMUNITY

## 2023-01-13 RX ORDER — SERTRALINE HYDROCHLORIDE 100 MG/1
100 TABLET, FILM COATED ORAL DAILY
COMMUNITY

## 2023-01-13 RX ORDER — ONDANSETRON 2 MG/ML
4 INJECTION INTRAMUSCULAR; INTRAVENOUS EVERY 6 HOURS PRN
Status: DISCONTINUED | OUTPATIENT
Start: 2023-01-13 | End: 2023-01-14

## 2023-01-13 RX ORDER — RISPERIDONE 0.25 MG/1
0.25 TABLET ORAL 2 TIMES DAILY
COMMUNITY

## 2023-01-13 RX ORDER — BACLOFEN 10 MG/1
10 TABLET ORAL 2 TIMES DAILY
COMMUNITY

## 2023-01-13 RX ORDER — FUROSEMIDE 20 MG/1
20 TABLET ORAL DAILY
COMMUNITY

## 2023-01-13 RX ORDER — LEVOTHYROXINE SODIUM 0.12 MG/1
125 TABLET ORAL DAILY
COMMUNITY

## 2023-01-13 RX ORDER — ACETAMINOPHEN 325 MG/1
650 TABLET ORAL EVERY 6 HOURS PRN
Status: DISCONTINUED | OUTPATIENT
Start: 2023-01-13 | End: 2023-01-17 | Stop reason: HOSPADM

## 2023-01-13 RX ORDER — SODIUM CHLORIDE 9 MG/ML
INJECTION, SOLUTION INTRAVENOUS CONTINUOUS
Status: DISCONTINUED | OUTPATIENT
Start: 2023-01-14 | End: 2023-01-15

## 2023-01-13 RX ORDER — POLYETHYLENE GLYCOL 3350 17 G/17G
17 POWDER, FOR SOLUTION ORAL DAILY PRN
Status: DISCONTINUED | OUTPATIENT
Start: 2023-01-13 | End: 2023-01-17 | Stop reason: HOSPADM

## 2023-01-13 RX ORDER — SODIUM CHLORIDE 0.9 % (FLUSH) 0.9 %
5-40 SYRINGE (ML) INJECTION PRN
Status: DISCONTINUED | OUTPATIENT
Start: 2023-01-13 | End: 2023-01-17 | Stop reason: HOSPADM

## 2023-01-13 RX ORDER — INSULIN ASPART 100 [IU]/ML
14 INJECTION, SOLUTION INTRAVENOUS; SUBCUTANEOUS 3 TIMES DAILY
COMMUNITY

## 2023-01-13 RX ORDER — SODIUM CHLORIDE 0.9 % (FLUSH) 0.9 %
5-40 SYRINGE (ML) INJECTION EVERY 12 HOURS SCHEDULED
Status: DISCONTINUED | OUTPATIENT
Start: 2023-01-14 | End: 2023-01-17 | Stop reason: HOSPADM

## 2023-01-13 RX ORDER — LISINOPRIL 2.5 MG/1
2.5 TABLET ORAL DAILY
COMMUNITY

## 2023-01-13 RX ORDER — INSULIN GLARGINE 100 [IU]/ML
20 INJECTION, SOLUTION SUBCUTANEOUS 2 TIMES DAILY
COMMUNITY

## 2023-01-13 RX ORDER — MEMANTINE HYDROCHLORIDE 10 MG/1
10 TABLET ORAL 2 TIMES DAILY
COMMUNITY

## 2023-01-13 RX ORDER — ASPIRIN 81 MG/1
81 TABLET ORAL DAILY
COMMUNITY

## 2023-01-13 RX ORDER — ONDANSETRON 4 MG/1
4 TABLET, ORALLY DISINTEGRATING ORAL EVERY 8 HOURS PRN
Status: DISCONTINUED | OUTPATIENT
Start: 2023-01-13 | End: 2023-01-14

## 2023-01-13 RX ORDER — ATORVASTATIN CALCIUM 20 MG/1
20 TABLET, FILM COATED ORAL DAILY
COMMUNITY

## 2023-01-13 RX ORDER — SODIUM CHLORIDE 9 MG/ML
INJECTION, SOLUTION INTRAVENOUS PRN
Status: DISCONTINUED | OUTPATIENT
Start: 2023-01-13 | End: 2023-01-17 | Stop reason: HOSPADM

## 2023-01-13 RX ORDER — ACETAMINOPHEN 650 MG/1
650 SUPPOSITORY RECTAL EVERY 6 HOURS PRN
Status: DISCONTINUED | OUTPATIENT
Start: 2023-01-13 | End: 2023-01-17 | Stop reason: HOSPADM

## 2023-01-13 ASSESSMENT — LIFESTYLE VARIABLES: HOW OFTEN DO YOU HAVE A DRINK CONTAINING ALCOHOL: NEVER

## 2023-01-13 ASSESSMENT — PAIN SCALES - GENERAL: PAINLEVEL_OUTOF10: 0

## 2023-01-14 ENCOUNTER — APPOINTMENT (OUTPATIENT)
Dept: ULTRASOUND IMAGING | Age: 68
DRG: 445 | End: 2023-01-14
Attending: INTERNAL MEDICINE
Payer: MEDICARE

## 2023-01-14 ENCOUNTER — APPOINTMENT (OUTPATIENT)
Dept: CT IMAGING | Age: 68
DRG: 445 | End: 2023-01-14
Attending: INTERNAL MEDICINE
Payer: MEDICARE

## 2023-01-14 LAB
ADENOVIRUS BY PCR: NOT DETECTED
ALBUMIN SERPL-MCNC: 3.2 G/DL (ref 3.5–5.2)
ALP BLD-CCNC: 364 U/L (ref 40–129)
ALT SERPL-CCNC: 261 U/L (ref 0–40)
ANION GAP SERPL CALCULATED.3IONS-SCNC: 13 MMOL/L (ref 7–16)
AST SERPL-CCNC: 118 U/L (ref 0–39)
BACTERIA: ABNORMAL /HPF
BASOPHILS ABSOLUTE: 0 E9/L (ref 0–0.2)
BASOPHILS RELATIVE PERCENT: 0 % (ref 0–2)
BILIRUB SERPL-MCNC: 3.6 MG/DL (ref 0–1.2)
BILIRUB SERPL-MCNC: 3.9 MG/DL (ref 0–1.2)
BILIRUBIN DIRECT: 2.6 MG/DL (ref 0–0.3)
BILIRUBIN URINE: ABNORMAL
BILIRUBIN, INDIRECT: 1 MG/DL (ref 0–1)
BLOOD, URINE: ABNORMAL
BORDETELLA PARAPERTUSSIS BY PCR: NOT DETECTED
BORDETELLA PERTUSSIS BY PCR: NOT DETECTED
BUN BLDV-MCNC: 22 MG/DL (ref 6–23)
BURR CELLS: ABNORMAL
CALCIUM SERPL-MCNC: 8.9 MG/DL (ref 8.6–10.2)
CHLAMYDOPHILIA PNEUMONIAE BY PCR: NOT DETECTED
CHLORIDE BLD-SCNC: 100 MMOL/L (ref 98–107)
CLARITY: CLEAR
CO2: 24 MMOL/L (ref 22–29)
COLOR: ABNORMAL
CORONAVIRUS 229E BY PCR: NOT DETECTED
CORONAVIRUS HKU1 BY PCR: NOT DETECTED
CORONAVIRUS NL63 BY PCR: NOT DETECTED
CORONAVIRUS OC43 BY PCR: NOT DETECTED
CREAT SERPL-MCNC: 1.3 MG/DL (ref 0.7–1.2)
EOSINOPHILS ABSOLUTE: 0.09 E9/L (ref 0.05–0.5)
EOSINOPHILS RELATIVE PERCENT: 1.7 % (ref 0–6)
EPITHELIAL CELLS, UA: ABNORMAL /HPF
GFR SERPL CREATININE-BSD FRML MDRD: >60 ML/MIN/1.73
GLUCOSE BLD-MCNC: 217 MG/DL (ref 74–99)
GLUCOSE URINE: 100 MG/DL
HBA1C MFR BLD: 8.3 % (ref 4–5.6)
HCT VFR BLD CALC: 32.6 % (ref 37–54)
HEMOGLOBIN: 10.7 G/DL (ref 12.5–16.5)
HUMAN METAPNEUMOVIRUS BY PCR: NOT DETECTED
HUMAN RHINOVIRUS/ENTEROVIRUS BY PCR: NOT DETECTED
INFLUENZA A BY PCR: NOT DETECTED
INFLUENZA B BY PCR: NOT DETECTED
KETONES, URINE: NEGATIVE MG/DL
LACTIC ACID: 0.7 MMOL/L (ref 0.5–2.2)
LEUKOCYTE ESTERASE, URINE: NEGATIVE
LIPASE: 43 U/L (ref 13–60)
LYMPHOCYTES ABSOLUTE: 0.35 E9/L (ref 1.5–4)
LYMPHOCYTES RELATIVE PERCENT: 7 % (ref 20–42)
MCH RBC QN AUTO: 27.8 PG (ref 26–35)
MCHC RBC AUTO-ENTMCNC: 32.8 % (ref 32–34.5)
MCV RBC AUTO: 84.7 FL (ref 80–99.9)
METER GLUCOSE: 205 MG/DL (ref 74–99)
METER GLUCOSE: 222 MG/DL (ref 74–99)
METER GLUCOSE: 265 MG/DL (ref 74–99)
METER GLUCOSE: 283 MG/DL (ref 74–99)
MONOCYTES ABSOLUTE: 0.45 E9/L (ref 0.1–0.95)
MONOCYTES RELATIVE PERCENT: 8.7 % (ref 2–12)
MYCOPLASMA PNEUMONIAE BY PCR: NOT DETECTED
NEUTROPHILS ABSOLUTE: 4.15 E9/L (ref 1.8–7.3)
NEUTROPHILS RELATIVE PERCENT: 82.6 % (ref 43–80)
NITRITE, URINE: NEGATIVE
NUCLEATED RED BLOOD CELLS: 0 /100 WBC
PARAINFLUENZA VIRUS 1 BY PCR: NOT DETECTED
PARAINFLUENZA VIRUS 2 BY PCR: NOT DETECTED
PARAINFLUENZA VIRUS 3 BY PCR: NOT DETECTED
PARAINFLUENZA VIRUS 4 BY PCR: NOT DETECTED
PDW BLD-RTO: 13.2 FL (ref 11.5–15)
PH UA: 5 (ref 5–9)
PLATELET # BLD: 172 E9/L (ref 130–450)
PMV BLD AUTO: 10.5 FL (ref 7–12)
POIKILOCYTES: ABNORMAL
POLYCHROMASIA: ABNORMAL
POTASSIUM REFLEX MAGNESIUM: 3.7 MMOL/L (ref 3.5–5)
PROCALCITONIN: 0.85 NG/ML (ref 0–0.08)
PROTEIN UA: ABNORMAL MG/DL
RBC # BLD: 3.85 E12/L (ref 3.8–5.8)
RBC UA: ABNORMAL /HPF (ref 0–2)
RESPIRATORY SYNCYTIAL VIRUS BY PCR: NOT DETECTED
SARS-COV-2, PCR: NOT DETECTED
SEDIMENTATION RATE, ERYTHROCYTE: 12 MM/HR (ref 0–15)
SODIUM BLD-SCNC: 137 MMOL/L (ref 132–146)
SPECIFIC GRAVITY UA: 1.02 (ref 1–1.03)
TEAR DROP CELLS: ABNORMAL
TOTAL PROTEIN: 6.1 G/DL (ref 6.4–8.3)
UROBILINOGEN, URINE: 2 E.U./DL
WBC # BLD: 5 E9/L (ref 4.5–11.5)
WBC UA: ABNORMAL /HPF (ref 0–5)

## 2023-01-14 PROCEDURE — 82962 GLUCOSE BLOOD TEST: CPT

## 2023-01-14 PROCEDURE — 1200000000 HC SEMI PRIVATE

## 2023-01-14 PROCEDURE — 2580000003 HC RX 258

## 2023-01-14 PROCEDURE — 6370000000 HC RX 637 (ALT 250 FOR IP): Performed by: INTERNAL MEDICINE

## 2023-01-14 PROCEDURE — 87088 URINE BACTERIA CULTURE: CPT

## 2023-01-14 PROCEDURE — 83690 ASSAY OF LIPASE: CPT

## 2023-01-14 PROCEDURE — 84145 PROCALCITONIN (PCT): CPT

## 2023-01-14 PROCEDURE — 81001 URINALYSIS AUTO W/SCOPE: CPT

## 2023-01-14 PROCEDURE — 82248 BILIRUBIN DIRECT: CPT

## 2023-01-14 PROCEDURE — 85651 RBC SED RATE NONAUTOMATED: CPT

## 2023-01-14 PROCEDURE — 36415 COLL VENOUS BLD VENIPUNCTURE: CPT

## 2023-01-14 PROCEDURE — 0202U NFCT DS 22 TRGT SARS-COV-2: CPT

## 2023-01-14 PROCEDURE — 83036 HEMOGLOBIN GLYCOSYLATED A1C: CPT

## 2023-01-14 PROCEDURE — 85025 COMPLETE CBC W/AUTO DIFF WBC: CPT

## 2023-01-14 PROCEDURE — 6360000002 HC RX W HCPCS

## 2023-01-14 PROCEDURE — 76705 ECHO EXAM OF ABDOMEN: CPT

## 2023-01-14 PROCEDURE — 82247 BILIRUBIN TOTAL: CPT

## 2023-01-14 PROCEDURE — C9113 INJ PANTOPRAZOLE SODIUM, VIA: HCPCS

## 2023-01-14 PROCEDURE — 83605 ASSAY OF LACTIC ACID: CPT

## 2023-01-14 PROCEDURE — 87040 BLOOD CULTURE FOR BACTERIA: CPT

## 2023-01-14 PROCEDURE — A4216 STERILE WATER/SALINE, 10 ML: HCPCS

## 2023-01-14 PROCEDURE — 74176 CT ABD & PELVIS W/O CONTRAST: CPT

## 2023-01-14 PROCEDURE — 80053 COMPREHEN METABOLIC PANEL: CPT

## 2023-01-14 PROCEDURE — 99233 SBSQ HOSP IP/OBS HIGH 50: CPT | Performed by: INTERNAL MEDICINE

## 2023-01-14 RX ORDER — FUROSEMIDE 20 MG/1
20 TABLET ORAL DAILY
Status: DISCONTINUED | OUTPATIENT
Start: 2023-01-14 | End: 2023-01-17 | Stop reason: HOSPADM

## 2023-01-14 RX ORDER — ASPIRIN 81 MG/1
81 TABLET ORAL DAILY
Status: DISCONTINUED | OUTPATIENT
Start: 2023-01-14 | End: 2023-01-17 | Stop reason: HOSPADM

## 2023-01-14 RX ORDER — SERTRALINE HYDROCHLORIDE 100 MG/1
100 TABLET, FILM COATED ORAL DAILY
Status: DISCONTINUED | OUTPATIENT
Start: 2023-01-14 | End: 2023-01-17 | Stop reason: HOSPADM

## 2023-01-14 RX ORDER — MEMANTINE HYDROCHLORIDE 10 MG/1
10 TABLET ORAL 2 TIMES DAILY
Status: DISCONTINUED | OUTPATIENT
Start: 2023-01-14 | End: 2023-01-17 | Stop reason: HOSPADM

## 2023-01-14 RX ORDER — INSULIN LISPRO 100 [IU]/ML
0-8 INJECTION, SOLUTION INTRAVENOUS; SUBCUTANEOUS EVERY 6 HOURS
Status: DISCONTINUED | OUTPATIENT
Start: 2023-01-14 | End: 2023-01-14

## 2023-01-14 RX ORDER — DEXTROSE MONOHYDRATE 100 MG/ML
INJECTION, SOLUTION INTRAVENOUS CONTINUOUS PRN
Status: DISCONTINUED | OUTPATIENT
Start: 2023-01-14 | End: 2023-01-14 | Stop reason: SDUPTHER

## 2023-01-14 RX ORDER — LISINOPRIL 2.5 MG/1
2.5 TABLET ORAL DAILY
Status: DISCONTINUED | OUTPATIENT
Start: 2023-01-14 | End: 2023-01-17 | Stop reason: HOSPADM

## 2023-01-14 RX ORDER — RISPERIDONE 0.25 MG/1
0.25 TABLET ORAL 2 TIMES DAILY
Status: DISCONTINUED | OUTPATIENT
Start: 2023-01-14 | End: 2023-01-17 | Stop reason: HOSPADM

## 2023-01-14 RX ORDER — DEXTROSE MONOHYDRATE 100 MG/ML
INJECTION, SOLUTION INTRAVENOUS CONTINUOUS PRN
Status: DISCONTINUED | OUTPATIENT
Start: 2023-01-14 | End: 2023-01-15 | Stop reason: SDUPTHER

## 2023-01-14 RX ORDER — CARVEDILOL 3.12 MG/1
3.12 TABLET ORAL 2 TIMES DAILY
Status: DISCONTINUED | OUTPATIENT
Start: 2023-01-14 | End: 2023-01-17 | Stop reason: HOSPADM

## 2023-01-14 RX ORDER — LEVOTHYROXINE SODIUM 0.12 MG/1
125 TABLET ORAL DAILY
Status: DISCONTINUED | OUTPATIENT
Start: 2023-01-14 | End: 2023-01-17 | Stop reason: HOSPADM

## 2023-01-14 RX ORDER — INSULIN LISPRO 100 [IU]/ML
0-16 INJECTION, SOLUTION INTRAVENOUS; SUBCUTANEOUS EVERY 4 HOURS
Status: DISCONTINUED | OUTPATIENT
Start: 2023-01-14 | End: 2023-01-15

## 2023-01-14 RX ORDER — ATORVASTATIN CALCIUM 20 MG/1
20 TABLET, FILM COATED ORAL DAILY
Status: DISCONTINUED | OUTPATIENT
Start: 2023-01-14 | End: 2023-01-17 | Stop reason: HOSPADM

## 2023-01-14 RX ORDER — BACLOFEN 10 MG/1
10 TABLET ORAL 2 TIMES DAILY
Status: DISCONTINUED | OUTPATIENT
Start: 2023-01-14 | End: 2023-01-17 | Stop reason: HOSPADM

## 2023-01-14 RX ADMIN — BACLOFEN 10 MG: 10 TABLET ORAL at 20:23

## 2023-01-14 RX ADMIN — INSULIN LISPRO 8 UNITS: 100 INJECTION, SOLUTION INTRAVENOUS; SUBCUTANEOUS at 22:04

## 2023-01-14 RX ADMIN — FUROSEMIDE 20 MG: 20 TABLET ORAL at 13:48

## 2023-01-14 RX ADMIN — ASPIRIN 81 MG: 81 TABLET, COATED ORAL at 13:49

## 2023-01-14 RX ADMIN — ATORVASTATIN CALCIUM 20 MG: 20 TABLET, FILM COATED ORAL at 20:23

## 2023-01-14 RX ADMIN — PIPERACILLIN AND TAZOBACTAM 3375 MG: 3; .375 INJECTION, POWDER, FOR SOLUTION INTRAVENOUS at 01:00

## 2023-01-14 RX ADMIN — SERTRALINE HYDROCHLORIDE 50 MG: 50 TABLET ORAL at 20:23

## 2023-01-14 RX ADMIN — SODIUM CHLORIDE: 9 INJECTION, SOLUTION INTRAVENOUS at 01:57

## 2023-01-14 RX ADMIN — PIPERACILLIN AND TAZOBACTAM 3375 MG: 3; .375 INJECTION, POWDER, FOR SOLUTION INTRAVENOUS at 16:38

## 2023-01-14 RX ADMIN — SERTRALINE 100 MG: 100 TABLET, FILM COATED ORAL at 13:49

## 2023-01-14 RX ADMIN — LEVOTHYROXINE SODIUM 125 MCG: 0.12 TABLET ORAL at 13:49

## 2023-01-14 RX ADMIN — LISINOPRIL 2.5 MG: 2.5 TABLET ORAL at 13:49

## 2023-01-14 RX ADMIN — SODIUM CHLORIDE 40 MG: 9 INJECTION, SOLUTION INTRAMUSCULAR; INTRAVENOUS; SUBCUTANEOUS at 08:13

## 2023-01-14 RX ADMIN — BACLOFEN 10 MG: 10 TABLET ORAL at 13:48

## 2023-01-14 RX ADMIN — RISPERIDONE 0.25 MG: 0.25 TABLET, FILM COATED ORAL at 20:24

## 2023-01-14 RX ADMIN — CARVEDILOL 3.12 MG: 3.12 TABLET, FILM COATED ORAL at 13:49

## 2023-01-14 RX ADMIN — INSULIN LISPRO 8 UNITS: 100 INJECTION, SOLUTION INTRAVENOUS; SUBCUTANEOUS at 15:07

## 2023-01-14 RX ADMIN — PIPERACILLIN AND TAZOBACTAM 3375 MG: 3; .375 INJECTION, POWDER, FOR SOLUTION INTRAVENOUS at 08:16

## 2023-01-14 RX ADMIN — SODIUM CHLORIDE: 9 INJECTION, SOLUTION INTRAVENOUS at 22:06

## 2023-01-14 RX ADMIN — CARVEDILOL 3.12 MG: 3.12 TABLET, FILM COATED ORAL at 20:23

## 2023-01-14 RX ADMIN — MEMANTINE HYDROCHLORIDE 10 MG: 10 TABLET, FILM COATED ORAL at 20:23

## 2023-01-14 RX ADMIN — MEMANTINE HYDROCHLORIDE 10 MG: 10 TABLET, FILM COATED ORAL at 13:49

## 2023-01-14 NOTE — PROGRESS NOTES
[facility] Progress Note    Admitting Date and Time: 1/13/2023  9:47 PM  Admit Dx: Intractable nausea and vomiting [R11.2]    Subjective:  Patient is being followed for Intractable nausea and vomiting [R11.2]     Patient has no complaints at this time    ROS: denies fever, chills, cp, sob, n/v, HA unless stated above.       pantoprazole (PROTONIX) 40 mg injection  40 mg IntraVENous Daily    piperacillin-tazobactam  3,375 mg IntraVENous Q8H    insulin lispro  0-8 Units SubCUTAneous Q6H    sodium chloride flush  5-40 mL IntraVENous 2 times per day     glucose, 4 tablet, PRN  dextrose bolus, 125 mL, PRN   Or  dextrose bolus, 250 mL, PRN  glucagon (rDNA), 1 mg, PRN  dextrose, , Continuous PRN  trimethobenzamide, 200 mg, Q6H PRN  sodium chloride flush, 5-40 mL, PRN  sodium chloride, , PRN  acetaminophen, 650 mg, Q6H PRN   Or  acetaminophen, 650 mg, Q6H PRN  polyethylene glycol, 17 g, Daily PRN         Objective:    BP (!) 152/84   Pulse 78   Temp 98.6 °F (37 °C) (Oral)   Resp 18   Ht 6' 2\" (1.88 m)   Wt 231 lb (104.8 kg)   SpO2 93%   BMI 29.66 kg/m²     General Appearance: alert and oriented to person, place and time and in no acute distress  Skin: warm and dry  Head: normocephalic and atraumatic  Eyes: pupils equal, round, and reactive to light, extraocular eye movements intact, conjunctivae normal  Neck: neck supple and non tender without mass   Pulmonary/Chest: clear to auscultation bilaterally- no wheezes, rales or rhonchi, normal air movement, no respiratory distress  Cardiovascular: normal rate, normal S1 and S2 and no carotid bruits  Abdomen: soft, non-tender, non-distended, normal bowel sounds, no masses or organomegaly  Extremities: no cyanosis, no clubbing and no edema  Neurologic: no cranial nerve deficit and speech normal        Recent Labs     01/13/23  2245 01/14/23  0140   NA  --  137   K  --  3.7   CL  --  100   CO2  --  24   BUN  --  22   CREATININE  --  1.3*   GLUCOSE 211 217* CALCIUM  --  8.9       Recent Labs     01/14/23  0140   WBC 5.0   RBC 3.85   HGB 10.7*   HCT 32.6*   MCV 84.7   MCH 27.8   MCHC 32.8   RDW 13.2      MPV 10.5         Assessment:    Principal Problem:    Intractable nausea and vomiting  Active Problems:    Diabetes mellitus ([facility] [address]    Hypertension    Depression    Hypothyroidism  Resolved Problems:    * No resolved hospital problems. *      Plan:    Acute transaminitis - Currently NPO, on NS @ 100, and zosyn. GI on board. Abdominal US results are pending. BCx are pending. Consider CT abd/pelvis. Non oliguric ROCKY - Hold nephrotoxins. Continue NS. Will check labs in the AM  Hematuria - Hold anticoagulation. Will consult urology. DM-II - A1c is 8.3. Patient is on lantus 20 u BID and lispro 14 u TID. Currently on high dose insulin q4. Hypothyroidism - Hold synthroid  HTN - Hold coreg and lisinopril  HLD - Hold lipitor  Depression - Hold zoloft  DVT prophylaxis - SCD    NOTE: This report was transcribed using voice recognition software. Every effort was made to ensure accuracy; however, inadvertent computerized transcription errors may be present.     Electronically signed by Brissa Schulte DO on 1/14/2023 at 9:53 AM

## 2023-01-14 NOTE — H&P
[facility] History and Physical      CHIEF COMPLAINT:  nausea/ vomiting    History of Present Illness: This is a 74year old male with a past medical history of cholecystectomy 6 years ago, hypothyroidism, hypertension, and diabetes mellitus who presented to the ED with nausea and vomiting. States he bagan having nausea and vomiting 4 days ago. Has about 3 episodes of emesis daily, which he states looks like what he has eaten or clear mucous. Unable to tolerate PO intake. Denies abdominal pain, flank pain, or tenderness, but states he does have intermittant burning in his upper abdomen, which he states feels like heartburn. Has had hot and cold flashes for 3 days, although he did not take his temperature. Denies cough or congestion. Denies diarrhea or constipation. States his urine has been dark and pink; denies urinary symptoms of dysuria. Does complain of weak stream and frequency. States he had his gallbladder removed 6 years ago. States he had a liver abscess several years ago requiring intervention, which was done in [place]. States he was not aware until today that he had a stent in his common bile duct. While in [facility] ED, CT scan of abdomen showed a stent in the common bile duct with mild pneumobilia. Patient received a dose of Rocephin, 2 liters NS, and 2 doses of Zofran. Informant(s) for H&P: patient and chart review    REVIEW OF SYSTEMS:  A comprehensive review of systems was negative except for: what is in the HPI      PMH:  Past Medical History:   Diagnosis Date    COPD (chronic obstructive pulmonary disease) ([facility] [address]     Depression     Diabetes mellitus ([facility] [address]     Hyperlipidemia     Hypertension     Thyroid disease        Surgical History:  Past Surgical History:   Procedure Laterality Date    JOINT REPLACEMENT         Medications Prior to Admission:    Prior to Admission medications    Medication Sig Start Date End Date Taking?  Authorizing Provider   risperiDONE (RISPERDAL) 0.25 MG tablet Take 0.25 mg by mouth 2 times daily   Yes Historical Provider, MD   baclofen (LIORESAL) 10 MG tablet Take 10 mg by mouth 2 times daily One PO Q AM - Two PO Q HS   Yes Historical Provider, MD   carvedilol (COREG) 6.25 MG tablet Take 3.125 mg by mouth 2 times daily   Yes Historical Provider, MD   levothyroxine (SYNTHROID) 125 MCG tablet Take 125 mcg by mouth Daily   Yes Historical Provider, MD   lisinopril (PRINIVIL;ZESTRIL) 2.5 MG tablet Take 2.5 mg by mouth daily   Yes Historical Provider, MD   memantine (NAMENDA) 10 MG tablet Take 10 mg by mouth 2 times daily   Yes Historical Provider, MD   furosemide (LASIX) 20 MG tablet Take 20 mg by mouth daily   Yes Historical Provider, MD   sertraline (ZOLOFT) 100 MG tablet Take 100 mg by mouth daily One PO Q AM   Yes Historical Provider, MD   sertraline (ZOLOFT) 50 MG tablet Take 50 mg by mouth daily One PO Q HS   Yes Historical Provider, MD   atorvastatin (LIPITOR) 20 MG tablet Take 20 mg by mouth daily   Yes Historical Provider, MD   aspirin 81 MG EC tablet Take 81 mg by mouth daily   Yes Historical Provider, MD   insulin aspart (NOVOLOG) 100 UNIT/ML injection vial Inject 14 Units into the skin 3 times daily   Yes Historical Provider, MD   insulin glargine (LANTUS) 100 UNIT/ML injection vial Inject 20 Units into the skin 2 times daily   Yes Historical Provider, MD       Allergies:    Adhesive tape and Hydrocodone    Social History:    reports that he has never smoked. He has never been exposed to tobacco smoke. He has never used smokeless tobacco. He reports that he does not drink alcohol and does not use drugs. Family History:   family history includes Cancer in his father; Diabetes in his maternal grandfather, maternal grandmother, paternal grandfather, and paternal grandmother.        PHYSICAL EXAM:  Vitals:  BP (!) 158/71   Pulse 87   Temp 98.7 °F (37.1 °C) (Oral)   Resp 18   SpO2 98%     General Appearance: alert and oriented to person, place and time and in no acute distress  Skin: warm and dry  Head: normocephalic and atraumatic  Eyes: pupils equal, round, and reactive to light, conjunctivae normal  Pulmonary/Chest: clear to auscultation bilaterally- no wheezes, rales or rhonchi, normal air movement, no respiratory distress  Cardiovascular: normal rate, normal S1 and S2  Abdomen: soft, non-tender, non-distended, normal bowel sounds, no masses or organomegaly  Extremities: no cyanosis, no clubbing and trace BLE edema  Neurologic: speech normal        LABS:  Recent Labs     01/13/23  2245   GLUCOSE 211       No results for input(s): WBC, RBC, HGB, HCT, MCV, MCH, MCHC, RDW, PLT, MPV in the last 72 hours. No results for input(s): POCGLU in the last 72 hours. Radiology:   US GALLBLADDER RUQ    (Results Pending)   CT of the abdomen and pelvis: The lung bases have mild right lower lobe interstitial opacities. The liver, pancrease, adrenal glands, and kidneys are suboptimally evaluated on these unenhanced images, but demonstrate no acute pathology. The gallbladder is surgically absent. There is mild pneumobilia. The common bile duct stent is in place. There is a nonobstructing 5 mm calcified stone within the left kidney midpole calyx. The spleen is enlarged measuring 17 cm in AP dimension. There is no free air or lymph node enlargement. Abdominal aorta is not aneurysmal.  There are several diverticula of the colon without thickening. There is no bowel wall thickening or obstruction. There is no free fluid. Lymph nodes are not enlarged. Urinary bladder is unremarkable. There are no acute fractures. No suspicious bony lesions. EKG: not available    ASSESSMENT:      Principal Problem:    Intractable nausea and vomiting  Active Problems:    Diabetes mellitus ([facility] [address]    Hypertension    Depression    Hypothyroidism  Resolved Problems:    * No resolved hospital problems. *      PLAN:    1.  Intractable nausea and vomiting: CT scan at [facility] positive for biliary stent in common bile duct. Mild pneumobilia. LFTs elevated: Alk phos 364, , , bilirubin 3.9. Denies abdominal pain. GI consulted. Start Zosyn 3.375 every 6 hours. Check US of right upper quadrant. Zofran PRN nausea. I&Os. NPO.    2. ROCKY: No prior results for comparison. No history of CKD. BUN/ creatinine 22/ 1.39. Continue IVF and follow BMP. Avoid nephrotoxins. I&Os. 3. Hypertension: Hold lisinopril and Lasix. 4. Hypothyroid: Synthroid. 6. Diabetes mellitus: Check blood sugars every 6 hours. Sliding scale insulin coverage. Hypoglycemic protocol. 7. Depression: Zoloft. 8. Hyperlipidemia: Statin. 9. Hematuria: Check UA and urine culture. [facility] at [facility] negative for blood. Check postvoid residual for possible retention. 10. Hypokalemia: Potassium 3.2. Recheck BMP and replete accordingly. Code Status: full  DVT prophylaxis: SCDs-bleeding risk    35 minutes or more spent reviewing patient chart, assessing patient, discussing plan of care with patient and family, discussing plan of care with collaborating physician, and documentation. NOTE: This report was transcribed using voice recognition software. Every effort was made to ensure accuracy; however, inadvertent computerized transcription errors may be present.   Electronically signed by MARCUS Riggs CNP on 1/14/2023 at 12:33 AM

## 2023-01-14 NOTE — CONSULTS
Gastroenterology Consult Note   Brooke VELAZQUEZ, NP-C with Srikanth Crespo M.D. Consult Note        Date of Service: 1/14/2023  Reason for Consult: intractable N/V, pneumobilia on CT scan, stent in common bile duct  Requesting Physician: Roseann Walton, MARCUS - CNP    CHIEF COMPLAINT:  nausea with vomiting x 3 days    History Obtained From:  patient, electronic medical record    HISTORY OF PRESENT ILLNESS:       Milo Nuñez is a 74 y.o. male with significant past medical history of COPD, depression, DM, HLD, HTN, and thyroid disease admitted via ED for Transaminitis, and hyperbilirubinemia, and CBD stent. Pt reports to nausea with vomiting ongoing for the last several weeks. Thought intially it was related to a stomach virus as his wife & granddaughter were sick at the same time. States the N/V over the last 3 days has been continuous. Anytime he would try to eat or drink, he would vomit immediately. Described as \"food, liquids\". Denies any blood/black appearance. Denies any weight loss. Tried Pepto OTC, unable to keep down. With fever & chills at home. Epigastric pain described as \"burning\". Denies any radiation of the pain. Bowels move daily to every other day, soft brown stool. Reports to a liver abscess \"infection\" in [facility] hospital 8-10 years ago. States he was transferred to [facility]. at that time for continued hospitalization. Unable to elaborate any further. Per Care Everywhere patient had a Klebsiella liver abscess. Treated with IV antibiotics and a PTC drain. Uncertain of scope history. States he had a colon 6 years ago that was \"good, normal\". Colon 1/10/2014: Diverticulosis of the sigmoid colon and descending colonPolyp in the ascending colon (polypectomy)Lipoma in the proximal ascending colon. Additional notes: No evidence of diverticulitis seen. The polyp is small and unlikely to be associated with his Klensiella pneumonia induced liver abscess. Denies any FMHx of colon cancer.  Consultation for intractable N/V, pneumobilia on CT scan, stent in common bile duct. Currently, pt reports to feeling better. Requesting water. With slight epigastric pain. Denies any further emesis.   Labs today creat 1.3, , protein 6.1, albumin 3.2, , , , bili 3.9, A1C 8.3, H&H 10.7 & 32.6. US pending    Past Medical History:        Diagnosis Date    COPD (chronic obstructive pulmonary disease) (HCC)     Depression     Diabetes mellitus ([facility] [address]     Hyperlipidemia     Hypertension     Thyroid disease      Past Surgical History:        Procedure Laterality Date    CARDIAC CATHETERIZATION  1988    HERNIA REPAIR      TOTAL KNEE ARTHROPLASTY Right 10/2022     Current Medications:    Current Facility-Administered Medications: pantoprazole (PROTONIX) 40 mg in sodium chloride (PF) 0.9 % 10 mL injection, 40 mg, IntraVENous, Daily  piperacillin-tazobactam (ZOSYN) 3,375 mg in dextrose 5 % 50 mL IVPB extended infusion (mini-bag), 3,375 mg, IntraVENous, Q8H  trimethobenzamide (TIGAN) injection 200 mg, 200 mg, IntraMUSCular, Q6H PRN  glucose chewable tablet 16 g, 4 tablet, Oral, PRN  dextrose bolus 10% 125 mL, 125 mL, IntraVENous, PRN **OR** dextrose bolus 10% 250 mL, 250 mL, IntraVENous, PRN  glucagon (rDNA) injection 1 mg, 1 mg, SubCUTAneous, PRN  dextrose 10 % infusion, , IntraVENous, Continuous PRN  insulin lispro (HUMALOG) injection vial 0-16 Units, 0-16 Units, SubCUTAneous, Q4H  sodium chloride flush 0.9 % injection 5-40 mL, 5-40 mL, IntraVENous, 2 times per day  sodium chloride flush 0.9 % injection 5-40 mL, 5-40 mL, IntraVENous, PRN  0.9 % sodium chloride infusion, , IntraVENous, PRN  acetaminophen (TYLENOL) tablet 650 mg, 650 mg, Oral, Q6H PRN **OR** acetaminophen (TYLENOL) suppository 650 mg, 650 mg, Rectal, Q6H PRN  polyethylene glycol (GLYCOLAX) packet 17 g, 17 g, Oral, Daily PRN  0.9 % sodium chloride infusion, , IntraVENous, Continuous    Allergies:  Adhesive tape and Hydrocodone    Patient denies any history of smoking or drug use. States he used to drink regularly when he was younger. Nothing for years. Family History:   Family History   Problem Relation Age of Onset    Cancer Father     Diabetes Maternal Grandmother     Diabetes Maternal Grandfather     Diabetes Paternal Grandmother     Diabetes Paternal Grandfather          REVIEW OF SYSTEMS:    Aside from what was mentioned in the [address] and HPI, essentially unremarkable, all others negative. PHYSICAL EXAM:      Vitals:    BP (!) 152/84   Pulse 78   Temp 98.6 °F (37 °C) (Oral)   Resp 18   Ht 6' 2\" (1.88 m)   Wt 231 lb (104.8 kg)   SpO2 93%   BMI 29.66 kg/m²       CONSTITUTIONAL:  awake, alert, cooperative, no apparent distress, and appears older than stated age  EYES:  pupils equal, round and reactive to light, sclera icteric and conjunctiva normal  ENT:  normocephalic, oral pharynx with moist mucous membranes  LUNGS:  clear to auscultation bilaterally.   CARDIOVASCULAR:  regular rate and rhythm, no murmur noted; 2+ pulses;  edema  ABDOMEN:  No scars, normal bowel sounds, soft, non-distended, epigastric tenderness to palpitation, no masses palpated  NEUROLOGIC:  Mental Status Exam:  Level of Alertness:   awake  Orientation:   person, place, time  SKIN:  slightly jaundiced, texture, turgor    DATA:    CBC with Differential:    Lab Results   Component Value Date/Time    WBC 5.0 01/14/2023 01:40 AM    RBC 3.85 01/14/2023 01:40 AM    HGB 10.7 01/14/2023 01:40 AM    HCT 32.6 01/14/2023 01:40 AM     01/14/2023 01:40 AM    MCV 84.7 01/14/2023 01:40 AM    MCH 27.8 01/14/2023 01:40 AM    MCHC 32.8 01/14/2023 01:40 AM    RDW 13.2 01/14/2023 01:40 AM    NRBC 0.0 01/14/2023 01:40 AM    LYMPHOPCT 7.0 01/14/2023 01:40 AM    MONOPCT 8.7 01/14/2023 01:40 AM    BASOPCT 0.0 01/14/2023 01:40 AM    MONOSABS 0.45 01/14/2023 01:40 AM    LYMPHSABS 0.35 01/14/2023 01:40 AM    EOSABS 0.09 01/14/2023 01:40 AM    BASOSABS 0.00 01/14/2023 01:40 AM     CMP:    Lab Results   Component Value Date/Time     01/14/2023 01:40 AM    K 3.7 01/14/2023 01:40 AM     01/14/2023 01:40 AM    CO2 24 01/14/2023 01:40 AM    BUN 22 01/14/2023 01:40 AM    CREATININE 1.3 01/14/2023 01:40 AM    LABGLOM >60 01/14/2023 01:40 AM    GLUCOSE 217 01/14/2023 01:40 AM    PROT 6.1 01/14/2023 01:40 AM    LABALBU 3.2 01/14/2023 01:40 AM    CALCIUM 8.9 01/14/2023 01:40 AM    BILITOT 3.9 01/14/2023 01:40 AM    ALKPHOS 364 01/14/2023 01:40 AM     01/14/2023 01:40 AM     01/14/2023 01:40 AM     Hepatic Function Panel:    Lab Results   Component Value Date/Time    ALKPHOS 364 01/14/2023 01:40 AM     01/14/2023 01:40 AM     01/14/2023 01:40 AM    PROT 6.1 01/14/2023 01:40 AM    BILITOT 3.9 01/14/2023 01:40 AM    LABALBU 3.2 01/14/2023 01:40 AM         IMPRESSION:    CBD stent- per CT completed at [facility]  Nausea with vomiting  Jaundiced  Elevated LFTs  Epigastric pain  Anemia, normocytic   ROCKY  HX of liver abscess 2014  Colon polyps in 2014    RECOMMENDATIONS:      [address] pending  CT W contrast once kidney function improves  Bili total & direct  Lipase now  Supportive care  OK for clears  Trend labs  IVF and pain management per Primary care  Continue to medicate for nausea as ordered  Continue Protonix as ordered  Further treatment plan pending imaging  Will follow    Thank you very much for your consultation. We will follow closely with you.     Discussed with Dr. Lisha Sheridan developed by Dr. Enrico Ya, NP-C 1/14/2023 10:00 AM for Dr. Easton

## 2023-01-14 NOTE — PROGRESS NOTES
Checked patient's sugar via his CGM - resulted at 211.     Electronically signed by Carlton Bailey RN on 1/13/2023 at 11:09 PM

## 2023-01-15 ENCOUNTER — ANESTHESIA EVENT (OUTPATIENT)
Dept: ENDOSCOPY | Age: 68
DRG: 445 | End: 2023-01-15
Payer: MEDICARE

## 2023-01-15 ENCOUNTER — ANESTHESIA (OUTPATIENT)
Dept: ENDOSCOPY | Age: 68
DRG: 445 | End: 2023-01-15
Payer: MEDICARE

## 2023-01-15 LAB
ALBUMIN SERPL-MCNC: 3.3 G/DL (ref 3.5–5.2)
ALP BLD-CCNC: 434 U/L (ref 40–129)
ALT SERPL-CCNC: 192 U/L (ref 0–40)
ANION GAP SERPL CALCULATED.3IONS-SCNC: 11 MMOL/L (ref 7–16)
AST SERPL-CCNC: 69 U/L (ref 0–39)
BILIRUB SERPL-MCNC: 3.1 MG/DL (ref 0–1.2)
BUN BLDV-MCNC: 16 MG/DL (ref 6–23)
CALCIUM SERPL-MCNC: 8.9 MG/DL (ref 8.6–10.2)
CHLORIDE BLD-SCNC: 101 MMOL/L (ref 98–107)
CO2: 24 MMOL/L (ref 22–29)
CREAT SERPL-MCNC: 1.3 MG/DL (ref 0.7–1.2)
GFR SERPL CREATININE-BSD FRML MDRD: >60 ML/MIN/1.73
GLUCOSE BLD-MCNC: 206 MG/DL (ref 74–99)
HCT VFR BLD CALC: 31.6 % (ref 37–54)
HEMOGLOBIN: 10.5 G/DL (ref 12.5–16.5)
MCH RBC QN AUTO: 27.6 PG (ref 26–35)
MCHC RBC AUTO-ENTMCNC: 33.2 % (ref 32–34.5)
MCV RBC AUTO: 82.9 FL (ref 80–99.9)
METER GLUCOSE: 176 MG/DL (ref 74–99)
METER GLUCOSE: 192 MG/DL (ref 74–99)
METER GLUCOSE: 197 MG/DL (ref 74–99)
METER GLUCOSE: 327 MG/DL (ref 74–99)
METER GLUCOSE: 330 MG/DL (ref 74–99)
PDW BLD-RTO: 13.2 FL (ref 11.5–15)
PLATELET # BLD: 167 E9/L (ref 130–450)
PMV BLD AUTO: 10.4 FL (ref 7–12)
POTASSIUM SERPL-SCNC: 3.6 MMOL/L (ref 3.5–5)
RBC # BLD: 3.81 E12/L (ref 3.8–5.8)
SODIUM BLD-SCNC: 136 MMOL/L (ref 132–146)
TOTAL PROTEIN: 6.3 G/DL (ref 6.4–8.3)
WBC # BLD: 3.5 E9/L (ref 4.5–11.5)

## 2023-01-15 PROCEDURE — 2580000003 HC RX 258

## 2023-01-15 PROCEDURE — 82962 GLUCOSE BLOOD TEST: CPT

## 2023-01-15 PROCEDURE — 6370000000 HC RX 637 (ALT 250 FOR IP): Performed by: INTERNAL MEDICINE

## 2023-01-15 PROCEDURE — 85027 COMPLETE CBC AUTOMATED: CPT

## 2023-01-15 PROCEDURE — 36415 COLL VENOUS BLD VENIPUNCTURE: CPT

## 2023-01-15 PROCEDURE — 6360000002 HC RX W HCPCS

## 2023-01-15 PROCEDURE — A4216 STERILE WATER/SALINE, 10 ML: HCPCS

## 2023-01-15 PROCEDURE — C9113 INJ PANTOPRAZOLE SODIUM, VIA: HCPCS

## 2023-01-15 PROCEDURE — 99233 SBSQ HOSP IP/OBS HIGH 50: CPT | Performed by: INTERNAL MEDICINE

## 2023-01-15 PROCEDURE — 80053 COMPREHEN METABOLIC PANEL: CPT

## 2023-01-15 PROCEDURE — 88112 CYTOPATH CELL ENHANCE TECH: CPT

## 2023-01-15 PROCEDURE — 1200000000 HC SEMI PRIVATE

## 2023-01-15 RX ORDER — INSULIN LISPRO 100 [IU]/ML
14 INJECTION, SOLUTION INTRAVENOUS; SUBCUTANEOUS
Status: DISCONTINUED | OUTPATIENT
Start: 2023-01-15 | End: 2023-01-16

## 2023-01-15 RX ORDER — INSULIN LISPRO 100 [IU]/ML
0-4 INJECTION, SOLUTION INTRAVENOUS; SUBCUTANEOUS NIGHTLY
Status: DISCONTINUED | OUTPATIENT
Start: 2023-01-15 | End: 2023-01-16

## 2023-01-15 RX ORDER — DEXTROSE MONOHYDRATE 100 MG/ML
INJECTION, SOLUTION INTRAVENOUS CONTINUOUS PRN
Status: DISCONTINUED | OUTPATIENT
Start: 2023-01-15 | End: 2023-01-15 | Stop reason: SDUPTHER

## 2023-01-15 RX ORDER — DEXTROSE MONOHYDRATE 100 MG/ML
INJECTION, SOLUTION INTRAVENOUS CONTINUOUS PRN
Status: DISCONTINUED | OUTPATIENT
Start: 2023-01-15 | End: 2023-01-17 | Stop reason: HOSPADM

## 2023-01-15 RX ORDER — SODIUM CHLORIDE, SODIUM LACTATE, POTASSIUM CHLORIDE, AND CALCIUM CHLORIDE .6; .31; .03; .02 G/100ML; G/100ML; G/100ML; G/100ML
1000 INJECTION, SOLUTION INTRAVENOUS ONCE
Status: COMPLETED | OUTPATIENT
Start: 2023-01-16 | End: 2023-01-16

## 2023-01-15 RX ORDER — INSULIN LISPRO 100 [IU]/ML
0-4 INJECTION, SOLUTION INTRAVENOUS; SUBCUTANEOUS
Status: DISCONTINUED | OUTPATIENT
Start: 2023-01-15 | End: 2023-01-16

## 2023-01-15 RX ADMIN — LEVOTHYROXINE SODIUM 125 MCG: 0.12 TABLET ORAL at 05:52

## 2023-01-15 RX ADMIN — SODIUM CHLORIDE 40 MG: 9 INJECTION, SOLUTION INTRAMUSCULAR; INTRAVENOUS; SUBCUTANEOUS at 05:52

## 2023-01-15 RX ADMIN — BACLOFEN 10 MG: 10 TABLET ORAL at 09:40

## 2023-01-15 RX ADMIN — SODIUM CHLORIDE, PRESERVATIVE FREE 10 ML: 5 INJECTION INTRAVENOUS at 10:14

## 2023-01-15 RX ADMIN — SERTRALINE HYDROCHLORIDE 50 MG: 50 TABLET ORAL at 20:24

## 2023-01-15 RX ADMIN — INSULIN LISPRO 3 UNITS: 100 INJECTION, SOLUTION INTRAVENOUS; SUBCUTANEOUS at 11:10

## 2023-01-15 RX ADMIN — LISINOPRIL 2.5 MG: 2.5 TABLET ORAL at 10:18

## 2023-01-15 RX ADMIN — RISPERIDONE 0.25 MG: 0.25 TABLET, FILM COATED ORAL at 20:24

## 2023-01-15 RX ADMIN — RISPERIDONE 0.25 MG: 0.25 TABLET, FILM COATED ORAL at 09:40

## 2023-01-15 RX ADMIN — INSULIN LISPRO 14 UNITS: 100 INJECTION, SOLUTION INTRAVENOUS; SUBCUTANEOUS at 16:35

## 2023-01-15 RX ADMIN — MEMANTINE HYDROCHLORIDE 10 MG: 10 TABLET, FILM COATED ORAL at 20:24

## 2023-01-15 RX ADMIN — ATORVASTATIN CALCIUM 20 MG: 20 TABLET, FILM COATED ORAL at 20:24

## 2023-01-15 RX ADMIN — CARVEDILOL 3.12 MG: 3.12 TABLET, FILM COATED ORAL at 20:24

## 2023-01-15 RX ADMIN — SERTRALINE 100 MG: 100 TABLET, FILM COATED ORAL at 10:14

## 2023-01-15 RX ADMIN — BACLOFEN 10 MG: 10 TABLET ORAL at 20:24

## 2023-01-15 RX ADMIN — INSULIN LISPRO 3 UNITS: 100 INJECTION, SOLUTION INTRAVENOUS; SUBCUTANEOUS at 16:35

## 2023-01-15 RX ADMIN — CARVEDILOL 3.12 MG: 3.12 TABLET, FILM COATED ORAL at 10:18

## 2023-01-15 RX ADMIN — PIPERACILLIN AND TAZOBACTAM 3375 MG: 3; .375 INJECTION, POWDER, FOR SOLUTION INTRAVENOUS at 17:46

## 2023-01-15 RX ADMIN — FUROSEMIDE 20 MG: 20 TABLET ORAL at 10:18

## 2023-01-15 RX ADMIN — SODIUM CHLORIDE: 9 INJECTION, SOLUTION INTRAVENOUS at 05:53

## 2023-01-15 RX ADMIN — PIPERACILLIN AND TAZOBACTAM 3375 MG: 3; .375 INJECTION, POWDER, FOR SOLUTION INTRAVENOUS at 01:18

## 2023-01-15 RX ADMIN — MEMANTINE HYDROCHLORIDE 10 MG: 10 TABLET, FILM COATED ORAL at 09:40

## 2023-01-15 RX ADMIN — PIPERACILLIN AND TAZOBACTAM 3375 MG: 3; .375 INJECTION, POWDER, FOR SOLUTION INTRAVENOUS at 10:12

## 2023-01-15 RX ADMIN — SODIUM CHLORIDE, PRESERVATIVE FREE 10 ML: 5 INJECTION INTRAVENOUS at 20:24

## 2023-01-15 NOTE — PROGRESS NOTES
Clinical manager Varsha notified of ERCP for 1/16.   Electronically signed by Andreina Wilson RN on 1/15/2023 at 11:26 AM

## 2023-01-15 NOTE — CONSULTS
INPATIENT CONSULTATION RECORD FOR  1/15/2023      [facility]  [address] [place], [address] [facility]  [phone]        REASON FOR CONSULTATION:      Gross hematuria    HISTORY OF PRESENT ILLNESS:      The patient is a 74 y.o. male patient who presents with GI issues. He has had some gross hematuria. This has resolved. Never smoker. Worked in a [employer]. Denies any fevers or chills. No history of stones. No new issues. Voiding well. His father had bladder cancer for 20 year.        Past Medical History:   Diagnosis Date    COPD (chronic obstructive pulmonary disease) ([facility] [address]     Depression     Di[facility]     Hyperlipidemia     Hypertension     Thyroid disease          Past Surgical History:   Procedure Laterality Date    CARDIAC CATHETERIZATION  1988    HERNIA REPAIR      TOTAL KNEE ARTHROPLASTY Right 10/2022       Medications Prior to Admission:    Medications Prior to Admission: risperiDONE (RISPERDAL) 0.25 MG tablet, Take 0.25 mg by mouth 2 times daily  baclofen (LIORESAL) 10 MG tablet, Take 10 mg by mouth 2 times daily One PO Q AM - Two PO Q HS  carvedilol (COREG) 6.25 MG tablet, Take 3.125 mg by mouth 2 times daily  levothyroxine (SYNTHROID) 125 MCG tablet, Take 125 mcg by mouth Daily  lisinopril (PRINIVIL;ZESTRIL) 2.5 MG tablet, Take 2.5 mg by mouth daily  memantine (NAMENDA) 10 MG tablet, Take 10 mg by mouth 2 times daily  furosemide (LASIX) 20 MG tablet, Take 20 mg by mouth daily  sertraline (ZOLOFT) 100 MG tablet, Take 100 mg by mouth daily One PO Q AM  sertraline (ZOLOFT) 50 MG tablet, Take 50 mg by mouth daily One PO Q HS  atorvastatin (LIPITOR) 20 MG tablet, Take 20 mg by mouth daily  aspirin 81 MG EC tablet, Take 81 mg by mouth daily  insulin aspart (NOVOLOG) 100 UNIT/ML injection vial, Inject 14 Units into the skin 3 times daily  insulin glargine (LANTUS) 100 UNIT/ML injection vial, Inject 20 Units into the skin 2 times daily    Allergies: Adhesive tape and Hydrocodone    Social History:    reports that he has never smoked. He has never been exposed to tobacco smoke. He has never used smokeless tobacco. He reports that he does not drink alcohol and does not use drugs. Family History:   Non-contributory to this Urological problem  family history includes Cancer in his father; Diabetes in his maternal grandfather, maternal grandmother, paternal grandfather, and paternal grandmother. REVIEW OF SYSTEMS:  Respiratory: negative for cough and hemoptysis  Cardiovascular: negative for chest pain and dyspnea  Gastrointestinal: negative for abdominal pain, diarrhea, nausea and vomiting  Derm: negative for rash and skin lesion(s)  Neurological: negative for seizures and tremors  Endocrine: negative for diabetic symptoms including polydipsia and polyuria  : As above in the HPI, otherwise negative  All other systems negative    PHYSICAL EXAM:    Vitals:  BP (!) 140/71   Pulse 69   Temp 98 °F (36.7 °C) (Oral)   Resp 18   Ht 6' 2\" (1.88 m)   Wt 240 lb (108.9 kg)   SpO2 98%   BMI 30.81 kg/m²     General:  Awake, alert, oriented X 3. Well developed, well nourished, well groomed. No apparent distress. HEENT:  Normocephalic, atraumatic. Pupils equal, round. No scleral icterus. No conjunctival injection. Normal lips, teeth, and gums. No nasal discharge. Neck:  Supple, no masses. Heart:  RRR  Lungs:  No audible wheezing. Respirations symmetric and non-labored.   Abdomen:  soft, nontender, no masses, no organomegaly, no peritoneal signs  Extremities:  No clubbing, cyanosis, or edema  Skin:  Warm and dry, no open lesions or rashes  Neuro:  Cranial nerves 2-12 intact, no focal deficits  Rectal: deferred  Genitalia:  deferred    LABS:    Lab Results   Component Value Date    WBC 3.5 (L) 01/15/2023    HGB 10.5 (L) 01/15/2023    HCT 31.6 (L) 01/15/2023    MCV 82.9 01/15/2023     01/15/2023       Lab Results   Component Value Date    CREATININE 1.3 (H) 01/15/2023       No results found for: PSA    Lab Results   Component Value Date    LABURIN Growth not present, incubation continues 01/14/2023       Lab Results   Component Value Date    BC 24 Hours no growth 01/14/2023       Lab Results   Component Value Date    BLOODCULT2 24 Hours no growth 01/14/2023     Imaging:     Impression   Limited evaluation of solid organs due to lack of intravenous contrast.  No   focal liver lesion or contour irregularity. No perihepatic ascites or gross   low signal associations when compared to the spleen       Pneumobilia present with biliary stent in place status post cholecystectomy. Nonobstructing left nephrolithiasis       Splenomegaly             ASSESSMENT:   73 y/o M with gross hematuria, Left renal calculus    PLAN:    Will need outpatient cystoscopy. Urine has cleared. Will send urine for cytology.    Follow up in the office for cystoscopy but he is very hesitant based on his father having issues with this in the past.         Gail Kenny MD,   12:38 PM  1/15/2023

## 2023-01-15 NOTE — PROGRESS NOTES
[facility] Progress Note    Admitting Date and Time: 1/13/2023  9:47 PM  Admit Dx: Intractable nausea and vomiting [R11.2]    Subjective:  Patient is being followed for Intractable nausea and vomiting [R11.2]     Patient has no complaints at this time    ROS: denies fever, chills, cp, sob, n/v, HA unless stated above.       insulin lispro  0-4 Units SubCUTAneous TID WC    insulin lispro  0-4 Units SubCUTAneous Nightly    [START ON 1/16/2023] lactated ringers bolus  1,000 mL IntraVENous Once    [START ON 1/16/2023] indomethacin  100 mg Rectal 60 Min Pre-Op    insulin lispro  14 Units SubCUTAneous TID WC    pantoprazole (PROTONIX) 40 mg injection  40 mg IntraVENous Daily    piperacillin-tazobactam  3,375 mg IntraVENous Q8H    aspirin  81 mg Oral Daily    atorvastatin  20 mg Oral Daily    baclofen  10 mg Oral BID    carvedilol  3.125 mg Oral BID    furosemide  20 mg Oral Daily    levothyroxine  125 mcg Oral Daily    lisinopril  2.5 mg Oral Daily    memantine  10 mg Oral BID    risperiDONE  0.25 mg Oral BID    sertraline  100 mg Oral Daily    sertraline  50 mg Oral QHS    sodium chloride flush  5-40 mL IntraVENous 2 times per day     glucose, 4 tablet, PRN  dextrose bolus, 125 mL, PRN   Or  dextrose bolus, 250 mL, PRN  glucagon (rDNA), 1 mg, PRN  dextrose, , Continuous PRN  trimethobenzamide, 200 mg, Q6H PRN  sodium chloride flush, 5-40 mL, PRN  sodium chloride, , PRN  acetaminophen, 650 mg, Q6H PRN   Or  acetaminophen, 650 mg, Q6H PRN  polyethylene glycol, 17 g, Daily PRN         Objective:    BP (!) 140/71   Pulse 69   Temp 98 °F (36.7 °C) (Oral)   Resp 18   Ht 6' 2\" (1.88 m)   Wt 240 lb (108.9 kg)   SpO2 98%   BMI 30.81 kg/m²     General Appearance: alert and oriented to person, place and time and in no acute distress  Skin: warm and dry  Head: normocephalic and atraumatic  Eyes: pupils equal, round, and reactive to light, extraocular eye movements intact, conjunctivae normal  Neck: neck supple and non tender without mass   Pulmonary/Chest: clear to auscultation bilaterally- no wheezes, rales or rhonchi, normal air movement, no respiratory distress  Cardiovascular: normal rate, normal S1 and S2 and no carotid bruits  Abdomen: soft, non-tender, non-distended, normal bowel sounds, no masses or organomegaly  Extremities: no cyanosis, no clubbing and no edema  Neurologic: no cranial nerve deficit and speech normal        Recent Labs     01/13/23  2245 01/14/23  0140 01/15/23  0311   NA  --  137 136   K  --  3.7 3.6   CL  --  100 101   CO2  --  24 24   BUN  --  22 16   CREATININE  --  1.3* 1.3*   GLUCOSE 211 217* 206*   CALCIUM  --  8.9 8.9       Recent Labs     01/14/23  0140 01/15/23  0311   WBC 5.0 3.5*   RBC 3.85 3.81   HGB 10.7* 10.5*   HCT 32.6* 31.6*   MCV 84.7 82.9   MCH 27.8 27.6   MCHC 32.8 33.2   RDW 13.2 13.2    167   MPV 10.5 10.4           Assessment:    Principal Problem:    Intractable nausea and vomiting  Active Problems:    Diabetes mellitus (HCC)    Hypertension    Depression    Hypothyroidism  Resolved Problems:    * No resolved hospital problems. *      Plan:    Pneumobilia with biliary stent - Continue NS @ 100, and zosyn. GI on board. BCx are pending. ERCP tomorrow  Non oliguric ROCKY - Hold nephrotoxins. Continue NS. Will check labs in the AM  Hematuria - Cytoscopy as outpatient as per urology  DM-II, hyperglycemia- A1c is 8.3. Patient is on lantus 20 u BID and lispro 14 u TID. Home lispro has been initiated as well as insulin SS  Hypothyroidism - Resume synthroid  HTN - Resume coreg and lisinopril  HLD - Resume lipitor  Depression - Resume zoloft  DVT prophylaxis - SCD      NOTE: This report was transcribed using voice recognition software. Every effort was made to ensure accuracy; however, inadvertent computerized transcription errors may be present.  Electronically signed by Roseanna Velez DO on 1/15/2023 at 1:16 PM

## 2023-01-15 NOTE — PLAN OF CARE
Problem: Safety - Adult  Goal: Free from fall injury  Outcome: Progressing     Problem: Chronic Conditions and Co-morbidities  Goal: Patient's chronic conditions and co-morbidity symptoms are monitored and maintained or improved  Outcome: Progressing     Problem: Nutrition Deficit:  Goal: Optimize nutritional status  Outcome: Progressing

## 2023-01-15 NOTE — PROGRESS NOTES
PROGRESS NOTE    Patient Presents with/Seen in Consultation For      Reason for Consult: intractable N/V, pneumobilia on CT scan, stent in common bile duct     CHIEF COMPLAINT:  nausea with vomiting x 3 days    Subjective:     Patient seen laying in bed, in NAD. CT & US reviewed with the patient. Tolerating clears. Slight nausea, denies any emesis. No BM, +flatus. ERCP procedure explained to the patient, all questions answered. Review of Systems  Aside from what was mentioned in the PMH and HPI, essentially unremarkable, all others negative. Objective:     Patient Vitals for the past 8 hrs:   BP Temp Temp src Pulse Resp SpO2   01/15/23 1018 (!) 140/71 -- -- 69 -- --   01/15/23 0721 (!) 154/76 98 °F (36.7 °C) Oral 76 18 98 %       General appearance: alert, awake, laying in bed, and cooperative  Eyes: conjunctivae/corneas clear. PERRL.   Lungs: clear/ diminished to auscultation bilaterally  Heart: regular rate and rhythm, no murmur, 2+ pulses;  trace edema at bilat ankles  Abdomen: soft, non-tender; bowel sounds normal; no masses,  no organomegaly  Extremities: trace edema  Pulses: 2+ and symmetric  Skin: Skin color jaundiced, texture, turgor normal.   Neurologic: Grossly normal    glucose chewable tablet 16 g, PRN  dextrose bolus 10% 125 mL, PRN   Or  dextrose bolus 10% 250 mL, PRN  glucagon (rDNA) injection 1 mg, PRN  dextrose 10 % infusion, Continuous PRN  insulin lispro (HUMALOG) injection vial 0-4 Units, TID WC  insulin lispro (HUMALOG) injection vial 0-4 Units, Nightly  pantoprazole (PROTONIX) 40 mg in sodium chloride (PF) 0.9 % 10 mL injection, Daily  piperacillin-tazobactam (ZOSYN) 3,375 mg in dextrose 5 % 50 mL IVPB extended infusion (mini-bag), Q8H  trimethobenzamide (TIGAN) injection 200 mg, Q6H PRN  aspirin EC tablet 81 mg, Daily  atorvastatin (LIPITOR) tablet 20 mg, Daily  baclofen (LIORESAL) tablet 10 mg, BID  carvedilol (COREG) tablet 3.125 mg, BID  furosemide (LASIX) tablet 20 mg, Daily  levothyroxine (SYNTHROID) tablet 125 mcg, Daily  lisinopril (PRINIVIL;ZESTRIL) tablet 2.5 mg, Daily  memantine (NAMENDA) tablet 10 mg, BID  risperiDONE (RISPERDAL) tablet 0.25 mg, BID  sertraline (ZOLOFT) tablet 100 mg, Daily  sertraline (ZOLOFT) tablet 50 mg, QHS  sodium chloride flush 0.9 % injection 5-40 mL, 2 times per day  sodium chloride flush 0.9 % injection 5-40 mL, PRN  0.9 % sodium chloride infusion, PRN  acetaminophen (TYLENOL) tablet 650 mg, Q6H PRN   Or  acetaminophen (TYLENOL) suppository 650 mg, Q6H PRN  polyethylene glycol (GLYCOLAX) packet 17 g, Daily PRN         Data Review  CBC:   Lab Results   Component Value Date/Time    WBC 3.5 01/15/2023 03:11 AM    RBC 3.81 01/15/2023 03:11 AM    HGB 10.5 01/15/2023 03:11 AM    HCT 31.6 01/15/2023 03:11 AM    MCV 82.9 01/15/2023 03:11 AM    MCH 27.6 01/15/2023 03:11 AM    MCHC 33.2 01/15/2023 03:11 AM    RDW 13.2 01/15/2023 03:11 AM     01/15/2023 03:11 AM    MPV 10.4 01/15/2023 03:11 AM     CMP:    Lab Results   Component Value Date/Time     01/15/2023 03:11 AM    K 3.6 01/15/2023 03:11 AM    K 3.7 01/14/2023 01:40 AM     01/15/2023 03:11 AM    CO2 24 01/15/2023 03:11 AM    BUN 16 01/15/2023 03:11 AM    CREATININE 1.3 01/15/2023 03:11 AM    LABGLOM >60 01/15/2023 03:11 AM    GLUCOSE 206 01/15/2023 03:11 AM    PROT 6.3 01/15/2023 03:11 AM    LABALBU 3.3 01/15/2023 03:11 AM    CALCIUM 8.9 01/15/2023 03:11 AM    BILITOT 3.1 01/15/2023 03:11 AM    ALKPHOS 434 01/15/2023 03:11 AM    AST 69 01/15/2023 03:11 AM     01/15/2023 03:11 AM     Hepatic Function Panel:    Lab Results   Component Value Date/Time    ALKPHOS 434 01/15/2023 03:11 AM     01/15/2023 03:11 AM    AST 69 01/15/2023 03:11 AM    PROT 6.3 01/15/2023 03:11 AM    BILITOT 3.1 01/15/2023 03:11 AM    BILIDIR 2.6 01/14/2023 12:51 PM    IBILI 1.0 01/14/2023 12:51 PM    LABALBU 3.3 01/15/2023 03:11 AM     CT ABDOMEN PELVIS WO CONTRAST Additional Contrast? None    Result Date: 1/14/2023  EXAMINATION: CT OF THE ABDOMEN AND PELVIS WITHOUT CONTRAST 1/14/2023 2:14 pm TECHNIQUE: CT of the abdomen and pelvis was performed without the administration of intravenous contrast. Multiplanar reformatted images are provided for review. Automated exposure control, iterative reconstruction, and/or weight based adjustment of the mA/kV was utilized to reduce the radiation dose to as low as reasonably achievable. COMPARISON: None. HISTORY: ORDERING SYSTEM PROVIDED HISTORY: elevated lft TECHNOLOGIST PROVIDED HISTORY: Reason for exam:->elevated lft Additional Contrast?->None FINDINGS: Lower Chest: Lung bases reveal opacifications at the right lung base largely reticular however somewhat confluent intermixed atelectasis or minimal airspace disease not excluded without pleural effusion. Organs: Limited evaluation of solid organs due to lack of intravenous contrast.  Liver without focal liver lesion. Pneumobilia present status post cholecystectomy may be from prior instrumentation with a biliary stent in place terminating within the duodenum. Pancreas is grossly unremarkable without acute inflammation or distal pancreatic tail atrophy. Spleen is enlarged up to 15 cm in craniocaudal dimension. Adrenals without nodule. Kidneys without suspicious renal lesion and no hydronephrosis. Left intrarenal stone of nonobstructing left nephrolithiasis. GI/Bowel: Small hiatal hernia. No focal thickening or disproportion dilatation of bowel. No inflammatory findings. Redundant rectosigmoid colon with sigmoid diverticulosis however no evidence for acute diverticulitis. Pelvis: No suspicious pelvic lesion or bulky pelvic adenopathy/free fluid. Mild prostatomegaly with calcifications. Peritoneum/Retroperitoneum: No bulky retroperitoneal adenopathy.  No suspicious peritoneal or mesenteric process Vasculature: Grossly normal caliber of abdominal aorta and vasculature Bones/Soft Tissues: No acute osseous or soft tissue findings. Fat containing left direct inguinal hernia. Limited evaluation of solid organs due to lack of intravenous contrast.  No focal liver lesion or contour irregularity. No perihepatic ascites or gross low signal associations when compared to the spleen Pneumobilia present with biliary stent in place status post cholecystectomy. Nonobstructing left nephrolithiasis Splenomegaly     US ABDOMEN LIMITED    Result Date: 1/14/2023  EXAMINATION: RIGHT UPPER QUADRANT ULTRASOUND 1/14/2023 6:00 am COMPARISON: CT abdomen pelvis same date HISTORY: ORDERING SYSTEM PROVIDED HISTORY: biliary stent, mild pneumobilia on CT scan TECHNOLOGIST PROVIDED HISTORY: Reason for exam:->biliary stent, mild pneumobilia on CT scan What reading provider will be dictating this exam?->CRC FINDINGS: LIVER:  Mildly increased hepatic parenchymal echogenicity. Hepatopetal flow in the main portal vein. BILIARY SYSTEM:  Status post cholecystectomy. Small agent foci in the liver may reflect the pneumobilia seen on comparison CT. Common bile duct is within normal limits measuring 2 mm at the tom hepaticus by technologist measurements. Extrahepatic biliary tree and biliary stent are however better depicted on comparison CT. RIGHT KIDNEY: The right kidney is grossly unremarkable without evidence of hydronephrosis. PANCREAS:  Not well seen. OTHER: No evidence of right upper quadrant ascites. Pneumobilia is again seen, and is not unexpected in the setting of a biliary stent. The extrahepatic biliary tree is better depicted on the comparison CT; refer to that exam for further evaluation. Assessment:     Principal Problem:  CBD stent- per CT completed at [facility]  Nausea with vomiting  Jaundiced  Elevated LFTs  Epigastric pain  Anemia, normocytic   ROCKY  HX of liver abscess 2014  Colon polyps in 2014    Plan:     [address] pending- noted above  CT ABD WO- noted above  ERCP tomorrow with Dr. Rodrigo Garcia.  Procedure details for ERCP drawn in detail. Complications including but not limited to pancreatitis, perforation, bleeding or infection are discussed in great detail. Risks, benefits, and alternatives have been explained. Pt has understood the information and has agreed to proceed.    NPO P MN  ERCP orders have been placed  Supportive care  OK for clears  Trend labs  IVF and pain management per Primary care  Continue to medicate for nausea as ordered  Continue Protonix as ordered  Will follow       Discussed with Dr. David Hopper per Dr. Christiano Mario, NP-C 1/15/2023 9:00 AM For Dr. Jose Manuel Goodwin

## 2023-01-16 ENCOUNTER — APPOINTMENT (OUTPATIENT)
Dept: GENERAL RADIOLOGY | Age: 68
DRG: 445 | End: 2023-01-16
Attending: INTERNAL MEDICINE
Payer: MEDICARE

## 2023-01-16 LAB
ALBUMIN SERPL-MCNC: 3.2 G/DL (ref 3.5–5.2)
ALP BLD-CCNC: 477 U/L (ref 40–129)
ALT SERPL-CCNC: 148 U/L (ref 0–40)
ANION GAP SERPL CALCULATED.3IONS-SCNC: 12 MMOL/L (ref 7–16)
APTT: 29.7 SEC (ref 24.5–35.1)
AST SERPL-CCNC: 64 U/L (ref 0–39)
BILIRUB SERPL-MCNC: 2.5 MG/DL (ref 0–1.2)
BUN BLDV-MCNC: 12 MG/DL (ref 6–23)
CALCIUM SERPL-MCNC: 9.1 MG/DL (ref 8.6–10.2)
CHLORIDE BLD-SCNC: 101 MMOL/L (ref 98–107)
CO2: 25 MMOL/L (ref 22–29)
CREAT SERPL-MCNC: 1.2 MG/DL (ref 0.7–1.2)
GFR SERPL CREATININE-BSD FRML MDRD: >60 ML/MIN/1.73
GLUCOSE BLD-MCNC: 194 MG/DL (ref 74–99)
HCT VFR BLD CALC: 32.4 % (ref 37–54)
HEMOGLOBIN: 10.8 G/DL (ref 12.5–16.5)
INR BLD: 1.1
MCH RBC QN AUTO: 27.7 PG (ref 26–35)
MCHC RBC AUTO-ENTMCNC: 33.3 % (ref 32–34.5)
MCV RBC AUTO: 83.1 FL (ref 80–99.9)
METER GLUCOSE: 179 MG/DL (ref 74–99)
METER GLUCOSE: 182 MG/DL (ref 74–99)
METER GLUCOSE: 193 MG/DL (ref 74–99)
METER GLUCOSE: 247 MG/DL (ref 74–99)
METER GLUCOSE: 321 MG/DL (ref 74–99)
PDW BLD-RTO: 13.5 FL (ref 11.5–15)
PLATELET # BLD: 175 E9/L (ref 130–450)
PMV BLD AUTO: 10 FL (ref 7–12)
POTASSIUM SERPL-SCNC: 3.6 MMOL/L (ref 3.5–5)
PROTHROMBIN TIME: 12.9 SEC (ref 9.3–12.4)
RBC # BLD: 3.9 E12/L (ref 3.8–5.8)
SODIUM BLD-SCNC: 138 MMOL/L (ref 132–146)
TOTAL PROTEIN: 6.3 G/DL (ref 6.4–8.3)
URINE CULTURE, ROUTINE: NORMAL
WBC # BLD: 4.1 E9/L (ref 4.5–11.5)

## 2023-01-16 PROCEDURE — 85027 COMPLETE CBC AUTOMATED: CPT

## 2023-01-16 PROCEDURE — 2500000003 HC RX 250 WO HCPCS: Performed by: NURSE ANESTHETIST, CERTIFIED REGISTERED

## 2023-01-16 PROCEDURE — C9113 INJ PANTOPRAZOLE SODIUM, VIA: HCPCS

## 2023-01-16 PROCEDURE — 85730 THROMBOPLASTIN TIME PARTIAL: CPT

## 2023-01-16 PROCEDURE — 74330 X-RAY BILE/PANC ENDOSCOPY: CPT

## 2023-01-16 PROCEDURE — 36415 COLL VENOUS BLD VENIPUNCTURE: CPT

## 2023-01-16 PROCEDURE — 99233 SBSQ HOSP IP/OBS HIGH 50: CPT | Performed by: INTERNAL MEDICINE

## 2023-01-16 PROCEDURE — 2709999900 HC NON-CHARGEABLE SUPPLY: Performed by: INTERNAL MEDICINE

## 2023-01-16 PROCEDURE — 80053 COMPREHEN METABOLIC PANEL: CPT

## 2023-01-16 PROCEDURE — 3700000001 HC ADD 15 MINUTES (ANESTHESIA): Performed by: INTERNAL MEDICINE

## 2023-01-16 PROCEDURE — 6360000002 HC RX W HCPCS

## 2023-01-16 PROCEDURE — 85610 PROTHROMBIN TIME: CPT

## 2023-01-16 PROCEDURE — 82962 GLUCOSE BLOOD TEST: CPT

## 2023-01-16 PROCEDURE — 6370000000 HC RX 637 (ALT 250 FOR IP): Performed by: INTERNAL MEDICINE

## 2023-01-16 PROCEDURE — 2580000003 HC RX 258: Performed by: NURSE ANESTHETIST, CERTIFIED REGISTERED

## 2023-01-16 PROCEDURE — 6360000004 HC RX CONTRAST MEDICATION: Performed by: NURSE PRACTITIONER

## 2023-01-16 PROCEDURE — 0FC98ZZ EXTIRPATION OF MATTER FROM COMMON BILE DUCT, VIA NATURAL OR ARTIFICIAL OPENING ENDOSCOPIC: ICD-10-PCS | Performed by: INTERNAL MEDICINE

## 2023-01-16 PROCEDURE — 2580000003 HC RX 258

## 2023-01-16 PROCEDURE — 3609015200 HC ERCP REMOVE CALCULI/DEBRIS BILIARY/PANCREAS DUCT: Performed by: INTERNAL MEDICINE

## 2023-01-16 PROCEDURE — 6370000000 HC RX 637 (ALT 250 FOR IP): Performed by: NURSE PRACTITIONER

## 2023-01-16 PROCEDURE — A4216 STERILE WATER/SALINE, 10 ML: HCPCS

## 2023-01-16 PROCEDURE — 1200000000 HC SEMI PRIVATE

## 2023-01-16 PROCEDURE — 7100000010 HC PHASE II RECOVERY - FIRST 15 MIN: Performed by: INTERNAL MEDICINE

## 2023-01-16 PROCEDURE — 7100000011 HC PHASE II RECOVERY - ADDTL 15 MIN: Performed by: INTERNAL MEDICINE

## 2023-01-16 PROCEDURE — 2580000003 HC RX 258: Performed by: NURSE PRACTITIONER

## 2023-01-16 PROCEDURE — 2720000010 HC SURG SUPPLY STERILE: Performed by: INTERNAL MEDICINE

## 2023-01-16 PROCEDURE — 6360000002 HC RX W HCPCS: Performed by: INTERNAL MEDICINE

## 2023-01-16 PROCEDURE — 2580000003 HC RX 258: Performed by: INTERNAL MEDICINE

## 2023-01-16 PROCEDURE — 6360000002 HC RX W HCPCS: Performed by: NURSE ANESTHETIST, CERTIFIED REGISTERED

## 2023-01-16 PROCEDURE — 3700000000 HC ANESTHESIA ATTENDED CARE: Performed by: INTERNAL MEDICINE

## 2023-01-16 PROCEDURE — C1769 GUIDE WIRE: HCPCS | Performed by: INTERNAL MEDICINE

## 2023-01-16 RX ORDER — PROPOFOL 10 MG/ML
INJECTION, EMULSION INTRAVENOUS CONTINUOUS PRN
Status: DISCONTINUED | OUTPATIENT
Start: 2023-01-16 | End: 2023-01-16 | Stop reason: SDUPTHER

## 2023-01-16 RX ORDER — SODIUM CHLORIDE 9 MG/ML
INJECTION, SOLUTION INTRAVENOUS CONTINUOUS PRN
Status: DISCONTINUED | OUTPATIENT
Start: 2023-01-16 | End: 2023-01-16 | Stop reason: SDUPTHER

## 2023-01-16 RX ORDER — ONDANSETRON 2 MG/ML
INJECTION INTRAMUSCULAR; INTRAVENOUS PRN
Status: DISCONTINUED | OUTPATIENT
Start: 2023-01-16 | End: 2023-01-16 | Stop reason: SDUPTHER

## 2023-01-16 RX ORDER — INSULIN LISPRO 100 [IU]/ML
0-4 INJECTION, SOLUTION INTRAVENOUS; SUBCUTANEOUS EVERY 4 HOURS
Status: DISCONTINUED | OUTPATIENT
Start: 2023-01-16 | End: 2023-01-17

## 2023-01-16 RX ORDER — ONDANSETRON 2 MG/ML
4 INJECTION INTRAMUSCULAR; INTRAVENOUS EVERY 6 HOURS PRN
Status: DISCONTINUED | OUTPATIENT
Start: 2023-01-16 | End: 2023-01-17 | Stop reason: HOSPADM

## 2023-01-16 RX ORDER — LABETALOL HYDROCHLORIDE 5 MG/ML
10 INJECTION, SOLUTION INTRAVENOUS EVERY 4 HOURS PRN
Status: DISCONTINUED | OUTPATIENT
Start: 2023-01-16 | End: 2023-01-17 | Stop reason: HOSPADM

## 2023-01-16 RX ADMIN — INSULIN LISPRO 1 UNITS: 100 INJECTION, SOLUTION INTRAVENOUS; SUBCUTANEOUS at 18:13

## 2023-01-16 RX ADMIN — PIPERACILLIN AND TAZOBACTAM 3375 MG: 3; .375 INJECTION, POWDER, FOR SOLUTION INTRAVENOUS at 00:45

## 2023-01-16 RX ADMIN — SODIUM CHLORIDE, PRESERVATIVE FREE 10 ML: 5 INJECTION INTRAVENOUS at 09:35

## 2023-01-16 RX ADMIN — LISINOPRIL 2.5 MG: 2.5 TABLET ORAL at 08:23

## 2023-01-16 RX ADMIN — SODIUM CHLORIDE, PRESERVATIVE FREE 10 ML: 5 INJECTION INTRAVENOUS at 20:05

## 2023-01-16 RX ADMIN — SODIUM CHLORIDE, POTASSIUM CHLORIDE, SODIUM LACTATE AND CALCIUM CHLORIDE 1000 ML: 600; 310; 30; 20 INJECTION, SOLUTION INTRAVENOUS at 13:13

## 2023-01-16 RX ADMIN — CARVEDILOL 3.12 MG: 3.12 TABLET, FILM COATED ORAL at 20:04

## 2023-01-16 RX ADMIN — PIPERACILLIN AND TAZOBACTAM 3375 MG: 3; .375 INJECTION, POWDER, FOR SOLUTION INTRAVENOUS at 09:34

## 2023-01-16 RX ADMIN — PIPERACILLIN AND TAZOBACTAM 3375 MG: 3; .375 INJECTION, POWDER, FOR SOLUTION INTRAVENOUS at 18:06

## 2023-01-16 RX ADMIN — ATORVASTATIN CALCIUM 20 MG: 20 TABLET, FILM COATED ORAL at 20:04

## 2023-01-16 RX ADMIN — INSULIN LISPRO 7 UNITS: 100 INJECTION, SOLUTION INTRAVENOUS; SUBCUTANEOUS at 08:23

## 2023-01-16 RX ADMIN — GLUCAGON HYDROCHLORIDE 0.25 MG: KIT at 15:34

## 2023-01-16 RX ADMIN — SODIUM CHLORIDE: 9 INJECTION, SOLUTION INTRAVENOUS at 09:30

## 2023-01-16 RX ADMIN — MEMANTINE HYDROCHLORIDE 10 MG: 10 TABLET, FILM COATED ORAL at 20:04

## 2023-01-16 RX ADMIN — ONDANSETRON 4 MG: 2 INJECTION INTRAMUSCULAR; INTRAVENOUS at 15:23

## 2023-01-16 RX ADMIN — SODIUM CHLORIDE: 9 INJECTION, SOLUTION INTRAVENOUS at 18:06

## 2023-01-16 RX ADMIN — INDOMETHACIN 100 MG: 50 SUPPOSITORY RECTAL at 14:21

## 2023-01-16 RX ADMIN — IOPAMIDOL 20 ML: 612 INJECTION, SOLUTION INTRAVENOUS at 16:17

## 2023-01-16 RX ADMIN — CARVEDILOL 3.12 MG: 3.12 TABLET, FILM COATED ORAL at 08:23

## 2023-01-16 RX ADMIN — RISPERIDONE 0.25 MG: 0.25 TABLET, FILM COATED ORAL at 20:04

## 2023-01-16 RX ADMIN — BACLOFEN 10 MG: 10 TABLET ORAL at 20:04

## 2023-01-16 RX ADMIN — PROPOFOL 100 MCG/KG/MIN: 10 INJECTION, EMULSION INTRAVENOUS at 15:22

## 2023-01-16 RX ADMIN — INSULIN LISPRO 3 UNITS: 100 INJECTION, SOLUTION INTRAVENOUS; SUBCUTANEOUS at 20:06

## 2023-01-16 RX ADMIN — SERTRALINE HYDROCHLORIDE 50 MG: 50 TABLET ORAL at 20:04

## 2023-01-16 RX ADMIN — SODIUM CHLORIDE 40 MG: 9 INJECTION, SOLUTION INTRAMUSCULAR; INTRAVENOUS; SUBCUTANEOUS at 05:19

## 2023-01-16 RX ADMIN — SODIUM CHLORIDE: 9 INJECTION, SOLUTION INTRAVENOUS at 15:20

## 2023-01-16 ASSESSMENT — PAIN DESCRIPTION - LOCATION
LOCATION: ABDOMEN

## 2023-01-16 ASSESSMENT — PAIN SCALES - GENERAL
PAINLEVEL_OUTOF10: 0

## 2023-01-16 ASSESSMENT — PAIN DESCRIPTION - ORIENTATION: ORIENTATION: MID

## 2023-01-16 ASSESSMENT — PAIN DESCRIPTION - PAIN TYPE: TYPE: ACUTE PAIN

## 2023-01-16 NOTE — BRIEF OP NOTE
Brief Postoperative Note    Christine Birmingham  YOB: 1955  [id number]    Procedure:  ERCP    Anesthesia: [facility]      Surgeon:  Kandis Hutson MD      Findings: CBD: VERY OLD STENT WITH LARGE AMOUNT OF DEPOSITS AND CALCIFICATIONS ( PLACED 2014 ). BALLOON SWEEPING  REMOVED 2 STONES WITH EXCELLENT DRAINAGE.                          Complications: None      Estimated blood loss: none        Armando North MD

## 2023-01-16 NOTE — PLAN OF CARE
Problem: Safety - Adult  Goal: Free from fall injury  1/16/2023 0148 by Carleen Castle RN  Outcome: Progressing  1/15/2023 2240 by Patricia Gonzáles RN  Outcome: Progressing     Problem: Chronic Conditions and Co-morbidities  Goal: Patient's chronic conditions and co-morbidity symptoms are monitored and maintained or improved  1/16/2023 0148 by Carleen Castle RN  Outcome: Progressing  1/15/2023 2240 by Patricia Gonzáles RN  Outcome: Progressing     Problem: Nutrition Deficit:  Goal: Optimize nutritional status  1/16/2023 0148 by Carlene Castle RN  Outcome: Progressing  1/15/2023 2240 by Patricia Gonzáles RN  Outcome: Progressing     Problem: Discharge Planning  Goal: Discharge to home or other facility with appropriate resources  1/16/2023 0148 by Carleen Castle RN  Outcome: Progressing  1/15/2023 2240 by Patricia Gonzáles RN  Outcome: Progressing     Problem: Pain  Goal: Verbalizes/displays adequate comfort level or baseline comfort level  Outcome: Progressing

## 2023-01-16 NOTE — PROGRESS NOTES
[address] [facility] PACU POST ERCP WITH STENT REMOVAL AND STONES X 2 PT AWAKE DENIES ANY PAIN OR DISCOMFORT RENAE  1630 [facility] PT COMFORTABLE REPORT CALLED TO FLOOR RN AT THIS TIME AND UPDATED ON PT STATUS TRANSPORT REQUEST PLACE

## 2023-01-16 NOTE — PATIENT CARE CONFERENCE
[facility] Quality Flow/Interdisciplinary Rounds Progress Note        Quality Flow Rounds held on January 16, 2023    Disciplines Attending:  Bedside Nurse, , , and Nursing Unit Leadership    Abby Gorman was admitted on 1/13/2023  9:47 PM    Anticipated Discharge Date:       Disposition:    Antoni Score:  Antoni Scale Score: 20    Readmission Risk              Risk of Unplanned Readmission:  9           Discussed patient goal for the day, patient clinical progression, and barriers to discharge.   The following Goal(s) of the Day/Commitment(s) have been identified:  Diagnostics - Report Results and Labs - Report Results      Kathleen Cardozo RN  January 16, 2023

## 2023-01-16 NOTE — PROGRESS NOTES
Spoke to anesthesia about patients morning medications since he is an add on, OK to give 7units of humalog, coreg and lisinopril per anesthesia with a small sip of water.      Electronically signed by Merle Parr RN on 1/16/2023 at 7:45 AM

## 2023-01-16 NOTE — DISCHARGE INSTRUCTIONS
A consult was performed for hematuria (blood in the urine) while you were admitted to the hospital by Dr. Birmingham/Kings/Zenaida  To complete the evaluation you must follow up in the office for an office based procedure, a cystoscopy. Please call the office to a make a follow up appointment upon discharge, [phone].        CALL DR. Nori Davidson OFFICE FOR FOLLOW UP APPOINTMENT.   [phone] [address] [place] [address]    PLEASE HAVE LABS DRAWN 3-4 DAYS BEFORE FOLLOW UP WITH DR. Dany Huang. (SCRIPT GIVEN TO YOU)

## 2023-01-16 NOTE — ANESTHESIA PRE PROCEDURE
Department of Anesthesiology  Preprocedure Note       Name:  Paige Godfrey   Age:  74 y.o.  :  1955                                          MRN:  [id number]         Date:  2023      Surgeon: Randa Johnson):  Kitty Caldwell MD    Procedure: Procedure(s):  ERCP ENDOSCOPIC RETROGRADE CHOLANGIOPANCREATOGRAPHY    Medications prior to admission:   Prior to Admission medications    Medication Sig Start Date End Date Taking?  Authorizing Provider   risperiDONE (RISPERDAL) 0.25 MG tablet Take 0.25 mg by mouth 2 times daily   Yes Historical Provider, MD   baclofen (LIORESAL) 10 MG tablet Take 10 mg by mouth 2 times daily One PO Q AM - Two PO Q HS   Yes Historical Provider, MD   carvedilol (COREG) 6.25 MG tablet Take 3.125 mg by mouth 2 times daily   Yes Historical Provider, MD   levothyroxine (SYNTHROID) 125 MCG tablet Take 125 mcg by mouth Daily   Yes Historical Provider, MD   lisinopril (PRINIVIL;ZESTRIL) 2.5 MG tablet Take 2.5 mg by mouth daily   Yes Historical Provider, MD   memantine (NAMENDA) 10 MG tablet Take 10 mg by mouth 2 times daily   Yes Historical Provider, MD   furosemide (LASIX) 20 MG tablet Take 20 mg by mouth daily   Yes Historical Provider, MD   sertraline (ZOLOFT) 100 MG tablet Take 100 mg by mouth daily One PO Q AM   Yes Historical Provider, MD   sertraline (ZOLOFT) 50 MG tablet Take 50 mg by mouth daily One PO Q HS   Yes Historical Provider, MD   atorvastatin (LIPITOR) 20 MG tablet Take 20 mg by mouth daily   Yes Historical Provider, MD   aspirin 81 MG EC tablet Take 81 mg by mouth daily   Yes Historical Provider, MD   insulin aspart (NOVOLOG) 100 UNIT/ML injection vial Inject 14 Units into the skin 3 times daily   Yes Historical Provider, MD   insulin glargine (LANTUS) 100 UNIT/ML injection vial Inject 20 Units into the skin 2 times daily   Yes Historical Provider, MD       Current medications:    Current Facility-Administered Medications   Medication Dose Route Frequency Provider Last Rate Last Admin    labetalol (NORMODYNE;TRANDATE) injection 10 mg  10 mg IntraVENous Q4H PRN Roseanna J [place], DO        insulin lispro (HUMALOG) injection vial 0-4 Units  0-4 Units SubCUTAneous Q4H Roseanna J [place], DO        glucose chewable tablet 16 g  4 tablet Oral PRN Roseanna J [place], DO        dextrose bolus 10% 125 mL  125 mL IntraVENous PRN Roseanna J Rosie, DO        Or    dextrose bolus 10% 250 mL  250 mL IntraVENous PRN Roseanna J [place], DO        glucagon (rDNA) injection 1 mg  1 mg SubCUTAneous PRN Roseanna J [place], DO        dextrose 10 % infusion   IntraVENous Continuous PRN Roseanna J [place], DO        lactated ringers bolus  1,000 mL IntraVENous Once Brooke A Sugar, APRN -  mL/hr at 01/16/23 1313 1,000 mL at 01/16/23 1313    indomethacin (INDOCIN) 50 MG suppository 100 mg  100 mg Rectal 60 Min Pre-Op Brooke A Sugar, APRN - CNP   100 mg at 01/16/23 1421    pantoprazole (PROTONIX) 40 mg in sodium chloride (PF) 0.9 % 10 mL injection  40 mg IntraVENous Daily Shira J Addicott, APRN - CNP   40 mg at 01/16/23 0519    piperacillin-tazobactam (ZOSYN) 3,375 mg in dextrose 5 % 50 mL IVPB extended infusion (mini-bag)  3,375 mg IntraVENous Q8H Shira Santosott, APRN - CNP   Stopped at 01/16/23 1308    trimethobenzamide (TIGAN) injection 200 mg  200 mg IntraMUSCular Q6H PRN Shira Santosott, APRN - CNP        aspirin EC tablet 81 mg  81 mg Oral Daily Roseanna J [place], DO   81 mg at 01/14/23 1349    atorvastatin (LIPITOR) tablet 20 mg  20 mg Oral Daily Roseanna J Rosie, DO   20 mg at 01/15/23 2024    baclofen (LIORESAL) tablet 10 mg  10 mg Oral BID Roseanna J Rosie, DO   10 mg at 01/15/23 2024    carvedilol (COREG) tablet 3.125 mg  3.125 mg Oral BID Roseanna J Rosie, DO   3.125 mg at 01/16/23 8162    furosemide (LASIX) tablet 20 mg  20 mg Oral Daily Roseanna J Rosie, DO   20 mg at 01/15/23 1018    levothyroxine (SYNTHROID) tablet 125 mcg  125 mcg Oral Daily Annabel Nguyen DO   125 mcg at 01/15/23 0552    lisinopril (PRINIVIL;ZESTRIL) tablet 2.5 mg  2.5 mg Oral Daily Roseanna J [place], DO   2.5 mg at 01/16/23 0823    memantine (NAMENDA) tablet 10 mg  10 mg Oral BID Roseanna J Rosie, DO   10 mg at 01/15/23 2024    risperiDONE (RISPERDAL) tablet 0.25 mg  0.25 mg Oral BID Roseanna J Rosie, DO   0.25 mg at 01/15/23 2024    sertraline (ZOLOFT) tablet 100 mg  100 mg Oral Daily Roseanna J Rosie, DO   100 mg at 01/15/23 1014    sertraline (ZOLOFT) tablet 50 mg  50 mg Oral QHS Roseanna J Rosie, DO   50 mg at 01/15/23 2024    sodium chloride flush 0.9 % injection 5-40 mL  5-40 mL IntraVENous 2 times per day Dellia Yuri Addicott, APRN - CNP   10 mL at 01/16/23 0935    sodium chloride flush 0.9 % injection 5-40 mL  5-40 mL IntraVENous PRN Shira J Addicott, APRN - CNP        0.9 % sodium chloride infusion   IntraVENous PRN Candidaa Yuri Addicott, APRN - CNP 12.5 mL/hr at 01/16/23 0930 New Bag at 01/16/23 0930    acetaminophen (TYLENOL) tablet 650 mg  650 mg Oral Q6H PRN Shira J Addicott, APRN - CNP        Or    acetaminophen (TYLENOL) suppository 650 mg  650 mg Rectal Q6H PRN Shira J Addicott, APRN - CNP        polyethylene glycol (GLYCOLAX) packet 17 g  17 g Oral Daily PRN Shira J Addicott, APRN - CNP           Allergies: Allergies   Allergen Reactions    Adhesive Tape Rash    Hydrocodone Nausea And Vomiting       Problem List:    Patient Active Problem List   Diagnosis Code    Intractable nausea and vomiting R11.2    Diabetes mellitus ([address] E11.9    Hypertension I10    Depression F32. A    Hypothyroidism E03.9       Past Medical History:        Diagnosis Date    [facility] obstructive [facility] disease) [facility] [address]     Depression     Diabetes mellitus ([facility] [address]     Hyperlipidemia     Hypertension     Thyroid disease        Past Surgical History:        Procedure Laterality Date    CARDIAC CATHETERIZATION  1988    HERNIA REPAIR      TOTAL KNEE ARTHROPLASTY Right 10/2022       Social History:    Social History     Tobacco Use    Smoking status: Never     Passive exposure: Never    Smokeless tobacco: Never   Substance Use Topics    Alcohol use: Never                                Counseling given: Not Answered      Vital Signs (Current):   Vitals:    01/16/23 0533 01/16/23 0652 01/16/23 0823 01/16/23 0915   BP:  (!) 178/85 (!) 178/85 (!) 151/86   Pulse:  73  72   Resp:  18     Temp:  37.1 °C (98.7 °F)     TempSrc:  Oral     SpO2:  96%     Weight: 235 lb (106.6 kg)      Height:                                                  BP Readings from Last 3 Encounters:   01/16/23 (!) 151/86       NPO Status: Time of last liquid consumption: 0830                        Time of last solid consumption: 1000                        Date of last liquid consumption: 01/16/23                        Date of last solid food consumption: 01/11/23    BMI:   Wt Readings from Last 3 Encounters:   01/16/23 235 lb (106.6 kg)     Body mass index is 30.17 kg/m². CBC:   Lab Results   Component Value Date/Time    WBC 4.1 01/16/2023 03:25 AM    RBC 3.90 01/16/2023 03:25 AM    HGB 10.8 01/16/2023 03:25 AM    HCT 32.4 01/16/2023 03:25 AM    MCV 83.1 01/16/2023 03:25 AM    RDW 13.5 01/16/2023 03:25 AM     01/16/2023 03:25 AM       CMP:   Lab Results   Component Value Date/Time     01/16/2023 03:25 AM    K 3.6 01/16/2023 03:25 AM    K 3.7 01/14/2023 01:40 AM     01/16/2023 03:25 AM    CO2 25 01/16/2023 03:25 AM    BUN 12 01/16/2023 03:25 AM    CREATININE 1.2 01/16/2023 03:25 AM    LABGLOM >60 01/16/2023 03:25 AM    GLUCOSE 194 01/16/2023 03:25 AM    PROT 6.3 01/16/2023 03:25 AM    CALCIUM 9.1 01/16/2023 03:25 AM    BILITOT 2.5 01/16/2023 03:25 AM    ALKPHOS 599 01/16/2023 03:25 AM    AST 64 01/16/2023 03:25 AM     01/16/2023 03:25 AM       POC Tests: No results for input(s): POCGLU, POCNA, POCK, POCCL, POCBUN, POCHEMO, POCHCT in the last 72 hours.     Coags:   Lab Results   Component Value Date/Time    PROTIME 12.9 01/16/2023 03:25 AM    INR 1.1 01/16/2023 03:25 AM    APTT 29.7 01/16/2023 03:25 AM       HCG (If Applicable): No results found for: PREGTESTUR, PREGSERUM, HCG, HCGQUANT     ABGs: No results found for: PHART, PO2ART, VKK9AYN, OYB2GFY, BEART, C8WHIENC     Type & Screen (If Applicable):  No results found for: LABABO, LABRH    Drug/Infectious Status (If Applicable):  No results found for: HIV, HEPCAB    COVID-19 Screening (If Applicable):   Lab Results   Component Value Date/Time    COVID19 Not Detected 01/14/2023 06:15 AM           Anesthesia Evaluation  Patient summary reviewed  Airway: Mallampati: III  TM distance: >3 FB     Mouth opening: > = 3 FB   Dental:          Pulmonary: breath sounds clear to auscultation  (+) COPD:                             Cardiovascular:    (+) hypertension:, hyperlipidemia        Rhythm: regular  Rate: normal                    Neuro/Psych:   (+) psychiatric history (Depression):            GI/Hepatic/Renal:   (+) renal disease: kidney stones and ARF,          ROS comment: Intractable nausea and vomiting    CT A/P 1/14/2022:  Limited evaluation of solid organs due to lack of intravenous contrast.  No  focal liver lesion or contour irregularity.  No perihepatic ascites or gross  low signal associations when compared to the spleen     Pneumobilia present with biliary stent in place status post cholecystectomy.     Nonobstructing left nephrolithiasis     Splenomegaly    . Endo/Other:    (+) DiabetesType II DM, using insulin, hypothyroidism, blood dyscrasia: anemia:., .                 Abdominal:             Vascular: negative vascular ROS. Other Findings:           Anesthesia Plan      MAC     ASA 3       Induction: intravenous. Patient seen and chart reviewed. No interval change in history or exam.   Anesthesia plan discussed, risk/benefits addressed. Patient's concerns and questions answered.      Gokul Beck, APRN - CRNA  January 16, 2023  2:57 PM

## 2023-01-16 NOTE — CARE COORDINATION
Transition of Care-Met with patient and his wife at the bedside, introduced myself and CM role. Patient and wife reside in a two story home, bed/bath are on the first floor. Patient reports being independent, uses no DME. Primary care provided at [facility], his provider is Dr. Mason Moore, preferred pharmacy is [facility] pharmacy. Patient had outpatient physical therapy after a knee replacement and a short stay at [facility] after a stroke many years ago, as well as home health care, however does not recall company. Discharge plan is to return home, wife to transport, does not anticipate any discharge needs. Plan for ERCP and remains on Zosyn Q8. Confirmed Health insurance-[employer].     Klever COWAN, RN  [facility]

## 2023-01-16 NOTE — PROGRESS NOTES
Patient for ERCP today with Dr. Lopes. Additional questions and concerns addressed. Labs and chart reviewed. D/w endoscopy. Consent signed. Ok to proceed.    MARCUS Smith - CNP 1/16/2023 9:59 AM

## 2023-01-16 NOTE — PROGRESS NOTES
[facility] Progress Note    Admitting Date and Time: 1/13/2023  9:47 PM  Admit Dx: Intractable nausea and vomiting [R11.2]    Subjective:  Patient is being followed for Intractable nausea and vomiting [R11.2]     No acute events overnight    ROS: denies fever, chills, cp, sob, n/v, HA unless stated above.       insulin lispro  0-4 Units SubCUTAneous TID WC    insulin lispro  0-4 Units SubCUTAneous Nightly    lactated ringers bolus  1,000 mL IntraVENous Once    indomethacin  100 mg Rectal 60 Min Pre-Op    insulin lispro  14 Units SubCUTAneous TID WC    pantoprazole (PROTONIX) 40 mg injection  40 mg IntraVENous Daily    piperacillin-tazobactam  3,375 mg IntraVENous Q8H    aspirin  81 mg Oral Daily    atorvastatin  20 mg Oral Daily    baclofen  10 mg Oral BID    carvedilol  3.125 mg Oral BID    furosemide  20 mg Oral Daily    levothyroxine  125 mcg Oral Daily    lisinopril  2.5 mg Oral Daily    memantine  10 mg Oral BID    risperiDONE  0.25 mg Oral BID    sertraline  100 mg Oral Daily    sertraline  50 mg Oral QHS    sodium chloride flush  5-40 mL IntraVENous 2 times per day     glucose, 4 tablet, PRN  dextrose bolus, 125 mL, PRN   Or  dextrose bolus, 250 mL, PRN  glucagon (rDNA), 1 mg, PRN  dextrose, , Continuous PRN  trimethobenzamide, 200 mg, Q6H PRN  sodium chloride flush, 5-40 mL, PRN  sodium chloride, , PRN  acetaminophen, 650 mg, Q6H PRN   Or  acetaminophen, 650 mg, Q6H PRN  polyethylene glycol, 17 g, Daily PRN         Objective:    BP (!) 178/85   Pulse 73   Temp 98.7 °F (37.1 °C) (Oral)   Resp 18   Ht 6' 2\" (1.88 m)   Wt 235 lb (106.6 kg)   SpO2 96%   BMI 30.17 kg/m²     General Appearance: alert and oriented to person, place and time and in no acute distress  Skin: warm and dry  Head: normocephalic and atraumatic  Eyes: pupils equal, round, and reactive to light, extraocular eye movements intact, conjunctivae normal  Neck: neck supple and non tender without mass   Pulmonary/Chest: clear to auscultation bilaterally- no wheezes, rales or rhonchi, normal air movement, no respiratory distress  Cardiovascular: normal rate, normal S1 and S2 and no carotid bruits  Abdomen: soft, non-tender, non-distended, normal bowel sounds, no masses or organomegaly  Extremities: no cyanosis, no clubbing and no edema  Neurologic: no cranial nerve deficit and speech normal        Recent Labs     01/14/23  0140 01/15/23  0311 01/16/23  0325    136 138   K 3.7 3.6 3.6    101 101   CO2 24 24 25   BUN 22 16 12   CREATININE 1.3* 1.3* 1.2   GLUCOSE 217* 206* 194*   CALCIUM 8.9 8.9 9.1       Recent Labs     01/14/23  0140 01/15/23  0311 01/16/23  0325   WBC 5.0 3.5* 4.1*   RBC 3.85 3.81 3.90   HGB 10.7* 10.5* 10.8*   HCT 32.6* 31.6* 32.4*   MCV 84.7 82.9 83.1   MCH 27.8 27.6 27.7   MCHC 32.8 33.2 33.3   RDW 13.2 13.2 13.5    167 175   MPV 10.5 10.4 10.0         Assessment:    Principal Problem:    Intractable nausea and vomiting  Active Problems:    Diabetes mellitus ([facility] [address]    Hypertension    Depression    Hypothyroidism  Resolved Problems:    * No resolved hospital problems. *      Plan:    Pneumobilia with biliary stent - Continue NS @ 100, and zosyn. GI on board. BCx are negative thus far. ERCP tomorrow  Non oliguric ROCKY - Resolved  Hematuria - Cytoscopy as outpatient as per urology  DM-II, hyperglycemia- A1c is 8.3. As he is NPO will start on high dose insulin SS q4  Hypothyroidism - Resume synthroid  HTN, uncontrolled- Currently NPO and so hold coreg and lisinopril however will add labetalol IV PRN. HLD - Resume lipitor  Depression - Resume zoloft  DVT prophylaxis - SCD    NOTE: This report was transcribed using voice recognition software. Every effort was made to ensure accuracy; however, inadvertent computerized transcription errors may be present.     Electronically signed by Samir Hoang DO on 1/16/2023 at 8:44 AM

## 2023-01-16 NOTE — ANESTHESIA POSTPROCEDURE EVALUATION
Department of Anesthesiology  Postprocedure Note    Patient: Ld Almonte  MRN: [id number]  YOB: 1955  Date of evaluation: 1/16/2023      Procedure Summary     Date: 01/16/23 Room / Location: [facility]    Anesthesia Start: 1520 Anesthesia Stop: 3386    Procedures:       ERCP ENDOSCOPIC RETROGRADE CHOLANGIOPANCREATOGRAPHY      Procedure Not Yet Scheduled Diagnosis:       Nausea and vomiting, unspecified vomiting type      (Nausea and vomiting, unspecified vomiting type [R11.2])    Surgeons: Enzo Kraft MD Responsible Provider: Rutherford Kocher., MD    Anesthesia Type: MAC ASA Status: 3          Anesthesia Type: No value filed.     Mele Phase I:      Mele Phase II:        Anesthesia Post Evaluation    Patient location during evaluation: bedside  Patient participation: complete - patient participated  Level of consciousness: awake and alert  Pain score: 0  Airway patency: patent  Nausea & Vomiting: no nausea and no vomiting  Complications: no  Cardiovascular status: blood pressure returned to baseline  Respiratory status: acceptable  Hydration status: euvolemic

## 2023-01-17 VITALS
OXYGEN SATURATION: 95 % | DIASTOLIC BLOOD PRESSURE: 72 MMHG | HEART RATE: 63 BPM | BODY MASS INDEX: 30.16 KG/M2 | SYSTOLIC BLOOD PRESSURE: 151 MMHG | TEMPERATURE: 97.8 F | HEIGHT: 74 IN | RESPIRATION RATE: 16 BRPM | WEIGHT: 235 LBS

## 2023-01-17 LAB
ALBUMIN SERPL-MCNC: 3.2 G/DL (ref 3.5–5.2)
ALP BLD-CCNC: 455 U/L (ref 40–129)
ALT SERPL-CCNC: 152 U/L (ref 0–40)
ANION GAP SERPL CALCULATED.3IONS-SCNC: 13 MMOL/L (ref 7–16)
AST SERPL-CCNC: 101 U/L (ref 0–39)
BILIRUB SERPL-MCNC: 1.9 MG/DL (ref 0–1.2)
BUN BLDV-MCNC: 16 MG/DL (ref 6–23)
CALCIUM SERPL-MCNC: 8.9 MG/DL (ref 8.6–10.2)
CHLORIDE BLD-SCNC: 102 MMOL/L (ref 98–107)
CO2: 24 MMOL/L (ref 22–29)
CREAT SERPL-MCNC: 1.2 MG/DL (ref 0.7–1.2)
GFR SERPL CREATININE-BSD FRML MDRD: >60 ML/MIN/1.73
GLUCOSE BLD-MCNC: 225 MG/DL (ref 74–99)
HBA1C MFR BLD: 8.3 % (ref 4–5.6)
HCT VFR BLD CALC: 32.9 % (ref 37–54)
HEMOGLOBIN: 11 G/DL (ref 12.5–16.5)
LIPASE: 33 U/L (ref 13–60)
MCH RBC QN AUTO: 28.1 PG (ref 26–35)
MCHC RBC AUTO-ENTMCNC: 33.4 % (ref 32–34.5)
MCV RBC AUTO: 84.1 FL (ref 80–99.9)
METER GLUCOSE: 211 MG/DL (ref 74–99)
METER GLUCOSE: 261 MG/DL (ref 74–99)
METER GLUCOSE: 293 MG/DL (ref 74–99)
METER GLUCOSE: 332 MG/DL (ref 74–99)
PDW BLD-RTO: 13.1 FL (ref 11.5–15)
PLATELET # BLD: 185 E9/L (ref 130–450)
PMV BLD AUTO: 10.1 FL (ref 7–12)
POTASSIUM SERPL-SCNC: 3.6 MMOL/L (ref 3.5–5)
RBC # BLD: 3.91 E12/L (ref 3.8–5.8)
SODIUM BLD-SCNC: 139 MMOL/L (ref 132–146)
TOTAL PROTEIN: 5.9 G/DL (ref 6.4–8.3)
WBC # BLD: 3.6 E9/L (ref 4.5–11.5)

## 2023-01-17 PROCEDURE — 6360000002 HC RX W HCPCS: Performed by: INTERNAL MEDICINE

## 2023-01-17 PROCEDURE — C9113 INJ PANTOPRAZOLE SODIUM, VIA: HCPCS | Performed by: INTERNAL MEDICINE

## 2023-01-17 PROCEDURE — 85027 COMPLETE CBC AUTOMATED: CPT

## 2023-01-17 PROCEDURE — 6370000000 HC RX 637 (ALT 250 FOR IP): Performed by: INTERNAL MEDICINE

## 2023-01-17 PROCEDURE — 36415 COLL VENOUS BLD VENIPUNCTURE: CPT

## 2023-01-17 PROCEDURE — 2580000003 HC RX 258: Performed by: INTERNAL MEDICINE

## 2023-01-17 PROCEDURE — 83690 ASSAY OF LIPASE: CPT

## 2023-01-17 PROCEDURE — A4216 STERILE WATER/SALINE, 10 ML: HCPCS | Performed by: INTERNAL MEDICINE

## 2023-01-17 PROCEDURE — 80053 COMPREHEN METABOLIC PANEL: CPT

## 2023-01-17 PROCEDURE — 82962 GLUCOSE BLOOD TEST: CPT

## 2023-01-17 PROCEDURE — 99239 HOSP IP/OBS DSCHRG MGMT >30: CPT | Performed by: INTERNAL MEDICINE

## 2023-01-17 PROCEDURE — 83036 HEMOGLOBIN GLYCOSYLATED A1C: CPT

## 2023-01-17 RX ORDER — INSULIN LISPRO 100 [IU]/ML
0-4 INJECTION, SOLUTION INTRAVENOUS; SUBCUTANEOUS
Status: DISCONTINUED | OUTPATIENT
Start: 2023-01-17 | End: 2023-01-17 | Stop reason: HOSPADM

## 2023-01-17 RX ORDER — INSULIN LISPRO 100 [IU]/ML
0-4 INJECTION, SOLUTION INTRAVENOUS; SUBCUTANEOUS NIGHTLY
Status: DISCONTINUED | OUTPATIENT
Start: 2023-01-17 | End: 2023-01-17 | Stop reason: HOSPADM

## 2023-01-17 RX ORDER — AMOXICILLIN AND CLAVULANATE POTASSIUM 875; 125 MG/1; MG/1
1 TABLET, FILM COATED ORAL 2 TIMES DAILY
Qty: 8 TABLET | Refills: 0 | Status: SHIPPED | OUTPATIENT
Start: 2023-01-17 | End: 2023-01-21

## 2023-01-17 RX ORDER — INSULIN LISPRO 100 [IU]/ML
14 INJECTION, SOLUTION INTRAVENOUS; SUBCUTANEOUS
Status: DISCONTINUED | OUTPATIENT
Start: 2023-01-17 | End: 2023-01-17 | Stop reason: HOSPADM

## 2023-01-17 RX ADMIN — PIPERACILLIN AND TAZOBACTAM 3375 MG: 3; .375 INJECTION, POWDER, FOR SOLUTION INTRAVENOUS at 00:45

## 2023-01-17 RX ADMIN — BACLOFEN 10 MG: 10 TABLET ORAL at 08:30

## 2023-01-17 RX ADMIN — INSULIN LISPRO 3 UNITS: 100 INJECTION, SOLUTION INTRAVENOUS; SUBCUTANEOUS at 11:58

## 2023-01-17 RX ADMIN — INSULIN LISPRO 14 UNITS: 100 INJECTION, SOLUTION INTRAVENOUS; SUBCUTANEOUS at 11:58

## 2023-01-17 RX ADMIN — LISINOPRIL 2.5 MG: 2.5 TABLET ORAL at 08:30

## 2023-01-17 RX ADMIN — SODIUM CHLORIDE: 9 INJECTION, SOLUTION INTRAVENOUS at 08:41

## 2023-01-17 RX ADMIN — PIPERACILLIN AND TAZOBACTAM 3375 MG: 3; .375 INJECTION, POWDER, FOR SOLUTION INTRAVENOUS at 08:42

## 2023-01-17 RX ADMIN — FUROSEMIDE 20 MG: 20 TABLET ORAL at 08:30

## 2023-01-17 RX ADMIN — INSULIN LISPRO 2 UNITS: 100 INJECTION, SOLUTION INTRAVENOUS; SUBCUTANEOUS at 00:45

## 2023-01-17 RX ADMIN — LEVOTHYROXINE SODIUM 125 MCG: 0.12 TABLET ORAL at 05:06

## 2023-01-17 RX ADMIN — INSULIN LISPRO 1 UNITS: 100 INJECTION, SOLUTION INTRAVENOUS; SUBCUTANEOUS at 05:07

## 2023-01-17 RX ADMIN — RISPERIDONE 0.25 MG: 0.25 TABLET, FILM COATED ORAL at 08:32

## 2023-01-17 RX ADMIN — SERTRALINE 100 MG: 100 TABLET, FILM COATED ORAL at 08:30

## 2023-01-17 RX ADMIN — MEMANTINE HYDROCHLORIDE 10 MG: 10 TABLET, FILM COATED ORAL at 08:30

## 2023-01-17 RX ADMIN — INSULIN LISPRO 2 UNITS: 100 INJECTION, SOLUTION INTRAVENOUS; SUBCUTANEOUS at 08:38

## 2023-01-17 RX ADMIN — SODIUM CHLORIDE, PRESERVATIVE FREE 10 ML: 5 INJECTION INTRAVENOUS at 08:38

## 2023-01-17 RX ADMIN — CARVEDILOL 3.12 MG: 3.12 TABLET, FILM COATED ORAL at 08:30

## 2023-01-17 RX ADMIN — ASPIRIN 81 MG: 81 TABLET, COATED ORAL at 08:30

## 2023-01-17 RX ADMIN — SODIUM CHLORIDE 40 MG: 9 INJECTION, SOLUTION INTRAMUSCULAR; INTRAVENOUS; SUBCUTANEOUS at 05:06

## 2023-01-17 ASSESSMENT — PAIN SCALES - GENERAL: PAINLEVEL_OUTOF10: 0

## 2023-01-17 NOTE — DISCHARGE SUMMARY
[facility] Physician Discharge Summary       Mireille Anthony MD  [address] [facility]  [phone]    Follow up      Kitty Caldwell, [address]  [phone]    Call        Activity level: As tolerated     Dispo: Home      Condition on discharge: Stable     Patient ID:  Paige Godfrey  [id number]  74 y.o.  1955    Admit date: 1/13/2023    Discharge date and time:  1/17/2023  1:14 PM    Admission Diagnoses: Principal Problem:    Intractable nausea and vomiting  Active Problems:    Diabetes mellitus ([address]    Hypertension    Depression    Hypothyroidism  Resolved Problems:    * No resolved hospital problems. *      Discharge Diagnoses: Principal Problem:    Intractable nausea and vomiting  Active Problems:    Diabetes mellitus ([address]    Hypertension    Depression    Hypothyroidism  Resolved Problems:    * No resolved hospital problems. *      Consults:  IP CONSULT TO GI  IP CONSULT TO UROLOGY    Procedures: ERCP WITH STENT REMOVAL AND STONE EXTRACTION    Hospital Course:   Patient Paige Godfrey is a 74 y.o. presented with Intractable nausea and vomiting [R62]    74year old male presents with abdominal pain. He was found to have transaminitis and jaundice. He did have previous CBD stent. Patient was seen by GI and had ERCP with stent removal and stone extraction. Patient is tolerating diet. Currently medically stable for discharge.      Discharge Exam:    General Appearance: alert and oriented to person, place and time and in no acute distress  Skin: warm and dry  Head: normocephalic and atraumatic  Eyes: pupils equal, round, and reactive to light, extraocular eye movements intact, conjunctivae normal  Neck: neck supple and non tender without mass   Pulmonary/Chest: clear to auscultation bilaterally- no wheezes, rales or rhonchi, normal air movement, no respiratory distress  Cardiovascular: normal rate, normal S1 and S2 and no carotid bruits  Abdomen: soft, non-tender, non-distended, normal bowel sounds, no masses or organomegaly  Extremities: no cyanosis, no clubbing and no edema  Neurologic: no cranial nerve deficit and speech normal    I/O last 3 completed shifts: In: 400 [I.V.:400]  Out: -   I/O this shift:  In: 360 [P.O.:360]  Out: -       LABS:  Recent Labs     01/15/23  0311 01/16/23  0325 01/17/23  0233    138 139   K 3.6 3.6 3.6    101 102   CO2 24 25 24   BUN 16 12 16   CREATININE 1.3* 1.2 1.2   GLUCOSE 206* 194* 225*   CALCIUM 8.9 9.1 8.9       Recent Labs     01/15/23  0311 01/16/23  0325 01/17/23  0233   WBC 3.5* 4.1* 3.6*   RBC 3.81 3.90 3.91   HGB 10.5* 10.8* 11.0*   HCT 31.6* 32.4* 32.9*   MCV 82.9 83.1 84.1   MCH 27.6 27.7 28.1   MCHC 33.2 33.3 33.4   RDW 13.2 13.5 13.1    175 185   MPV 10.4 10.0 10.1       No results for input(s): POCGLU in the last 72 hours. Imaging:  CT ABDOMEN PELVIS WO CONTRAST Additional Contrast? None    Result Date: 1/14/2023  EXAMINATION: CT OF THE ABDOMEN AND PELVIS WITHOUT CONTRAST 1/14/2023 2:14 pm TECHNIQUE: CT of the abdomen and pelvis was performed without the administration of intravenous contrast. Multiplanar reformatted images are provided for review. Automated exposure control, iterative reconstruction, and/or weight based adjustment of the mA/kV was utilized to reduce the radiation dose to as low as reasonably achievable. COMPARISON: None. HISTORY: ORDERING SYSTEM PROVIDED HISTORY: elevated lft TECHNOLOGIST PROVIDED HISTORY: Reason for exam:->elevated lft Additional Contrast?->None FINDINGS: Lower Chest: Lung bases reveal opacifications at the right lung base largely reticular however somewhat confluent intermixed atelectasis or minimal airspace disease not excluded without pleural effusion. Organs: Limited evaluation of solid organs due to lack of intravenous contrast.  Liver without focal liver lesion.   Pneumobilia present status post cholecystectomy may be from prior instrumentation with a biliary stent in place terminating within the duodenum. Pancreas is grossly unremarkable without acute inflammation or distal pancreatic tail atrophy. Spleen is enlarged up to 15 cm in craniocaudal dimension. Adrenals without nodule. Kidneys without suspicious renal lesion and no hydronephrosis. Left intrarenal stone of nonobstructing left nephrolithiasis. GI/Bowel: Small hiatal hernia. No focal thickening or disproportion dilatation of bowel. No inflammatory findings. Redundant rectosigmoid colon with sigmoid diverticulosis however no evidence for acute diverticulitis. Pelvis: No suspicious pelvic lesion or bulky pelvic adenopathy/free fluid. Mild prostatomegaly with calcifications. Peritoneum/Retroperitoneum: No bulky retroperitoneal adenopathy. No suspicious peritoneal or mesenteric process Vasculature: Grossly normal caliber of abdominal aorta and vasculature Bones/Soft Tissues: No acute osseous or soft tissue findings. Fat containing left direct inguinal hernia. Limited evaluation of solid organs due to lack of intravenous contrast.  No focal liver lesion or contour irregularity. No perihepatic ascites or gross low signal associations when compared to the spleen Pneumobilia present with biliary stent in place status post cholecystectomy. Nonobstructing left nephrolithiasis Splenomegaly     US ABDOMEN LIMITED    Result Date: 1/14/2023  EXAMINATION: RIGHT UPPER QUADRANT ULTRASOUND 1/14/2023 6:00 am COMPARISON: CT abdomen pelvis same date HISTORY: ORDERING SYSTEM PROVIDED HISTORY: biliary stent, mild pneumobilia on CT scan TECHNOLOGIST PROVIDED HISTORY: Reason for exam:->biliary stent, mild pneumobilia on CT scan What reading provider will be dictating this exam?->CRC FINDINGS: LIVER:  Mildly increased hepatic parenchymal echogenicity. Hepatopetal flow in the main portal vein. BILIARY SYSTEM:  Status post cholecystectomy.   Small agent foci in the liver may reflect the pneumobilia seen on comparison CT. Common bile duct is within normal limits measuring 2 mm at the tom hepaticus by technologist measurements. Extrahepatic biliary tree and biliary stent are however better depicted on comparison CT. RIGHT KIDNEY: The right kidney is grossly unremarkable without evidence of hydronephrosis. PANCREAS:  Not well seen. OTHER: No evidence of right upper quadrant ascites. Pneumobilia is again seen, and is not unexpected in the setting of a biliary stent. The extrahepatic biliary tree is better depicted on the comparison CT; refer to that exam for further evaluation. Patient Instructions:      Medication List        START taking these medications      amoxicillin-clavulanate 875-125 MG per tablet  Commonly known as: AUGMENTIN  Take 1 tablet by mouth 2 times daily for 4 days            CONTINUE taking these medications      aspirin 81 MG EC tablet     atorvastatin 20 MG tablet  Commonly known as: LIPITOR     baclofen 10 MG tablet  Commonly known as: LIORESAL     carvedilol 6.25 MG tablet  Commonly known as: COREG     furosemide 20 MG tablet  Commonly known as: LASIX     Lantus 100 UNIT/ML injection vial  Generic drug: insulin glargine     levothyroxine 125 MCG tablet  Commonly known as: SYNTHROID     lisinopril 2.5 MG tablet  Commonly known as: PRINIVIL;ZESTRIL     Namenda 10 MG tablet  Generic drug: memantine     NovoLOG 100 UNIT/ML injection vial  Generic drug: insulin aspart     risperiDONE 0.25 MG tablet  Commonly known as: RISPERDAL     * sertraline 50 MG tablet  Commonly known as: ZOLOFT     * sertraline 100 MG tablet  Commonly known as: ZOLOFT           * This list has 2 medication(s) that are the same as other medications prescribed for you. Read the directions carefully, and ask your doctor or other care provider to review them with you.                    Where to Get Your Medications        You can get these medications from any pharmacy    Bring a paper prescription for each of these medications  amoxicillin-clavulanate 875-125 MG per tablet           Note that 36 minutes was spent in preparing discharge papers, discussing discharge with patient, medication review, etc.    Signed:  Electronically signed by Krzysztof Cornejo DO on 1/17/2023 at 1:14 PM

## 2023-01-17 NOTE — PROGRESS NOTES
PROGRESS NOTE        Patient Presents with/Seen in Consultation For      Reason for Consult: intractable N/V, pneumobilia on CT scan, stent in common bile duct     CHIEF COMPLAINT:  nausea with vomiting x 3 days    Subjective:     Patient seen sitting in bed has no abdominal pain or nausea. Tolerating diet. Discussed ERCP at length with patient and wife all additional questions and concerns addressed. Review of Systems  Aside from what was mentioned in the PMH and HPI, essentially unremarkable, all others negative. Objective:     BP (!) 151/72   Pulse 63   Temp 97.8 °F (36.6 °C) (Oral)   Resp 16   Ht 6' 2\" (1.88 m)   Wt 235 lb (106.6 kg)   SpO2 95%   BMI 30.17 kg/m²     General appearance: alert, awake, laying in bed, and cooperative  Eyes: conjunctivae/corneas clear. PERRL.   Lungs: clear/ diminished to auscultation bilaterally  Heart: regular rate and rhythm, no murmur, 2+ pulses;  trace edema at bilat ankles  Abdomen: soft, non-tender; bowel sounds normal; no masses,  no organomegaly  Extremities: trace edema  Pulses: 2+ and symmetric  Skin: Skin color normal, texture, turgor normal.   Neurologic: Grossly normal    insulin lispro (HUMALOG) injection vial 14 Units, TID WC  insulin lispro (HUMALOG) injection vial 0-4 Units, TID WC  insulin lispro (HUMALOG) injection vial 0-4 Units, Nightly  labetalol (NORMODYNE;TRANDATE) injection 10 mg, Q4H PRN  HYDROmorphone (DILAUDID) injection 1 mg, Q4H PRN  ondansetron (ZOFRAN) injection 4 mg, Q6H PRN  glucose chewable tablet 16 g, PRN  dextrose bolus 10% 125 mL, PRN   Or  dextrose bolus 10% 250 mL, PRN  glucagon (rDNA) injection 1 mg, PRN  dextrose 10 % infusion, Continuous PRN  indomethacin (INDOCIN) 50 MG suppository 100 mg, 60 Min Pre-Op  pantoprazole (PROTONIX) 40 mg in sodium chloride (PF) 0.9 % 10 mL injection, Daily  piperacillin-tazobactam (ZOSYN) 3,375 mg in dextrose 5 % 50 mL IVPB extended infusion (mini-bag), Q8H  trimethobenzamide (TIGAN) injection 200 mg, Q6H PRN  aspirin EC tablet 81 mg, Daily  atorvastatin (LIPITOR) tablet 20 mg, Daily  baclofen (LIORESAL) tablet 10 mg, BID  carvedilol (COREG) tablet 3.125 mg, BID  furosemide (LASIX) tablet 20 mg, Daily  levothyroxine (SYNTHROID) tablet 125 mcg, Daily  lisinopril (PRINIVIL;ZESTRIL) tablet 2.5 mg, Daily  memantine (NAMENDA) tablet 10 mg, BID  risperiDONE (RISPERDAL) tablet 0.25 mg, BID  sertraline (ZOLOFT) tablet 100 mg, Daily  sertraline (ZOLOFT) tablet 50 mg, QHS  sodium chloride flush 0.9 % injection 5-40 mL, 2 times per day  sodium chloride flush 0.9 % injection 5-40 mL, PRN  0.9 % sodium chloride infusion, PRN  acetaminophen (TYLENOL) tablet 650 mg, Q6H PRN   Or  acetaminophen (TYLENOL) suppository 650 mg, Q6H PRN  polyethylene glycol (GLYCOLAX) packet 17 g, Daily PRN       Data Review  CBC:   Lab Results   Component Value Date/Time    WBC 3.6 01/17/2023 02:33 AM    RBC 3.91 01/17/2023 02:33 AM    HGB 11.0 01/17/2023 02:33 AM    HCT 32.9 01/17/2023 02:33 AM    MCV 84.1 01/17/2023 02:33 AM    MCH 28.1 01/17/2023 02:33 AM    MCHC 33.4 01/17/2023 02:33 AM    RDW 13.1 01/17/2023 02:33 AM     01/17/2023 02:33 AM    MPV 10.1 01/17/2023 02:33 AM     CMP:    Lab Results   Component Value Date/Time     01/17/2023 02:33 AM    K 3.6 01/17/2023 02:33 AM    K 3.7 01/14/2023 01:40 AM     01/17/2023 02:33 AM    CO2 24 01/17/2023 02:33 AM    BUN 16 01/17/2023 02:33 AM    CREATININE 1.2 01/17/2023 02:33 AM    LABGLOM >60 01/17/2023 02:33 AM    GLUCOSE 225 01/17/2023 02:33 AM    PROT 5.9 01/17/2023 02:33 AM    LABALBU 3.2 01/17/2023 02:33 AM    CALCIUM 8.9 01/17/2023 02:33 AM    BILITOT 1.9 01/17/2023 02:33 AM    ALKPHOS 455 01/17/2023 02:33 AM     01/17/2023 02:33 AM     01/17/2023 02:33 AM     Hepatic Function Panel:    Lab Results   Component Value Date/Time    ALKPHOS 455 01/17/2023 02:33 AM     01/17/2023 02:33 AM     01/17/2023 02:33 AM    PROT 5.9 01/17/2023 02:33 AM BILITOT 1.9 01/17/2023 02:33 AM    BILIDIR 2.6 01/14/2023 12:51 PM    IBILI 1.0 01/14/2023 12:51 PM    LABALBU 3.2 01/17/2023 02:33 AM       PT/INR:    Lab Results   Component Value Date/Time    PROTIME 12.9 01/16/2023 03:25 AM    INR 1.1 01/16/2023 03:25 AM       Assessment:     Active Problems:  CBD stent- per CT completed at [facility]  Nausea with vomiting  Jaundiced  Elevated LFTs  Epigastric pain  Anemia, normocytic   ROCKY  HX of liver abscess 2014  Colon polyps in 2014  ERCP with stent removal and stone extraction 1/16/23- Markedly friable old stent with biliary debride deposits as well as calcification. The stent was removed carefully using a polypectomy snare. Common bile duct was cannulated and balloon cholangiography and sweeping resulted in two small stones removed. Excellent drainage was obtained. I did not feel a need to replace the stent. Plan:   Increase diet   IVF and pain management per Primary care  Continue to medicate for nausea as ordered  Continue Protonix as ordered  Monitor CBC, CMP and lipase daily while in patient  Supportive care  Discharge per PCP if tolerating diet, ok from GI POV with OP follow up visit. Script printed and put into paper chart and to be given to patient upon discharge        Note: This report was completed utilizing computer voice recognition software. Every effort has been made to ensure accuracy, however; inadvertent computerized transcription errors may be present.      Discussed with Dr. Chiquita Gomez per Dr. Sherri WALTERS 1/17/2023  12:41 PM For Dr. Jony Thomason

## 2023-01-17 NOTE — PATIENT CARE CONFERENCE
[facility] Quality Flow/Interdisciplinary Rounds Progress Note        Quality Flow Rounds held on January 17, 2023    Disciplines Attending:  Bedside Nurse, , , and Nursing Unit Leadership    Radames Jarquin was admitted on 1/13/2023  9:47 PM    Anticipated Discharge Date:       Disposition:    Antoni Score:  Antoni Scale Score: 21    Readmission Risk              Risk of Unplanned Readmission:  9           Discussed patient goal for the day, patient clinical progression, and barriers to discharge.   The following Goal(s) of the Day/Commitment(s) have been identified:  Diagnostics - Report Results      Sujey Scales RN  January 17, 2023

## 2023-01-17 NOTE — OP NOTE
[address] [address]                                OPERATIVE REPORT    PATIENT NAME: Jesús Ramey                     :        1955  MED REC NO:   [id number]                            ROOM:       0515  ACCOUNT NO:   [de-identified]                           ADMIT DATE: 2023  PROVIDER:     Alexsander López MD    DATE OF PROCEDURE:  2023    PROCEDURE PERFORMED:  ERCP with stent removal and stone extraction. PREOPERATIVE DIAGNOSES:  This 70-year-old gentleman had an ERCP in [facility] with papillotomy and plastic stent placement in . I am  unsure of the provider who did that, but apparently no followup after  the stent placement. Stent has been placed since  according to the  patient's wife, now presents to the hospital with elevated liver  enzymes, nausea, and right upper quadrant pain. Procedure is done for  evaluation. POSTOPERATIVE DIAGNOSES:  Markedly friable old stent with biliary  debride deposits _____ as well as calcification. The stent was removed  carefully using a polypectomy snare. Common bile duct was cannulated  and balloon cholangiography and sweeping resulted in two small stones  removed. Excellent drainage was obtained. I did not feel a need to  replace the stent. SURGEON:  Alexsander López M.D. ANESTHESIA:  LMAC. DESCRIPTION OF PROCEDURE:  With the patient in his left lateral  decubitus position, the Olympus side-viewing video duodenoscope was  introduced into the esophagus and advanced through the GE junction into  the gastric body and advanced through the pylorus into duodenal bulb and  second portion of the duodenum, where a very old friable stent with  biliary debris or around it was found protruding from the entire  diverticular papilla. Carefully a polypectomy snare was wrapped around  it and the stent and the scope were removed.   The scope was then  reintroduced and papilla was positioned and common bile duct was  cannulated using a papillotome, deeply cannulated with a guidewire. Over the guidewire, the papillotome was then exchanged with #9 to  #12-Yoruba balloon and balloon cholangiography showed few filling  defects with minimal dilatation of the common bile duct. Balloon  sweeping resulted in large amount of air bubbles and two small stones  were recovered. Multiple balloon sweepings were repeated. Excellent  drainage was obtained. The scope was retrieved, area decompressed, and  procedure was terminated. The patient tolerated the procedure well.         Deneen Lin MD    D: 01/16/2023 16:19:36       T: 01/16/2023 16:23:28     [email or web address]  Job#: [id number]     Doc#: [id number]    CC:  Braden Montalvo MD

## 2023-01-19 LAB
BLOOD CULTURE, ROUTINE: NORMAL
CULTURE, BLOOD 2: NORMAL

## 2023-02-21 ENCOUNTER — LAB (OUTPATIENT)
Dept: LAB | Facility: HOSPITAL | Age: 68
End: 2023-02-21
Payer: COMMERCIAL

## 2023-02-21 DIAGNOSIS — R94.6 ABNORMAL THYROID FUNCTION TEST: ICD-10-CM

## 2023-02-21 DIAGNOSIS — Z79.4 TYPE 2 DIABETES MELLITUS WITH HYPERGLYCEMIA, WITH LONG-TERM CURRENT USE OF INSULIN: Primary | ICD-10-CM

## 2023-02-21 DIAGNOSIS — I10 ESSENTIAL HYPERTENSION: ICD-10-CM

## 2023-02-21 DIAGNOSIS — Z79.4 TYPE 2 DIABETES MELLITUS WITH HYPERGLYCEMIA, WITH LONG-TERM CURRENT USE OF INSULIN: ICD-10-CM

## 2023-02-21 DIAGNOSIS — E78.2 MIXED HYPERLIPIDEMIA: ICD-10-CM

## 2023-02-21 DIAGNOSIS — E11.65 TYPE 2 DIABETES MELLITUS WITH HYPERGLYCEMIA, WITH LONG-TERM CURRENT USE OF INSULIN: ICD-10-CM

## 2023-02-21 DIAGNOSIS — E11.65 TYPE 2 DIABETES MELLITUS WITH HYPERGLYCEMIA, WITH LONG-TERM CURRENT USE OF INSULIN: Primary | ICD-10-CM

## 2023-02-21 LAB
ALBUMIN SERPL-MCNC: 5 G/DL (ref 3.5–5.2)
ALBUMIN UR-MCNC: <1.2 MG/DL
ALBUMIN/GLOB SERPL: 1.7 G/DL
ALP SERPL-CCNC: 76 U/L (ref 39–117)
ALT SERPL W P-5'-P-CCNC: 56 U/L (ref 1–41)
ANION GAP SERPL CALCULATED.3IONS-SCNC: 8.8 MMOL/L (ref 5–15)
AST SERPL-CCNC: 46 U/L (ref 1–40)
BILIRUB SERPL-MCNC: 0.4 MG/DL (ref 0–1.2)
BUN SERPL-MCNC: 23 MG/DL (ref 8–23)
BUN/CREAT SERPL: 20.9 (ref 7–25)
CALCIUM SPEC-SCNC: 10.1 MG/DL (ref 8.6–10.5)
CHLORIDE SERPL-SCNC: 102 MMOL/L (ref 98–107)
CHOLEST SERPL-MCNC: 171 MG/DL (ref 0–200)
CO2 SERPL-SCNC: 27.2 MMOL/L (ref 22–29)
CREAT SERPL-MCNC: 1.1 MG/DL (ref 0.76–1.27)
CREAT UR-MCNC: 87.9 MG/DL
EGFRCR SERPLBLD CKD-EPI 2021: 74.5 ML/MIN/1.73
GLOBULIN UR ELPH-MCNC: 3 GM/DL
GLUCOSE SERPL-MCNC: 126 MG/DL (ref 65–99)
HBA1C MFR BLD: 8.8 % (ref 3.5–5.6)
HDLC SERPL-MCNC: 27 MG/DL (ref 40–60)
LDLC SERPL CALC-MCNC: 115 MG/DL (ref 0–100)
LDLC/HDLC SERPL: 4.13 {RATIO}
MICROALBUMIN/CREAT UR: NORMAL MG/G{CREAT}
POTASSIUM SERPL-SCNC: 3.8 MMOL/L (ref 3.5–5.2)
PROT SERPL-MCNC: 8 G/DL (ref 6–8.5)
SODIUM SERPL-SCNC: 138 MMOL/L (ref 136–145)
T4 FREE SERPL-MCNC: 0.93 NG/DL (ref 0.93–1.7)
TRIGL SERPL-MCNC: 163 MG/DL (ref 0–150)
TSH SERPL DL<=0.05 MIU/L-ACNC: 3.35 UIU/ML (ref 0.27–4.2)
VLDLC SERPL-MCNC: 29 MG/DL (ref 5–40)

## 2023-02-21 PROCEDURE — 36415 COLL VENOUS BLD VENIPUNCTURE: CPT

## 2023-02-21 PROCEDURE — 84439 ASSAY OF FREE THYROXINE: CPT

## 2023-02-21 PROCEDURE — 84443 ASSAY THYROID STIM HORMONE: CPT

## 2023-02-21 PROCEDURE — 80053 COMPREHEN METABOLIC PANEL: CPT

## 2023-02-21 PROCEDURE — 83036 HEMOGLOBIN GLYCOSYLATED A1C: CPT

## 2023-02-21 PROCEDURE — 86376 MICROSOMAL ANTIBODY EACH: CPT

## 2023-02-21 PROCEDURE — 82043 UR ALBUMIN QUANTITATIVE: CPT

## 2023-02-21 PROCEDURE — 80061 LIPID PANEL: CPT

## 2023-02-21 PROCEDURE — 82570 ASSAY OF URINE CREATININE: CPT

## 2023-02-21 RX ORDER — CEPHALEXIN 500 MG/1
1 CAPSULE ORAL EVERY 12 HOURS SCHEDULED
COMMUNITY
Start: 2023-01-18

## 2023-02-21 RX ORDER — DEXAMETHASONE 4 MG/1
TABLET ORAL
COMMUNITY
Start: 2022-11-18

## 2023-02-21 RX ORDER — CEFDINIR 300 MG/1
1 CAPSULE ORAL EVERY 12 HOURS SCHEDULED
COMMUNITY
Start: 2022-11-18

## 2023-02-22 LAB — THYROPEROXIDASE AB SERPL-ACNC: <9 IU/ML (ref 0–34)

## 2023-02-24 ENCOUNTER — DOCUMENTATION (OUTPATIENT)
Dept: ENDOCRINOLOGY | Facility: CLINIC | Age: 68
End: 2023-02-24
Payer: COMMERCIAL

## 2023-02-24 NOTE — PROGRESS NOTES
Benefits Investigation Summary    Prescription: Renewal    Dispensing pharmacy: [facility]    Copay amount: Farxiga-$1,632.78/90 day  Tresiba-$30.00/30 day  Ozempic-$901.18/30 day    PLAN: [employer]  BIN: [id number]  PCN: [id number]  RX GROUP:     Prior Auth and Med Assistance notes: none    Yahaira Olivares CPhT

## 2023-02-28 ENCOUNTER — OFFICE VISIT (OUTPATIENT)
Dept: ENDOCRINOLOGY | Facility: CLINIC | Age: 68
End: 2023-02-28
Payer: COMMERCIAL

## 2023-02-28 VITALS
SYSTOLIC BLOOD PRESSURE: 110 MMHG | BODY MASS INDEX: 33.15 KG/M2 | HEIGHT: 65 IN | WEIGHT: 199 LBS | HEART RATE: 77 BPM | DIASTOLIC BLOOD PRESSURE: 70 MMHG | OXYGEN SATURATION: 95 %

## 2023-02-28 DIAGNOSIS — E11.42 DIABETIC PERIPHERAL NEUROPATHY: ICD-10-CM

## 2023-02-28 DIAGNOSIS — E66.09 CLASS 1 OBESITY DUE TO EXCESS CALORIES WITHOUT SERIOUS COMORBIDITY WITH BODY MASS INDEX (BMI) OF 34.0 TO 34.9 IN ADULT: ICD-10-CM

## 2023-02-28 DIAGNOSIS — E78.2 MIXED HYPERLIPIDEMIA: ICD-10-CM

## 2023-02-28 DIAGNOSIS — I10 ESSENTIAL HYPERTENSION: ICD-10-CM

## 2023-02-28 DIAGNOSIS — E11.65 TYPE 2 DIABETES MELLITUS WITH HYPERGLYCEMIA, WITH LONG-TERM CURRENT USE OF INSULIN: Primary | ICD-10-CM

## 2023-02-28 DIAGNOSIS — Z79.4 TYPE 2 DIABETES MELLITUS WITH HYPERGLYCEMIA, WITH LONG-TERM CURRENT USE OF INSULIN: Primary | ICD-10-CM

## 2023-02-28 LAB — GLUCOSE BLDC GLUCOMTR-MCNC: 66 MG/DL (ref 70–105)

## 2023-02-28 PROCEDURE — 99214 OFFICE O/P EST MOD 30 MIN: CPT | Performed by: INTERNAL MEDICINE

## 2023-02-28 PROCEDURE — 82962 GLUCOSE BLOOD TEST: CPT | Performed by: INTERNAL MEDICINE

## 2023-02-28 RX ORDER — TIRZEPATIDE 5 MG/.5ML
5 INJECTION, SOLUTION SUBCUTANEOUS WEEKLY
Qty: 2 ML | Refills: 5 | Status: SHIPPED | OUTPATIENT
Start: 2023-02-28 | End: 2023-03-14

## 2023-02-28 RX ORDER — ROSUVASTATIN CALCIUM 10 MG/1
10 TABLET, COATED ORAL NIGHTLY
Qty: 30 TABLET | Refills: 11 | Status: SHIPPED | OUTPATIENT
Start: 2023-02-28 | End: 2023-03-22

## 2023-02-28 NOTE — PATIENT INSTRUCTIONS
DC Ozempic and Victoza  Start Mounjaro 5 mg subcu weekly  DC simvastatin and start Crestor 10 mg p.o. daily  Continue rest of the medications  Continue to work on your diet and activity  Always keep glucose source in case of low blood sugar  Annual eye exam  Labs before follow-up.

## 2023-02-28 NOTE — PROGRESS NOTES
Endocrine Progress Note Outpatient     Patient Care Team:  Caesar Limon MD as PCP - General (Family Medicine)    Chief Complaint: Follow-up type 2 diabetes    HPI: 67-year-old male with history of type 2 diabetes, hypertension, hyperlipidemia is here for follow-up.    For type 2 diabetes: He is currently on Farxiga 10 once a day and Tresiba 120 units subcu daily, victoza 1.8 mg sq weekly. He is tolerating it well.  He is working on his diet. He had to stop Metformin due to GI side effects. He was on extended release Metformin. Denies low blood sugars.  He did have diabetes education back in [place] in . He tells me he has been having high blood sugars.     Hypertension: Well-controlled.    Hyperlipidemia: On simvastatin.    Diabetic peripheral neuropathy: On vitamin D and B12 supplementation.    Obesity: He is advised to work on diet.       Past Medical History:   Diagnosis Date   • Angina pectoris (HCC)    • Diabetes mellitus (HCC)     type 2   • Hyperlipidemia    • Hypertension    • Type 2 diabetes mellitus (HCC)        Social History     Socioeconomic History   • Marital status:      Spouse name: Odette   • Number of children: 0   • Years of education: 14   Tobacco Use   • Smoking status: Former     Types: Cigarettes     Quit date:      Years since quittin.1   • Smokeless tobacco: Never   Vaping Use   • Vaping Use: Never used   Substance and Sexual Activity   • Alcohol use: No   • Drug use: No   • Sexual activity: Defer       Family History   Problem Relation Age of Onset   • Cancer Father    • Diabetes Brother    • Hyperlipidemia Mother    • Diabetes Maternal Uncle        No Known Allergies    ROS:   Constitutional:  Denies fatigue, tiredness.    Eyes:  Denies change in visual acuity   HENT:  Denies nasal congestion or sore throat   Respiratory: denies cough, shortness of breath.   Cardiovascular:  denies chest pain, edema   GI:  Denies abdominal pain, nausea, vomiting.   Musculoskeletal:   Denies back pain or joint pain   Integument:  Denies dry skin and rash   Neurologic:  Denies headache, focal weakness or sensory changes   Endocrine:  Denies polyuria or polydipsia   Psychiatric:  Denies depression or anxiety      Vitals:    02/28/23 0748   BP: 110/70   Pulse: 77   SpO2: 95%     BMI: 33.1    Physical Exam:  GEN: NAD, conversant, Obese  EYES: EOMI, PERRL, no conjunctival erythema  NECK: no thyromegaly, full ROM   CV: RRR, no murmurs/rubs/gallops, no peripheral edema  LUNG: CTAB, no wheezes/rales/ronchi  SKIN: no rashes, no acanthosis  MSK: no deformities, full ROM of all extremities  NEURO: no tremors, DTR normal  PSYCH: AOX3, appropriate mood, affect normal      Results Review:     I reviewed the patient's new clinical results.    Lab Results   Component Value Date    HGBA1C 8.8 (H) 02/21/2023    HGBA1C 7.2 (H) 08/23/2022    HGBA1C 8.4 (H) 07/05/2022      Lab Results   Component Value Date    GLUCOSE 126 (H) 02/21/2023    BUN 23 02/21/2023    CREATININE 1.10 02/21/2023    EGFRIFNONA 71 09/20/2021    BCR 20.9 02/21/2023    K 3.8 02/21/2023    CO2 27.2 02/21/2023    CALCIUM 10.1 02/21/2023    ALBUMIN 5.0 02/21/2023    LABIL2 1.4 03/14/2019    AST 46 (H) 02/21/2023    ALT 56 (H) 02/21/2023    CHOL 171 02/21/2023    TRIG 163 (H) 02/21/2023     (H) 02/21/2023    HDL 27 (L) 02/21/2023     Lab Results   Component Value Date    TSH 3.350 02/21/2023    FREET4 0.93 02/21/2023    THYROIDAB <9 02/21/2023         Medication Review: Reviewed.       Current Outpatient Medications:   •  cefdinir (OMNICEF) 300 MG capsule, Take 1 capsule by mouth Every 12 (Twelve) Hours., Disp: , Rfl:   •  cephalexin (KEFLEX) 500 MG capsule, Take 1 capsule by mouth Every 12 (Twelve) Hours., Disp: , Rfl:   •  Cyanocobalamin (VITAMIN B-12) 1000 MCG sublingual tablet, Place 1,000 mcg under the tongue Daily., Disp: 100 each, Rfl: 4  •  dapagliflozin Propanediol (Farxiga) 10 MG tablet, Take 10 mg by mouth Daily. Take 1 tablet  (10mg) by mouth daily., Disp: 90 tablet, Rfl: 4  •  dexamethasone (DECADRON) 4 MG tablet, TAKE 1 TABLET BY MOUTH DAILY FOR 4 DAYS, Disp: , Rfl:   •  enalapril (VASOTEC) 20 MG tablet, Take 1 tablet by mouth Daily., Disp: 90 tablet, Rfl: 4  •  icosapent ethyl (VASCEPA) 1 g capsule capsule, Take 2 g by mouth 2 (Two) Times a Day With Meals., Disp: 360 capsule, Rfl: 4  •  Insulin Degludec (Tresiba FlexTouch) 200 UNIT/ML solution pen-injector pen injection, Inject 120 Units under the skin into the appropriate area as directed Daily., Disp: 66 pen, Rfl: 3  •  omeprazole (priLOSEC) 20 MG capsule, Take 1 capsule by mouth Daily. before a meal, Disp: 30 capsule, Rfl: 5  •  Semaglutide, 2 MG/DOSE, (Ozempic, 2 MG/DOSE,) 8 MG/3ML solution pen-injector, Inject 2 mg under the skin into the appropriate area as directed 1 (One) Time Per Week., Disp: 9 mL, Rfl: 4  •  simvastatin (ZOCOR) 40 MG tablet, Take 1 tablet by mouth Every Night., Disp: 90 tablet, Rfl: 4  •  tamsulosin (FLOMAX) 0.4 MG capsule 24 hr capsule, Take 1 capsule by mouth Daily., Disp: , Rfl:   •  topiramate (TOPAMAX) 100 MG tablet, Take 1 tablet by mouth 2 (Two) Times a Day., Disp: , Rfl:       Assessment & Plan   1.  Diabetes mellitus type 2 with Hyperglycemia: Uncontrolled with worsening of hyperglycemia with A1c at 8.8%.  Unfortunately not able to get Ozempic, will DC Ozempic and Victoza and add Mounjaro 5 mg subcu weekly and continue Tresiba with Farxiga.  He is advised to continue to work on diet and activity and always keep glucose source in case of low blood sugars and recommend annual eye exam.      2.  Hypertension: Well-controlled, continue current medication    3.  Hyperlipidemia: Uncontrolled with high LDL, will DC simvastatin and add Crestor 10 mg p.o. daily.    4.  Diabetic peripheral neuropathy: Improving.    5.  Abnormal thyroid test: Has mild high TSH, repeat TSH with free T4 and TPO antibodies normal.    Assessment and plan from July 12, 2022 reviewed  and updated.            Sheila Morales MD FACE.

## 2023-03-03 RX ORDER — INSULIN DEGLUDEC 200 U/ML
120 INJECTION, SOLUTION SUBCUTANEOUS DAILY
Qty: 45 ML | Refills: 4 | Status: SHIPPED | OUTPATIENT
Start: 2023-03-03

## 2023-03-09 RX ORDER — TIRZEPATIDE 5 MG/.5ML
5 INJECTION, SOLUTION SUBCUTANEOUS WEEKLY
Qty: 2 ML | Refills: 5 | OUTPATIENT
Start: 2023-03-09

## 2023-03-14 ENCOUNTER — TELEPHONE (OUTPATIENT)
Dept: ENDOCRINOLOGY | Facility: CLINIC | Age: 68
End: 2023-03-14
Payer: COMMERCIAL

## 2023-03-14 DIAGNOSIS — E11.65 TYPE 2 DIABETES MELLITUS WITH HYPERGLYCEMIA, WITH LONG-TERM CURRENT USE OF INSULIN: Primary | ICD-10-CM

## 2023-03-14 DIAGNOSIS — Z79.4 TYPE 2 DIABETES MELLITUS WITH HYPERGLYCEMIA, WITH LONG-TERM CURRENT USE OF INSULIN: Primary | ICD-10-CM

## 2023-03-14 RX ORDER — SEMAGLUTIDE 2.68 MG/ML
2 INJECTION, SOLUTION SUBCUTANEOUS WEEKLY
Qty: 9 ML | Refills: 4 | Status: SHIPPED | OUTPATIENT
Start: 2023-03-14

## 2023-03-14 NOTE — TELEPHONE ENCOUNTER
Pt called states mounjaro is on back order at his local pharmacies, wants to go on Ozempic since he found it at [facility]. Please advise if I should send the same dosage or different.

## 2023-03-14 NOTE — TELEPHONE ENCOUNTER
Sheila Morales MD  You 10 minutes ago (4:10 PM)       DC Mounjaro and add Ozempic 2 mg subcu weekly     Pt's wife notified,pt was unavailable.

## 2023-03-22 RX ORDER — ROSUVASTATIN CALCIUM 10 MG/1
TABLET, COATED ORAL
Qty: 30 TABLET | Refills: 11 | Status: SHIPPED | OUTPATIENT
Start: 2023-03-22

## 2023-03-29 ENCOUNTER — HOSPITAL ENCOUNTER (OUTPATIENT)
Dept: HOSPITAL 83 - LAB | Age: 68
Discharge: HOME | End: 2023-03-29
Attending: INTERNAL MEDICINE
Payer: COMMERCIAL

## 2023-03-29 DIAGNOSIS — E11.9: Primary | ICD-10-CM

## 2023-03-29 LAB
BUN SERPL-MCNC: 21 MG/DL (ref 9–23)
CHLORIDE SERPL-SCNC: 104 MMOL/L (ref 98–107)
CREAT UR-MCNC: 124.82 MG/DL
POTASSIUM SERPL-SCNC: 4.6 MMOL/L (ref 3.4–5.1)

## 2023-03-30 ENCOUNTER — HOSPITAL ENCOUNTER (OUTPATIENT)
Dept: HOSPITAL 83 - RESCLI | Age: 68
Discharge: HOME | End: 2023-03-30
Attending: STUDENT IN AN ORGANIZED HEALTH CARE EDUCATION/TRAINING PROGRAM
Payer: COMMERCIAL

## 2023-03-30 DIAGNOSIS — R06.2: Primary | ICD-10-CM

## 2023-04-05 ENCOUNTER — ON CAMPUS - OUTPATIENT (AMBULATORY)
Dept: URBAN - METROPOLITAN AREA HOSPITAL 2 | Facility: HOSPITAL | Age: 68
End: 2023-04-05

## 2023-04-05 ENCOUNTER — OFFICE (AMBULATORY)
Dept: URBAN - METROPOLITAN AREA PATHOLOGY 4 | Facility: PATHOLOGY | Age: 68
End: 2023-04-05

## 2023-04-05 VITALS
HEART RATE: 79 BPM | SYSTOLIC BLOOD PRESSURE: 125 MMHG | HEART RATE: 78 BPM | DIASTOLIC BLOOD PRESSURE: 91 MMHG | SYSTOLIC BLOOD PRESSURE: 128 MMHG | RESPIRATION RATE: 14 BRPM | HEIGHT: 65 IN | OXYGEN SATURATION: 98 % | DIASTOLIC BLOOD PRESSURE: 103 MMHG | OXYGEN SATURATION: 97 % | RESPIRATION RATE: 18 BRPM | RESPIRATION RATE: 16 BRPM | HEART RATE: 70 BPM | SYSTOLIC BLOOD PRESSURE: 135 MMHG | OXYGEN SATURATION: 96 % | HEART RATE: 87 BPM | OXYGEN SATURATION: 95 % | HEART RATE: 80 BPM | DIASTOLIC BLOOD PRESSURE: 101 MMHG | HEART RATE: 85 BPM | TEMPERATURE: 98 F | SYSTOLIC BLOOD PRESSURE: 122 MMHG | OXYGEN SATURATION: 99 % | SYSTOLIC BLOOD PRESSURE: 146 MMHG | HEART RATE: 90 BPM | SYSTOLIC BLOOD PRESSURE: 130 MMHG | SYSTOLIC BLOOD PRESSURE: 124 MMHG | DIASTOLIC BLOOD PRESSURE: 69 MMHG | DIASTOLIC BLOOD PRESSURE: 79 MMHG | DIASTOLIC BLOOD PRESSURE: 87 MMHG | SYSTOLIC BLOOD PRESSURE: 129 MMHG | SYSTOLIC BLOOD PRESSURE: 131 MMHG | DIASTOLIC BLOOD PRESSURE: 84 MMHG

## 2023-04-05 DIAGNOSIS — D12.3 BENIGN NEOPLASM OF TRANSVERSE COLON: ICD-10-CM

## 2023-04-05 DIAGNOSIS — Z86.010 PERSONAL HISTORY OF COLONIC POLYPS: ICD-10-CM

## 2023-04-05 DIAGNOSIS — D12.0 BENIGN NEOPLASM OF CECUM: ICD-10-CM

## 2023-04-05 PROBLEM — K63.5 POLYP OF COLON: Status: ACTIVE | Noted: 2023-04-05

## 2023-04-05 PROCEDURE — 88305 TISSUE EXAM BY PATHOLOGIST: CPT | Performed by: INTERNAL MEDICINE

## 2023-04-05 PROCEDURE — 45385 COLONOSCOPY W/LESION REMOVAL: CPT | Mod: 33 | Performed by: INTERNAL MEDICINE

## 2023-04-05 RX ADMIN — FAMOTIDINE 20 MG: 20 TABLET, FILM COATED ORAL at 14:05

## 2023-04-05 RX ADMIN — METOCLOPRAMIDE HYDROCHLORIDE 10 MG: 5 TABLET ORAL at 14:05

## 2023-05-23 ENCOUNTER — TRANSCRIBE ORDERS (OUTPATIENT)
Dept: ADMINISTRATIVE | Facility: HOSPITAL | Age: 68
End: 2023-05-23
Payer: COMMERCIAL

## 2023-05-23 ENCOUNTER — LAB (OUTPATIENT)
Dept: LAB | Facility: HOSPITAL | Age: 68
End: 2023-05-23
Payer: COMMERCIAL

## 2023-05-23 DIAGNOSIS — N13.8 ENLARGED PROSTATE WITH URINARY OBSTRUCTION: Primary | ICD-10-CM

## 2023-05-23 DIAGNOSIS — N40.1 ENLARGED PROSTATE WITH URINARY OBSTRUCTION: Primary | ICD-10-CM

## 2023-05-23 DIAGNOSIS — N13.8 ENLARGED PROSTATE WITH URINARY OBSTRUCTION: ICD-10-CM

## 2023-05-23 DIAGNOSIS — N40.1 ENLARGED PROSTATE WITH URINARY OBSTRUCTION: ICD-10-CM

## 2023-05-23 LAB — PSA SERPL-MCNC: 7.26 NG/ML (ref 0–4)

## 2023-05-23 PROCEDURE — 84153 ASSAY OF PSA TOTAL: CPT

## 2023-05-23 PROCEDURE — 36415 COLL VENOUS BLD VENIPUNCTURE: CPT

## 2023-05-30 ENCOUNTER — LAB (OUTPATIENT)
Dept: LAB | Facility: HOSPITAL | Age: 68
End: 2023-05-30

## 2023-05-30 ENCOUNTER — TRANSCRIBE ORDERS (OUTPATIENT)
Dept: ADMINISTRATIVE | Facility: HOSPITAL | Age: 68
End: 2023-05-30

## 2023-05-30 ENCOUNTER — HOSPITAL ENCOUNTER (OUTPATIENT)
Dept: CARDIOLOGY | Facility: HOSPITAL | Age: 68
Discharge: HOME | End: 2023-05-30

## 2023-05-30 DIAGNOSIS — Z01.818 PREOP TESTING: Primary | ICD-10-CM

## 2023-05-30 DIAGNOSIS — Z01.818 PREOP TESTING: ICD-10-CM

## 2023-05-30 LAB
ANION GAP SERPL CALCULATED.3IONS-SCNC: 10 MMOL/L (ref 5–15)
BASOPHILS # BLD AUTO: 0.11 10*3/MM3 (ref 0–0.2)
BASOPHILS NFR BLD AUTO: 1.2 % (ref 0–1.5)
BUN SERPL-MCNC: 20 MG/DL (ref 8–23)
BUN/CREAT SERPL: 17.2 (ref 7–25)
CALCIUM SPEC-SCNC: 10.9 MG/DL (ref 8.6–10.5)
CHLORIDE SERPL-SCNC: 105 MMOL/L (ref 98–107)
CO2 SERPL-SCNC: 25 MMOL/L (ref 22–29)
CREAT SERPL-MCNC: 1.16 MG/DL (ref 0.76–1.27)
DEPRECATED RDW RBC AUTO: 40.3 FL (ref 37–54)
EGFRCR SERPLBLD CKD-EPI 2021: 69.9 ML/MIN/1.73
EOSINOPHIL # BLD AUTO: 0.22 10*3/MM3 (ref 0–0.4)
EOSINOPHIL NFR BLD AUTO: 2.4 % (ref 0.3–6.2)
ERYTHROCYTE [DISTWIDTH] IN BLOOD BY AUTOMATED COUNT: 12.9 % (ref 12.3–15.4)
GLUCOSE SERPL-MCNC: 109 MG/DL (ref 65–99)
HCT VFR BLD AUTO: 53.4 % (ref 37.5–51)
HGB BLD-MCNC: 18.4 G/DL (ref 13–17.7)
LYMPHOCYTES # BLD AUTO: 1.73 10*3/MM3 (ref 0.7–3.1)
LYMPHOCYTES NFR BLD AUTO: 18.8 % (ref 19.6–45.3)
MCH RBC QN AUTO: 29.9 PG (ref 26.6–33)
MCHC RBC AUTO-ENTMCNC: 34.5 G/DL (ref 31.5–35.7)
MCV RBC AUTO: 86.7 FL (ref 79–97)
MONOCYTES # BLD AUTO: 0.6 10*3/MM3 (ref 0.1–0.9)
MONOCYTES NFR BLD AUTO: 6.5 % (ref 5–12)
NEUTROPHILS NFR BLD AUTO: 6.47 10*3/MM3 (ref 1.7–7)
NEUTROPHILS NFR BLD AUTO: 70.4 % (ref 42.7–76)
PLATELET # BLD AUTO: 269 10*3/MM3 (ref 140–450)
PMV BLD AUTO: 10.7 FL (ref 6–12)
POTASSIUM SERPL-SCNC: 4.5 MMOL/L (ref 3.5–5.2)
QT INTERVAL: 383 MS
RBC # BLD AUTO: 6.16 10*6/MM3 (ref 4.14–5.8)
SODIUM SERPL-SCNC: 140 MMOL/L (ref 136–145)
WBC NRBC COR # BLD: 9.19 10*3/MM3 (ref 3.4–10.8)

## 2023-05-30 PROCEDURE — 93005 ELECTROCARDIOGRAM TRACING: CPT | Performed by: UROLOGY

## 2023-05-30 PROCEDURE — 80048 BASIC METABOLIC PNL TOTAL CA: CPT

## 2023-05-30 PROCEDURE — 36415 COLL VENOUS BLD VENIPUNCTURE: CPT

## 2023-05-30 PROCEDURE — 85025 COMPLETE CBC W/AUTO DIFF WBC: CPT

## 2023-06-13 ENCOUNTER — APPOINTMENT (OUTPATIENT)
Dept: CT IMAGING | Facility: HOSPITAL | Age: 68
End: 2023-06-13
Payer: COMMERCIAL

## 2023-06-13 ENCOUNTER — HOSPITAL ENCOUNTER (OUTPATIENT)
Facility: HOSPITAL | Age: 68
Setting detail: OBSERVATION
Discharge: HOME | End: 2023-06-15
Attending: EMERGENCY MEDICINE | Admitting: INTERNAL MEDICINE
Payer: COMMERCIAL

## 2023-06-13 DIAGNOSIS — N20.1 URETERAL CALCULUS: Primary | ICD-10-CM

## 2023-06-13 DIAGNOSIS — N23 URETERAL COLIC: ICD-10-CM

## 2023-06-13 LAB
ANION GAP SERPL CALCULATED.3IONS-SCNC: 13 MMOL/L (ref 5–15)
BACTERIA UR QL AUTO: ABNORMAL /HPF
BASOPHILS # BLD AUTO: 0 10*3/MM3 (ref 0–0.2)
BASOPHILS NFR BLD AUTO: 0.3 % (ref 0–1.5)
BILIRUB UR QL STRIP: NEGATIVE
BUN SERPL-MCNC: 28 MG/DL (ref 8–23)
BUN/CREAT SERPL: 17.8 (ref 7–25)
CALCIUM SPEC-SCNC: 10 MG/DL (ref 8.6–10.5)
CHLORIDE SERPL-SCNC: 108 MMOL/L (ref 98–107)
CLARITY UR: CLEAR
CO2 SERPL-SCNC: 20 MMOL/L (ref 22–29)
COLOR UR: YELLOW
CREAT SERPL-MCNC: 1.57 MG/DL (ref 0.76–1.27)
DEPRECATED RDW RBC AUTO: 42 FL (ref 37–54)
EGFRCR SERPLBLD CKD-EPI 2021: 48.6 ML/MIN/1.73
EOSINOPHIL # BLD AUTO: 0.1 10*3/MM3 (ref 0–0.4)
EOSINOPHIL NFR BLD AUTO: 0.4 % (ref 0.3–6.2)
ERYTHROCYTE [DISTWIDTH] IN BLOOD BY AUTOMATED COUNT: 13.4 % (ref 12.3–15.4)
GLUCOSE SERPL-MCNC: 264 MG/DL (ref 65–99)
GLUCOSE UR STRIP-MCNC: ABNORMAL MG/DL
HCT VFR BLD AUTO: 50.1 % (ref 37.5–51)
HGB BLD-MCNC: 16.7 G/DL (ref 13–17.7)
HGB UR QL STRIP.AUTO: ABNORMAL
HYALINE CASTS UR QL AUTO: ABNORMAL /LPF
KETONES UR QL STRIP: NEGATIVE
LEUKOCYTE ESTERASE UR QL STRIP.AUTO: NEGATIVE
LYMPHOCYTES # BLD AUTO: 1 10*3/MM3 (ref 0.7–3.1)
LYMPHOCYTES NFR BLD AUTO: 6.2 % (ref 19.6–45.3)
MCH RBC QN AUTO: 30.1 PG (ref 26.6–33)
MCHC RBC AUTO-ENTMCNC: 33.2 G/DL (ref 31.5–35.7)
MCV RBC AUTO: 90.5 FL (ref 79–97)
MONOCYTES # BLD AUTO: 0.6 10*3/MM3 (ref 0.1–0.9)
MONOCYTES NFR BLD AUTO: 4 % (ref 5–12)
NEUTROPHILS NFR BLD AUTO: 13.7 10*3/MM3 (ref 1.7–7)
NEUTROPHILS NFR BLD AUTO: 89.1 % (ref 42.7–76)
NITRITE UR QL STRIP: NEGATIVE
NRBC BLD AUTO-RTO: 0.2 /100 WBC (ref 0–0.2)
PH UR STRIP.AUTO: <=5 [PH] (ref 5–8)
PLATELET # BLD AUTO: 229 10*3/MM3 (ref 140–450)
PMV BLD AUTO: 8.8 FL (ref 6–12)
POTASSIUM SERPL-SCNC: 4.1 MMOL/L (ref 3.5–5.2)
PROT UR QL STRIP: NEGATIVE
RBC # BLD AUTO: 5.54 10*6/MM3 (ref 4.14–5.8)
RBC # UR STRIP: ABNORMAL /HPF
REF LAB TEST METHOD: ABNORMAL
SODIUM SERPL-SCNC: 141 MMOL/L (ref 136–145)
SP GR UR STRIP: 1.02 (ref 1–1.03)
SQUAMOUS #/AREA URNS HPF: ABNORMAL /HPF
UROBILINOGEN UR QL STRIP: ABNORMAL
WBC # UR STRIP: ABNORMAL /HPF
WBC NRBC COR # BLD: 15.3 10*3/MM3 (ref 3.4–10.8)

## 2023-06-13 PROCEDURE — G0378 HOSPITAL OBSERVATION PER HR: HCPCS

## 2023-06-13 PROCEDURE — 25010000002 HYDROMORPHONE 1 MG/ML SOLUTION

## 2023-06-13 PROCEDURE — 80048 BASIC METABOLIC PNL TOTAL CA: CPT | Performed by: EMERGENCY MEDICINE

## 2023-06-13 PROCEDURE — 96374 THER/PROPH/DIAG INJ IV PUSH: CPT

## 2023-06-13 PROCEDURE — 74176 CT ABD & PELVIS W/O CONTRAST: CPT

## 2023-06-13 PROCEDURE — 81001 URINALYSIS AUTO W/SCOPE: CPT | Performed by: EMERGENCY MEDICINE

## 2023-06-13 PROCEDURE — 85025 COMPLETE CBC W/AUTO DIFF WBC: CPT | Performed by: EMERGENCY MEDICINE

## 2023-06-13 PROCEDURE — 25010000002 MORPHINE PER 10 MG: Performed by: EMERGENCY MEDICINE

## 2023-06-13 PROCEDURE — 99284 EMERGENCY DEPT VISIT MOD MDM: CPT

## 2023-06-13 PROCEDURE — 96375 TX/PRO/DX INJ NEW DRUG ADDON: CPT

## 2023-06-13 PROCEDURE — 36415 COLL VENOUS BLD VENIPUNCTURE: CPT

## 2023-06-13 RX ORDER — ACETAMINOPHEN 325 MG/1
650 TABLET ORAL EVERY 4 HOURS PRN
Status: DISCONTINUED | OUTPATIENT
Start: 2023-06-13 | End: 2023-06-15 | Stop reason: HOSPADM

## 2023-06-13 RX ORDER — SODIUM CHLORIDE 0.9 % (FLUSH) 0.9 %
10 SYRINGE (ML) INJECTION AS NEEDED
Status: DISCONTINUED | OUTPATIENT
Start: 2023-06-13 | End: 2023-06-15 | Stop reason: HOSPADM

## 2023-06-13 RX ORDER — ROSUVASTATIN CALCIUM 10 MG/1
10 TABLET, COATED ORAL NIGHTLY
Status: DISCONTINUED | OUTPATIENT
Start: 2023-06-13 | End: 2023-06-15 | Stop reason: HOSPADM

## 2023-06-13 RX ORDER — PANTOPRAZOLE SODIUM 40 MG/1
40 TABLET, DELAYED RELEASE ORAL
Status: DISCONTINUED | OUTPATIENT
Start: 2023-06-14 | End: 2023-06-15 | Stop reason: HOSPADM

## 2023-06-13 RX ORDER — CHOLECALCIFEROL (VITAMIN D3) 125 MCG
5 CAPSULE ORAL NIGHTLY PRN
Status: DISCONTINUED | OUTPATIENT
Start: 2023-06-13 | End: 2023-06-15 | Stop reason: HOSPADM

## 2023-06-13 RX ORDER — POLYETHYLENE GLYCOL 3350 17 G/17G
17 POWDER, FOR SOLUTION ORAL DAILY PRN
Status: DISCONTINUED | OUTPATIENT
Start: 2023-06-13 | End: 2023-06-15 | Stop reason: HOSPADM

## 2023-06-13 RX ORDER — SODIUM CHLORIDE 0.9 % (FLUSH) 0.9 %
10 SYRINGE (ML) INJECTION EVERY 12 HOURS SCHEDULED
Status: DISCONTINUED | OUTPATIENT
Start: 2023-06-13 | End: 2023-06-15 | Stop reason: HOSPADM

## 2023-06-13 RX ORDER — BISACODYL 5 MG/1
5 TABLET, DELAYED RELEASE ORAL DAILY PRN
Status: DISCONTINUED | OUTPATIENT
Start: 2023-06-13 | End: 2023-06-15 | Stop reason: HOSPADM

## 2023-06-13 RX ORDER — IBUPROFEN 600 MG/1
1 TABLET ORAL
Status: DISCONTINUED | OUTPATIENT
Start: 2023-06-13 | End: 2023-06-15 | Stop reason: HOSPADM

## 2023-06-13 RX ORDER — ONDANSETRON 4 MG/1
4 TABLET, FILM COATED ORAL EVERY 6 HOURS PRN
Status: DISCONTINUED | OUTPATIENT
Start: 2023-06-13 | End: 2023-06-15 | Stop reason: HOSPADM

## 2023-06-13 RX ORDER — ALUMINA, MAGNESIA, AND SIMETHICONE 2400; 2400; 240 MG/30ML; MG/30ML; MG/30ML
15 SUSPENSION ORAL EVERY 6 HOURS PRN
Status: DISCONTINUED | OUTPATIENT
Start: 2023-06-13 | End: 2023-06-15 | Stop reason: HOSPADM

## 2023-06-13 RX ORDER — BISACODYL 10 MG
10 SUPPOSITORY, RECTAL RECTAL DAILY PRN
Status: DISCONTINUED | OUTPATIENT
Start: 2023-06-13 | End: 2023-06-15 | Stop reason: HOSPADM

## 2023-06-13 RX ORDER — ONDANSETRON 2 MG/ML
4 INJECTION INTRAMUSCULAR; INTRAVENOUS EVERY 6 HOURS PRN
Status: DISCONTINUED | OUTPATIENT
Start: 2023-06-13 | End: 2023-06-15 | Stop reason: HOSPADM

## 2023-06-13 RX ORDER — HYDRALAZINE HYDROCHLORIDE 20 MG/ML
10 INJECTION INTRAMUSCULAR; INTRAVENOUS EVERY 6 HOURS PRN
Status: DISCONTINUED | OUTPATIENT
Start: 2023-06-13 | End: 2023-06-15 | Stop reason: HOSPADM

## 2023-06-13 RX ORDER — INSULIN LISPRO 100 [IU]/ML
3-14 INJECTION, SOLUTION INTRAVENOUS; SUBCUTANEOUS EVERY 6 HOURS SCHEDULED
Status: DISCONTINUED | OUTPATIENT
Start: 2023-06-13 | End: 2023-06-13

## 2023-06-13 RX ORDER — AMOXICILLIN 250 MG
2 CAPSULE ORAL 2 TIMES DAILY
Status: DISCONTINUED | OUTPATIENT
Start: 2023-06-13 | End: 2023-06-15 | Stop reason: HOSPADM

## 2023-06-13 RX ORDER — NALOXONE HCL 0.4 MG/ML
0.4 VIAL (ML) INJECTION
Status: DISCONTINUED | OUTPATIENT
Start: 2023-06-13 | End: 2023-06-15 | Stop reason: HOSPADM

## 2023-06-13 RX ORDER — ACETAMINOPHEN 650 MG/1
650 SUPPOSITORY RECTAL EVERY 4 HOURS PRN
Status: DISCONTINUED | OUTPATIENT
Start: 2023-06-13 | End: 2023-06-15 | Stop reason: HOSPADM

## 2023-06-13 RX ORDER — ACETAMINOPHEN 160 MG/5ML
650 SOLUTION ORAL EVERY 4 HOURS PRN
Status: DISCONTINUED | OUTPATIENT
Start: 2023-06-13 | End: 2023-06-15 | Stop reason: HOSPADM

## 2023-06-13 RX ORDER — SEMAGLUTIDE 2.68 MG/ML
2 INJECTION, SOLUTION SUBCUTANEOUS WEEKLY
COMMUNITY

## 2023-06-13 RX ORDER — NICOTINE POLACRILEX 4 MG
15 LOZENGE BUCCAL
Status: DISCONTINUED | OUTPATIENT
Start: 2023-06-13 | End: 2023-06-15 | Stop reason: HOSPADM

## 2023-06-13 RX ORDER — DEXTROSE MONOHYDRATE 25 G/50ML
25 INJECTION, SOLUTION INTRAVENOUS
Status: DISCONTINUED | OUTPATIENT
Start: 2023-06-13 | End: 2023-06-15 | Stop reason: HOSPADM

## 2023-06-13 RX ORDER — SODIUM CHLORIDE 9 MG/ML
40 INJECTION, SOLUTION INTRAVENOUS AS NEEDED
Status: DISCONTINUED | OUTPATIENT
Start: 2023-06-13 | End: 2023-06-15 | Stop reason: HOSPADM

## 2023-06-13 RX ORDER — INSULIN LISPRO 100 [IU]/ML
3-14 INJECTION, SOLUTION INTRAVENOUS; SUBCUTANEOUS
Status: DISCONTINUED | OUTPATIENT
Start: 2023-06-14 | End: 2023-06-15 | Stop reason: HOSPADM

## 2023-06-13 RX ORDER — MORPHINE SULFATE 2 MG/ML
2 INJECTION, SOLUTION INTRAMUSCULAR; INTRAVENOUS ONCE
Status: COMPLETED | OUTPATIENT
Start: 2023-06-13 | End: 2023-06-13

## 2023-06-13 RX ADMIN — Medication 10 ML: at 20:01

## 2023-06-13 RX ADMIN — MORPHINE SULFATE 2 MG: 2 INJECTION, SOLUTION INTRAMUSCULAR; INTRAVENOUS at 15:03

## 2023-06-13 RX ADMIN — SENNOSIDES AND DOCUSATE SODIUM 2 TABLET: 50; 8.6 TABLET ORAL at 20:00

## 2023-06-13 RX ADMIN — ROSUVASTATIN CALCIUM 10 MG: 10 TABLET, COATED ORAL at 20:01

## 2023-06-13 RX ADMIN — HYDROMORPHONE HYDROCHLORIDE 1 MG: 1 INJECTION, SOLUTION INTRAMUSCULAR; INTRAVENOUS; SUBCUTANEOUS at 17:58

## 2023-06-13 NOTE — CASE MANAGEMENT/SOCIAL WORK
Discharge Planning Assessment  [facility]     Patient Name: Herb Mariscal  MRN: [id number]  Today's Date: 6/13/2023    Admit Date: 6/13/2023    Plan: Anticipate Routine Home   Discharge Needs Assessment       Row Name 06/13/23 7296       Living Environment    People in Home spouse    Current Living Arrangements home    Potentially Unsafe Housing Conditions none    Primary Care Provided by self    Provides Primary Care For no one       Resource/Environmental Concerns    Resource/Environmental Concerns none    Transportation Concerns none       Food Insecurity    Within the past 12 months, you worried that your food would run out before you got the money to buy more. Never true    Within the past 12 months, the food you bought just didn't last and you didn't have money to get more. Never true       Transition Planning    Patient/Family Anticipates Transition to home with family    Patient/Family Anticipated Services at Transition none    Transportation Anticipated car, drives self       Discharge Needs Assessment    Readmission Within the Last 30 Days no previous admission in last 30 days    Equipment Currently Used at Home none    Concerns to be Addressed denies needs/concerns at this time    Anticipated Changes Related to Illness none    Equipment Needed After Discharge none                   Discharge Plan       Row Name 06/13/23 5669       Plan    Plan Anticipate Routine Home    Plan Comments Met with Patient at bedside Lives at home with wife. IADL's pcp and Pharmacy verified, able to afford medications. denies any needs at this time. D/C Barriers: uruology consult                  Continued Care and Services - Admitted Since 6/13/2023    Coordination has not been started for this encounter.       Selected Continued Care - Episodes Includes continued care and service providers with selected services from the active episodes listed below      [facility] Episode start date: 2/24/2023   There are no  active outsourced providers for this episode.                    Demographic Summary       Row Name 06/13/23 5950       General Information    Admission Type observation    Arrived From emergency department    Referral Source admission list    Reason for Consult discharge planning    Preferred Language English                   Functional Status       Row Name 06/13/23 7443       Functional Status    Usual Activity Tolerance good    Current Activity Tolerance good       Functional Status, IADL    Medications independent    Meal Preparation independent    Housekeeping independent    Laundry independent    Shopping independent       Mental Status    General Appearance WDL WDL       Mental Status Summary    Recent Changes in Mental Status/Cognitive Functioning no changes                      Mary Wallis RN

## 2023-06-13 NOTE — H&P
[facility]  History & Physical    Patient Name: Herb Mariscal  : 1955  MRN: [id number]  Primary Care Physician:  Caesar Limon MD  Date of admission: 2023  Date and Time of Service: 2023 at 1620    Subjective      Chief Complaint: Flank pain     History of Present Illness: Herb Mariscal is a 67 y.o. male who presented to [facility] on 2023 complaining of right flank pain and right lower quadrant pain that started this morning.  He reports associated nausea, weak, slow urinary stream.  He denies vomiting or fever.  He does report having a prostate biopsy done last Tuesday with Dr. Virk.  He reports some hematuria after the procedure that has improved.  He denies any other complaints at this time.    In the ED, CT of abdomen pelvis, showed mild right hydronephrosis.  With 2 calculi in the right ureter more proximal measuring 4.7 mm the more distal measuring 2 mm just proximal to the right ureterovesical junction.  Right perinephric stranding perinephric fluid.  Nonobstructing bilateral renal calculi.  Wall thickening of the urinary bladder which could be artifactual due to under distention versus cystitis.  Vitals on admission, temp 98.1, heart rate 71, respirations 20, /83, 96% on room air. All labs unremarkable except BUN 28, creatinine 1.57, EGFR 48.6, glucose 264, WBC 15.3.  UA showed +3 blood, 13-20 red blood cells, 0-2 WBC, negative nitrates negative leukocytes, no bacteria seen. Patient received 2 mg IV morphine in ED. Hospitalist was contacted to admit patient for further care and management.  Additionally urology was consulted by the ED provider.      12 point ROS reviewed and negative except as mentioned above.  Personal History     Past Medical History:   Diagnosis Date    Angina pectoris     Diabetes mellitus     type 2    Hyperlipidemia     Hypertension     Type 2 diabetes mellitus        Past Surgical History:   Procedure  Laterality Date    HAND SURGERY  1990    HAND SURGERY         Family History: family history includes Cancer in his father; Diabetes in his brother and maternal uncle; Hyperlipidemia in his mother. Otherwise pertinent FHx was reviewed and not pertinent to current issue.    Social History:  reports that he quit smoking about 13 years ago. His smoking use included cigarettes. He has never used smokeless tobacco. He reports that he does not drink alcohol and does not use drugs.    Home Medications:  Prior to Admission Medications       Prescriptions Last Dose Informant Patient Reported? Taking?    Cyanocobalamin (VITAMIN B-12) 1000 MCG sublingual tablet   No Yes    Place 1,000 mcg under the tongue Daily.    dapagliflozin Propanediol (Farxiga) 10 MG tablet   No Yes    Take 10 mg by mouth Daily. Take 1 tablet (10mg) by mouth daily.    enalapril (VASOTEC) 20 MG tablet   No Yes    Take 1 tablet by mouth Daily.    icosapent ethyl (VASCEPA) 1 g capsule capsule   No Yes    Take 2 g by mouth 2 (Two) Times a Day With Meals.    omeprazole (priLOSEC) 20 MG capsule   No Yes    Take 1 capsule by mouth Daily. before a meal    rosuvastatin (CRESTOR) 10 MG tablet   No Yes    TAKE 1 TABLET BY MOUTH EVERY DAY AT NIGHT    Semaglutide, 2 MG/DOSE, (Ozempic, 2 MG/DOSE,) 8 MG/3ML solution pen-injector   Yes Yes    Inject 2 mg under the skin into the appropriate area as directed 1 (One) Time Per Week. Thur    topiramate (TOPAMAX) 100 MG tablet   Yes Yes    Take 1 tablet by mouth 2 (Two) Times a Day.    Tresiba FlexTouch 200 UNIT/ML solution pen-injector pen injection   No Yes    INJECT 120 UNITS UNDER THE SKIN INTO THE APPROPRIATE AREA AS DIRECTED DAILY.              Allergies:  No Known Allergies    Objective      Vitals:   Temp:  [98 °F (36.7 °C)-98.1 °F (36.7 °C)] 98 °F (36.7 °C)  Heart Rate:  [71-80] 80  Resp:  [20] 20  BP: (132-163)/(76-83) 132/76    Physical Exam  Vitals reviewed.   Constitutional:       General: He is awake.       Appearance: He is obese.   HENT:      Head: Normocephalic and atraumatic.      Nose: Nose normal.      Mouth/Throat:      Mouth: Mucous membranes are dry.      Pharynx: Oropharynx is clear.   Eyes:      Extraocular Movements: Extraocular movements intact.      Conjunctiva/sclera: Conjunctivae normal.      Pupils: Pupils are equal, round, and reactive to light.   Cardiovascular:      Rate and Rhythm: Normal rate and regular rhythm.      Pulses: Normal pulses.      Heart sounds: Normal heart sounds, S1 normal and S2 normal.   Pulmonary:      Effort: Pulmonary effort is normal.      Breath sounds: Normal breath sounds.   Abdominal:      General: Bowel sounds are normal.      Palpations: Abdomen is soft.      Tenderness: There is abdominal tenderness in the right lower quadrant. There is right CVA tenderness.   Musculoskeletal:         General: Normal range of motion.      Cervical back: Normal range of motion and neck supple.   Skin:     General: Skin is warm and dry.      Capillary Refill: Capillary refill takes less than 2 seconds.   Neurological:      General: No focal deficit present.      Mental Status: He is alert and oriented to person, place, and time. Mental status is at baseline.   Psychiatric:         Mood and Affect: Mood normal.         Behavior: Behavior normal. Behavior is cooperative.        Result Review    Result Review:  I have personally reviewed the results from the time of this admission to 6/13/2023 19:24 EDT and agree with these findings:  [x]  Laboratory  [x]  Microbiology  [x]  Radiology  []  EKG/Telemetry   []  Cardiology/Vascular   []  Pathology  []  Old records  []  Other:  Most notable findings include:   In the ED, CT of abdomen pelvis, showed mild right hydronephrosis.  With 2 calculi in the right ureter more proximal measuring 4.7 mm the more distal measuring 2 mm just proximal to the right ureterovesical junction.  Right perinephric stranding perinephric fluid.  Nonobstructing  bilateral renal calculi.  Wall thickening of the urinary bladder which could be artifactual due to under distention versus cystitis.  Vitals on admission, temp 98.1, heart rate 71, respirations 20, /83, 96% on room air. All labs unremarkable except BUN 28, creatinine 1.57, EGFR 48.6, glucose 264, WBC 15.3.  UA showed +3 blood, 13-20 red blood cells, 0-2 WBC, negative nitrates negative leukocytes, no bacteria seen. Patient received 2 mg IV morphine in ED. Hospitalist was contacted to admit patient for further care and management.  Additionally urology was consulted by the ED provider.      Assessment & Plan        Active Hospital Problems:  Active Hospital Problems    Diagnosis     **Ureteral calculus      Plan:     Ureteral calculus  Ureteral colic  - CT abdomen/pelvis reviewed, significant for 2 calculi in the right ureter more proximal measuring 4.7 mm the more distal measuring 2 mm just proximal to the right ureterovesical junction  - UA reviewed, showed +3 blood, 13-20 red blood cells, 0-2 WBC, negative nitrates negative leukocytes, no bacteria seen  - pain and antiemetics ordered  - NPO at midnight >>> possible need for urology intervention  - Urology consulted     Acute Kidney Injury  - likely post-renal due to ureteral calculus  - BUN 28  - Creatinine 1.57 (baseline around 1.0)  - eGFR 48.6  - Monitor I's and O's  - Monitor BMP  - Avoid Nephrotoxic medications and IV dye unless urgently needed     Essential Hypertension  - chronic  - BP on admission 163/83  - Monitor BP while admitted  - Hold enalapril due to CHEMA  - Hydralazine ordered PRN     Hyperlipidemia  - Continue statin     Type 2 diabetes mellitus  - Glucose on admission 264  - Hemoglobin A1c 8.8 (02/21/2023)  - Hold home medications  - Start SSI  - Glucose checks ACHS        DVT prophylaxis: Mechanical SCDs      CODE STATUS:    Level Of Support Discussed With: Patient  Code Status (Patient has no pulse and is not breathing): CPR (Attempt to  Resuscitate)  Medical Interventions (Patient has pulse or is breathing): Full Support    Admission Status:  I believe this patient meets observation status.    I discussed the patient's findings and my recommendations with patient and family.    This patient has been examined wearing appropriate Personal Protective Equipment  06/13/23      Signature: Electronically signed by JOAQUIN Cheng, 06/13/23, 19:24 EDT.  [facility] Hospitalist Team

## 2023-06-13 NOTE — ED PROVIDER NOTES
Subjective   History of Present Illness  Patient is a 67-year-old male complaining of 12-hour history of right flank pain with nausea.  States pain is moderate to severe and constant there is no other radiation of pain.  Nuys further complaints.    Review of Systems    Past Medical History:   Diagnosis Date    Angina pectoris     Diabetes mellitus     type 2    Hyperlipidemia     Hypertension     Type 2 diabetes mellitus        No Known Allergies    Past Surgical History:   Procedure Laterality Date    HAND SURGERY      HAND SURGERY         Family History   Problem Relation Age of Onset    Cancer Father     Diabetes Brother     Hyperlipidemia Mother     Diabetes Maternal Uncle        Social History     Socioeconomic History    Marital status:      Spouse name: Odette    Number of children: 0    Years of education: 14   Tobacco Use    Smoking status: Former     Types: Cigarettes     Quit date:      Years since quittin.4    Smokeless tobacco: Never   Vaping Use    Vaping Use: Never used   Substance and Sexual Activity    Alcohol use: No    Drug use: No    Sexual activity: Defer           Objective   Physical Exam  Lungs are clear.  Heart has regular rate rhythm without murmur.  Chest is nontender.  Abdomen soft with moderate right flank tenderness.  Patient has normal bowel sounds without rebound or guarding.  Back has no CVA tenderness.  Procedures           ED Course      Results for orders placed or performed during the hospital encounter of 23   Basic Metabolic Panel    Specimen: Blood   Result Value Ref Range    Glucose 264 (H) 65 - 99 mg/dL    BUN 28 (H) 8 - 23 mg/dL    Creatinine 1.57 (H) 0.76 - 1.27 mg/dL    Sodium 141 136 - 145 mmol/L    Potassium 4.1 3.5 - 5.2 mmol/L    Chloride 108 (H) 98 - 107 mmol/L    CO2 20.0 (L) 22.0 - 29.0 mmol/L    Calcium 10.0 8.6 - 10.5 mg/dL    BUN/Creatinine Ratio 17.8 7.0 - 25.0    Anion Gap 13.0 5.0 - 15.0 mmol/L    eGFR 48.6 (L) >60.0 mL/min/1.73    Urinalysis With Microscopic If Indicated (No Culture) - Urine, Clean Catch    Specimen: Urine, Clean Catch   Result Value Ref Range    Color, UA Yellow Yellow, Straw    Appearance, UA Clear Clear    pH, UA <=5.0 5.0 - 8.0    Specific Gravity, UA 1.021 1.005 - 1.030    Glucose, UA >=1000 mg/dL (3+) (A) Negative    Ketones, UA Negative Negative    Bilirubin, UA Negative Negative    Blood, UA Large (3+) (A) Negative    Protein, UA Negative Negative    Leuk Esterase, UA Negative Negative    Nitrite, UA Negative Negative    Urobilinogen, UA 0.2 E.U./dL 0.2 - 1.0 E.U./dL   CBC Auto Differential    Specimen: Blood   Result Value Ref Range    WBC 15.30 (H) 3.40 - 10.80 10*3/mm3    RBC 5.54 4.14 - 5.80 10*6/mm3    Hemoglobin 16.7 13.0 - 17.7 g/dL    Hematocrit 50.1 37.5 - 51.0 %    MCV 90.5 79.0 - 97.0 fL    MCH 30.1 26.6 - 33.0 pg    MCHC 33.2 31.5 - 35.7 g/dL    RDW 13.4 12.3 - 15.4 %    RDW-SD 42.0 37.0 - 54.0 fl    MPV 8.8 6.0 - 12.0 fL    Platelets 229 140 - 450 10*3/mm3    Neutrophil % 89.1 (H) 42.7 - 76.0 %    Lymphocyte % 6.2 (L) 19.6 - 45.3 %    Monocyte % 4.0 (L) 5.0 - 12.0 %    Eosinophil % 0.4 0.3 - 6.2 %    Basophil % 0.3 0.0 - 1.5 %    Neutrophils, Absolute 13.70 (H) 1.70 - 7.00 10*3/mm3    Lymphocytes, Absolute 1.00 0.70 - 3.10 10*3/mm3    Monocytes, Absolute 0.60 0.10 - 0.90 10*3/mm3    Eosinophils, Absolute 0.10 0.00 - 0.40 10*3/mm3    Basophils, Absolute 0.00 0.00 - 0.20 10*3/mm3    nRBC 0.2 0.0 - 0.2 /100 WBC   Urinalysis, Microscopic Only - Urine, Clean Catch    Specimen: Urine, Clean Catch   Result Value Ref Range    RBC, UA 13-20 (A) None Seen /HPF    WBC, UA 0-2 (A) None Seen /HPF    Bacteria, UA None Seen None Seen /HPF    Squamous Epithelial Cells, UA None Seen None Seen, 0-2 /HPF    Hyaline Casts, UA None Seen None Seen /LPF    Methodology Automated Microscopy      No radiology results for the last day                                       Medical Decision Making  My interpretation patient CT scan  and pelvis without contrast shows a 4 mm stone in the right proximal ureter with obstructive change as well as a 2 mm stone at the right UV junction with obstructive change.  There is no evidence of bowel perforation abscess formation or other abnormality.  UA shows no evidence urinary tract infection.  Metabolic panel is at baseline other than mild renal insufficiency.  There is no evidence of electrolyte abnormality.  Patient will be admitted with a diagnosis of ureteral calculus with colic.  I did speak to the on-call urologist.    Amount and/or Complexity of Data Reviewed  Labs: ordered. Decision-making details documented in ED Course.  Radiology: ordered and independent interpretation performed.    Risk  Prescription drug management.  Parenteral controlled substances.  Decision regarding hospitalization.        Final diagnoses:   Ureteral calculus   Ureteral colic       ED Disposition  ED Disposition       None            No follow-up provider specified.       Medication List      No changes were made to your prescriptions during this visit.

## 2023-06-14 ENCOUNTER — ANESTHESIA (OUTPATIENT)
Dept: PERIOP | Facility: HOSPITAL | Age: 68
End: 2023-06-14
Payer: COMMERCIAL

## 2023-06-14 ENCOUNTER — ANESTHESIA EVENT (OUTPATIENT)
Dept: PERIOP | Facility: HOSPITAL | Age: 68
End: 2023-06-14
Payer: COMMERCIAL

## 2023-06-14 LAB
ALBUMIN SERPL-MCNC: 4.1 G/DL (ref 3.5–5.2)
ALBUMIN/GLOB SERPL: 1.3 G/DL
ALP SERPL-CCNC: 83 U/L (ref 39–117)
ALT SERPL W P-5'-P-CCNC: 39 U/L (ref 1–41)
ANION GAP SERPL CALCULATED.3IONS-SCNC: 15 MMOL/L (ref 5–15)
AST SERPL-CCNC: 24 U/L (ref 1–40)
BASOPHILS # BLD AUTO: 0.1 10*3/MM3 (ref 0–0.2)
BASOPHILS NFR BLD AUTO: 0.5 % (ref 0–1.5)
BILIRUB SERPL-MCNC: 0.5 MG/DL (ref 0–1.2)
BUN SERPL-MCNC: 26 MG/DL (ref 8–23)
BUN/CREAT SERPL: 15.2 (ref 7–25)
CALCIUM SPEC-SCNC: 9.7 MG/DL (ref 8.6–10.5)
CHLORIDE SERPL-SCNC: 107 MMOL/L (ref 98–107)
CO2 SERPL-SCNC: 19 MMOL/L (ref 22–29)
CREAT SERPL-MCNC: 1.71 MG/DL (ref 0.76–1.27)
CRP SERPL-MCNC: <0.3 MG/DL (ref 0–0.5)
DEPRECATED RDW RBC AUTO: 44.2 FL (ref 37–54)
EGFRCR SERPLBLD CKD-EPI 2021: 43.9 ML/MIN/1.73
EOSINOPHIL # BLD AUTO: 0.1 10*3/MM3 (ref 0–0.4)
EOSINOPHIL NFR BLD AUTO: 0.4 % (ref 0.3–6.2)
ERYTHROCYTE [DISTWIDTH] IN BLOOD BY AUTOMATED COUNT: 13.7 % (ref 12.3–15.4)
ERYTHROCYTE [SEDIMENTATION RATE] IN BLOOD: 17 MM/HR (ref 0–20)
GLOBULIN UR ELPH-MCNC: 3.2 GM/DL
GLUCOSE BLDC GLUCOMTR-MCNC: 155 MG/DL (ref 70–105)
GLUCOSE BLDC GLUCOMTR-MCNC: 174 MG/DL (ref 70–105)
GLUCOSE BLDC GLUCOMTR-MCNC: 205 MG/DL (ref 70–105)
GLUCOSE SERPL-MCNC: 175 MG/DL (ref 65–99)
HCT VFR BLD AUTO: 48.5 % (ref 37.5–51)
HGB BLD-MCNC: 16.6 G/DL (ref 13–17.7)
LYMPHOCYTES # BLD AUTO: 1.4 10*3/MM3 (ref 0.7–3.1)
LYMPHOCYTES NFR BLD AUTO: 8.2 % (ref 19.6–45.3)
MAGNESIUM SERPL-MCNC: 2.2 MG/DL (ref 1.6–2.4)
MCH RBC QN AUTO: 30 PG (ref 26.6–33)
MCHC RBC AUTO-ENTMCNC: 34.3 G/DL (ref 31.5–35.7)
MCV RBC AUTO: 87.5 FL (ref 79–97)
MONOCYTES # BLD AUTO: 1.2 10*3/MM3 (ref 0.1–0.9)
MONOCYTES NFR BLD AUTO: 6.9 % (ref 5–12)
NEUTROPHILS NFR BLD AUTO: 14.5 10*3/MM3 (ref 1.7–7)
NEUTROPHILS NFR BLD AUTO: 84 % (ref 42.7–76)
NRBC BLD AUTO-RTO: 0.3 /100 WBC (ref 0–0.2)
PHOSPHATE SERPL-MCNC: 3.9 MG/DL (ref 2.5–4.5)
PLATELET # BLD AUTO: 216 10*3/MM3 (ref 140–450)
PMV BLD AUTO: 9.1 FL (ref 6–12)
POTASSIUM SERPL-SCNC: 3.6 MMOL/L (ref 3.5–5.2)
POTASSIUM SERPL-SCNC: 4.2 MMOL/L (ref 3.5–5.2)
PROCALCITONIN SERPL-MCNC: 0.09 NG/ML (ref 0–0.25)
PROT SERPL-MCNC: 7.3 G/DL (ref 6–8.5)
RBC # BLD AUTO: 5.54 10*6/MM3 (ref 4.14–5.8)
SODIUM SERPL-SCNC: 141 MMOL/L (ref 136–145)
WBC NRBC COR # BLD: 17.2 10*3/MM3 (ref 3.4–10.8)

## 2023-06-14 PROCEDURE — 82948 REAGENT STRIP/BLOOD GLUCOSE: CPT

## 2023-06-14 PROCEDURE — 84100 ASSAY OF PHOSPHORUS: CPT | Performed by: INTERNAL MEDICINE

## 2023-06-14 PROCEDURE — 85652 RBC SED RATE AUTOMATED: CPT | Performed by: INTERNAL MEDICINE

## 2023-06-14 PROCEDURE — G0378 HOSPITAL OBSERVATION PER HR: HCPCS

## 2023-06-14 PROCEDURE — 25010000002 HYDROMORPHONE 1 MG/ML SOLUTION

## 2023-06-14 PROCEDURE — 0 IOHEXOL 300 MG/ML SOLUTION: Performed by: UROLOGY

## 2023-06-14 PROCEDURE — 84145 PROCALCITONIN (PCT): CPT | Performed by: INTERNAL MEDICINE

## 2023-06-14 PROCEDURE — 25010000002 HYDROMORPHONE 1 MG/ML SOLUTION: Performed by: INTERNAL MEDICINE

## 2023-06-14 PROCEDURE — 25010000002 FENTANYL CITRATE (PF) 50 MCG/ML SOLUTION: Performed by: NURSE ANESTHETIST, CERTIFIED REGISTERED

## 2023-06-14 PROCEDURE — 25010000002 ONDANSETRON PER 1 MG: Performed by: NURSE ANESTHETIST, CERTIFIED REGISTERED

## 2023-06-14 PROCEDURE — C1758 CATHETER, URETERAL: HCPCS | Performed by: UROLOGY

## 2023-06-14 PROCEDURE — 0 MEPERIDINE PER 100 MG: Performed by: ANESTHESIOLOGY

## 2023-06-14 PROCEDURE — 84132 ASSAY OF SERUM POTASSIUM: CPT | Performed by: INTERNAL MEDICINE

## 2023-06-14 PROCEDURE — 80053 COMPREHEN METABOLIC PANEL: CPT | Performed by: INTERNAL MEDICINE

## 2023-06-14 PROCEDURE — 85025 COMPLETE CBC W/AUTO DIFF WBC: CPT | Performed by: INTERNAL MEDICINE

## 2023-06-14 PROCEDURE — 25010000002 MIDAZOLAM PER 1 MG: Performed by: ANESTHESIOLOGY

## 2023-06-14 PROCEDURE — 86140 C-REACTIVE PROTEIN: CPT | Performed by: INTERNAL MEDICINE

## 2023-06-14 PROCEDURE — 25010000002 CEFAZOLIN PER 500 MG: Performed by: UROLOGY

## 2023-06-14 PROCEDURE — C1769 GUIDE WIRE: HCPCS | Performed by: UROLOGY

## 2023-06-14 PROCEDURE — 96376 TX/PRO/DX INJ SAME DRUG ADON: CPT

## 2023-06-14 PROCEDURE — C2617 STENT, NON-COR, TEM W/O DEL: HCPCS | Performed by: UROLOGY

## 2023-06-14 PROCEDURE — 63710000001 INSULIN LISPRO (HUMAN) PER 5 UNITS

## 2023-06-14 PROCEDURE — 25010000002 PROPOFOL 200 MG/20ML EMULSION: Performed by: NURSE ANESTHETIST, CERTIFIED REGISTERED

## 2023-06-14 PROCEDURE — 83735 ASSAY OF MAGNESIUM: CPT | Performed by: INTERNAL MEDICINE

## 2023-06-14 DEVICE — VARIABLE LENGTH URETERAL STENT
Type: IMPLANTABLE DEVICE | Site: URETER | Status: FUNCTIONAL
Brand: CONTOUR VL™

## 2023-06-14 RX ORDER — SODIUM CHLORIDE 0.9 % (FLUSH) 0.9 %
10 SYRINGE (ML) INJECTION AS NEEDED
Status: DISCONTINUED | OUTPATIENT
Start: 2023-06-14 | End: 2023-06-14 | Stop reason: HOSPADM

## 2023-06-14 RX ORDER — FLUMAZENIL 0.1 MG/ML
0.2 INJECTION INTRAVENOUS AS NEEDED
Status: DISCONTINUED | OUTPATIENT
Start: 2023-06-14 | End: 2023-06-14 | Stop reason: HOSPADM

## 2023-06-14 RX ORDER — SODIUM CHLORIDE, SODIUM LACTATE, POTASSIUM CHLORIDE, CALCIUM CHLORIDE 600; 310; 30; 20 MG/100ML; MG/100ML; MG/100ML; MG/100ML
INJECTION, SOLUTION INTRAVENOUS CONTINUOUS PRN
Status: DISCONTINUED | OUTPATIENT
Start: 2023-06-14 | End: 2023-06-14 | Stop reason: SURG

## 2023-06-14 RX ORDER — IPRATROPIUM BROMIDE AND ALBUTEROL SULFATE 2.5; .5 MG/3ML; MG/3ML
3 SOLUTION RESPIRATORY (INHALATION) ONCE AS NEEDED
Status: DISCONTINUED | OUTPATIENT
Start: 2023-06-14 | End: 2023-06-14 | Stop reason: HOSPADM

## 2023-06-14 RX ORDER — DIPHENHYDRAMINE HYDROCHLORIDE 50 MG/ML
12.5 INJECTION INTRAMUSCULAR; INTRAVENOUS
Status: DISCONTINUED | OUTPATIENT
Start: 2023-06-14 | End: 2023-06-14 | Stop reason: HOSPADM

## 2023-06-14 RX ORDER — ONDANSETRON 2 MG/ML
4 INJECTION INTRAMUSCULAR; INTRAVENOUS ONCE AS NEEDED
Status: COMPLETED | OUTPATIENT
Start: 2023-06-14 | End: 2023-06-14

## 2023-06-14 RX ORDER — SODIUM CHLORIDE 9 MG/ML
40 INJECTION, SOLUTION INTRAVENOUS AS NEEDED
Status: DISCONTINUED | OUTPATIENT
Start: 2023-06-14 | End: 2023-06-14 | Stop reason: HOSPADM

## 2023-06-14 RX ORDER — DROPERIDOL 2.5 MG/ML
0.62 INJECTION, SOLUTION INTRAMUSCULAR; INTRAVENOUS
Status: DISCONTINUED | OUTPATIENT
Start: 2023-06-14 | End: 2023-06-14 | Stop reason: HOSPADM

## 2023-06-14 RX ORDER — PROMETHAZINE HYDROCHLORIDE 25 MG/1
25 SUPPOSITORY RECTAL ONCE AS NEEDED
Status: DISCONTINUED | OUTPATIENT
Start: 2023-06-14 | End: 2023-06-14 | Stop reason: SDUPTHER

## 2023-06-14 RX ORDER — HYDROCODONE BITARTRATE AND ACETAMINOPHEN 10; 325 MG/1; MG/1
1 TABLET ORAL EVERY 4 HOURS PRN
Status: DISCONTINUED | OUTPATIENT
Start: 2023-06-14 | End: 2023-06-14 | Stop reason: HOSPADM

## 2023-06-14 RX ORDER — DOCUSATE SODIUM 100 MG/1
100 CAPSULE, LIQUID FILLED ORAL 2 TIMES DAILY PRN
Qty: 30 CAPSULE | Refills: 1 | Status: SHIPPED | OUTPATIENT
Start: 2023-06-14

## 2023-06-14 RX ORDER — MIDAZOLAM HYDROCHLORIDE 1 MG/ML
1 INJECTION INTRAMUSCULAR; INTRAVENOUS
Status: DISCONTINUED | OUTPATIENT
Start: 2023-06-14 | End: 2023-06-14 | Stop reason: HOSPADM

## 2023-06-14 RX ORDER — NALOXONE HCL 0.4 MG/ML
0.2 VIAL (ML) INJECTION AS NEEDED
Status: DISCONTINUED | OUTPATIENT
Start: 2023-06-14 | End: 2023-06-14 | Stop reason: HOSPADM

## 2023-06-14 RX ORDER — MEPERIDINE HYDROCHLORIDE 25 MG/ML
12.5 INJECTION INTRAMUSCULAR; INTRAVENOUS; SUBCUTANEOUS
Status: DISCONTINUED | OUTPATIENT
Start: 2023-06-14 | End: 2023-06-14 | Stop reason: HOSPADM

## 2023-06-14 RX ORDER — PROMETHAZINE HYDROCHLORIDE 25 MG/1
25 TABLET ORAL ONCE AS NEEDED
Status: DISCONTINUED | OUTPATIENT
Start: 2023-06-14 | End: 2023-06-14 | Stop reason: SDUPTHER

## 2023-06-14 RX ORDER — FENTANYL CITRATE 50 UG/ML
50 INJECTION, SOLUTION INTRAMUSCULAR; INTRAVENOUS
Status: DISCONTINUED | OUTPATIENT
Start: 2023-06-14 | End: 2023-06-14 | Stop reason: HOSPADM

## 2023-06-14 RX ORDER — CEPHALEXIN 500 MG/1
500 CAPSULE ORAL 3 TIMES DAILY
Qty: 15 CAPSULE | Refills: 0 | Status: SHIPPED | OUTPATIENT
Start: 2023-06-14 | End: 2023-06-19

## 2023-06-14 RX ORDER — POTASSIUM CHLORIDE 20 MEQ/1
40 TABLET, EXTENDED RELEASE ORAL EVERY 4 HOURS
Status: DISPENSED | OUTPATIENT
Start: 2023-06-14 | End: 2023-06-14

## 2023-06-14 RX ORDER — EPHEDRINE SULFATE 5 MG/ML
5 INJECTION INTRAVENOUS ONCE AS NEEDED
Status: DISCONTINUED | OUTPATIENT
Start: 2023-06-14 | End: 2023-06-14 | Stop reason: HOSPADM

## 2023-06-14 RX ORDER — LIDOCAINE HYDROCHLORIDE 20 MG/ML
INJECTION, SOLUTION EPIDURAL; INFILTRATION; INTRACAUDAL; PERINEURAL AS NEEDED
Status: DISCONTINUED | OUTPATIENT
Start: 2023-06-14 | End: 2023-06-14 | Stop reason: SURG

## 2023-06-14 RX ORDER — HYDROCODONE BITARTRATE AND ACETAMINOPHEN 7.5; 325 MG/1; MG/1
1 TABLET ORAL EVERY 6 HOURS PRN
Qty: 20 TABLET | Refills: 0 | Status: SHIPPED | OUTPATIENT
Start: 2023-06-14 | End: 2023-06-24

## 2023-06-14 RX ORDER — PHENYLEPHRINE HCL IN 0.9% NACL 1 MG/10 ML
SYRINGE (ML) INTRAVENOUS AS NEEDED
Status: DISCONTINUED | OUTPATIENT
Start: 2023-06-14 | End: 2023-06-14 | Stop reason: SURG

## 2023-06-14 RX ORDER — SODIUM CHLORIDE 0.9 % (FLUSH) 0.9 %
10 SYRINGE (ML) INJECTION EVERY 12 HOURS SCHEDULED
Status: DISCONTINUED | OUTPATIENT
Start: 2023-06-14 | End: 2023-06-14 | Stop reason: HOSPADM

## 2023-06-14 RX ORDER — MIDAZOLAM HYDROCHLORIDE 1 MG/ML
0.5 INJECTION INTRAMUSCULAR; INTRAVENOUS
Status: DISCONTINUED | OUTPATIENT
Start: 2023-06-14 | End: 2023-06-14 | Stop reason: HOSPADM

## 2023-06-14 RX ORDER — PROPOFOL 10 MG/ML
INJECTION, EMULSION INTRAVENOUS AS NEEDED
Status: DISCONTINUED | OUTPATIENT
Start: 2023-06-14 | End: 2023-06-14 | Stop reason: SURG

## 2023-06-14 RX ORDER — PROMETHAZINE HYDROCHLORIDE 25 MG/1
25 SUPPOSITORY RECTAL ONCE AS NEEDED
Status: DISCONTINUED | OUTPATIENT
Start: 2023-06-14 | End: 2023-06-14 | Stop reason: HOSPADM

## 2023-06-14 RX ORDER — HYDROCODONE BITARTRATE AND ACETAMINOPHEN 7.5; 325 MG/1; MG/1
1 TABLET ORAL ONCE AS NEEDED
Status: DISCONTINUED | OUTPATIENT
Start: 2023-06-14 | End: 2023-06-14 | Stop reason: HOSPADM

## 2023-06-14 RX ORDER — PROMETHAZINE HYDROCHLORIDE 25 MG/1
25 TABLET ORAL ONCE AS NEEDED
Status: DISCONTINUED | OUTPATIENT
Start: 2023-06-14 | End: 2023-06-14 | Stop reason: HOSPADM

## 2023-06-14 RX ORDER — DIPHENHYDRAMINE HYDROCHLORIDE 50 MG/ML
12.5 INJECTION INTRAMUSCULAR; INTRAVENOUS ONCE AS NEEDED
Status: DISCONTINUED | OUTPATIENT
Start: 2023-06-14 | End: 2023-06-14 | Stop reason: HOSPADM

## 2023-06-14 RX ORDER — SODIUM CHLORIDE, SODIUM LACTATE, POTASSIUM CHLORIDE, CALCIUM CHLORIDE 600; 310; 30; 20 MG/100ML; MG/100ML; MG/100ML; MG/100ML
100 INJECTION, SOLUTION INTRAVENOUS CONTINUOUS
Status: DISCONTINUED | OUTPATIENT
Start: 2023-06-14 | End: 2023-06-15 | Stop reason: HOSPADM

## 2023-06-14 RX ADMIN — HYDROMORPHONE HYDROCHLORIDE 0.5 MG: 1 INJECTION, SOLUTION INTRAMUSCULAR; INTRAVENOUS; SUBCUTANEOUS at 00:00

## 2023-06-14 RX ADMIN — LIDOCAINE HYDROCHLORIDE 40 MG: 20 INJECTION, SOLUTION EPIDURAL; INFILTRATION; INTRACAUDAL; PERINEURAL at 17:47

## 2023-06-14 RX ADMIN — Medication 10 ML: at 20:43

## 2023-06-14 RX ADMIN — HYDROMORPHONE HYDROCHLORIDE 1 MG: 1 INJECTION, SOLUTION INTRAMUSCULAR; INTRAVENOUS; SUBCUTANEOUS at 11:01

## 2023-06-14 RX ADMIN — PROPOFOL 200 MG: 10 INJECTION, EMULSION INTRAVENOUS at 17:47

## 2023-06-14 RX ADMIN — MEPERIDINE HYDROCHLORIDE 12.5 MG: 25 INJECTION INTRAMUSCULAR; INTRAVENOUS; SUBCUTANEOUS at 18:57

## 2023-06-14 RX ADMIN — SENNOSIDES AND DOCUSATE SODIUM 2 TABLET: 50; 8.6 TABLET ORAL at 20:42

## 2023-06-14 RX ADMIN — CEFAZOLIN 2 G: 2 INJECTION, POWDER, FOR SOLUTION INTRAMUSCULAR; INTRAVENOUS at 17:50

## 2023-06-14 RX ADMIN — MIDAZOLAM 2 MG: 1 INJECTION INTRAMUSCULAR; INTRAVENOUS at 17:43

## 2023-06-14 RX ADMIN — HYDROMORPHONE HYDROCHLORIDE 0.5 MG: 1 INJECTION, SOLUTION INTRAMUSCULAR; INTRAVENOUS; SUBCUTANEOUS at 04:28

## 2023-06-14 RX ADMIN — FENTANYL CITRATE 100 MCG: 50 INJECTION, SOLUTION INTRAMUSCULAR; INTRAVENOUS at 17:47

## 2023-06-14 RX ADMIN — ONDANSETRON 4 MG: 2 INJECTION INTRAMUSCULAR; INTRAVENOUS at 18:00

## 2023-06-14 RX ADMIN — POTASSIUM CHLORIDE 40 MEQ: 1500 TABLET, EXTENDED RELEASE ORAL at 06:26

## 2023-06-14 RX ADMIN — PANTOPRAZOLE SODIUM 40 MG: 40 TABLET, DELAYED RELEASE ORAL at 05:30

## 2023-06-14 RX ADMIN — Medication 10 ML: at 09:07

## 2023-06-14 RX ADMIN — ROSUVASTATIN CALCIUM 10 MG: 10 TABLET, COATED ORAL at 20:43

## 2023-06-14 RX ADMIN — SODIUM CHLORIDE, SODIUM LACTATE, POTASSIUM CHLORIDE, AND CALCIUM CHLORIDE: .6; .31; .03; .02 INJECTION, SOLUTION INTRAVENOUS at 17:43

## 2023-06-14 RX ADMIN — HYDROMORPHONE HYDROCHLORIDE 0.5 MG: 1 INJECTION, SOLUTION INTRAMUSCULAR; INTRAVENOUS; SUBCUTANEOUS at 09:03

## 2023-06-14 RX ADMIN — HYDROMORPHONE HYDROCHLORIDE 1 MG: 1 INJECTION, SOLUTION INTRAMUSCULAR; INTRAVENOUS; SUBCUTANEOUS at 13:43

## 2023-06-14 RX ADMIN — INSULIN LISPRO 5 UNITS: 100 INJECTION, SOLUTION INTRAVENOUS; SUBCUTANEOUS at 13:44

## 2023-06-14 RX ADMIN — Medication 100 MCG: at 17:50

## 2023-06-14 RX ADMIN — SODIUM CHLORIDE, POTASSIUM CHLORIDE, SODIUM LACTATE AND CALCIUM CHLORIDE 100 ML/HR: 600; 310; 30; 20 INJECTION, SOLUTION INTRAVENOUS at 19:48

## 2023-06-14 NOTE — CONSULTS
Urology Consult Note    Patient:Herb Mariscal :1955  Room:[facility]  Admit Date2023  Age:67 y.o.     SEX:male     DOS:2023     MR:[id number]     Visit:[id number]       Attending: Price Neumann MD  Referring Provider: DR Neumann  Reason for Consultation: Right ureteral stones    Patient Care Team:  Caesar Limon MD as PCP - General (Family Medicine)    Chief complaint right flank pain    Subjective .     History of present illness: Patient is a 67-year-old white male who have seen in the past for BPH and stones.  He developed severe right flank pain radiating under his right groin area over the last couple days.  He came to the emergency room and underwent a CT scan which revealed bilateral renal stones that are nonobstructing and 2 right ureteral stones which are obstructing.  He denies any fever.  He wants the stones removed today if possible    Review of Systems  12 point review of systems were reviewed and are negative except for what is in HPI.    History  Past Medical History:   Diagnosis Date    Angina pectoris     Diabetes mellitus     type 2    Hyperlipidemia     Hypertension     Type 2 diabetes mellitus      Past Surgical History:   Procedure Laterality Date    HAND SURGERY      HAND SURGERY       Social History     Socioeconomic History    Marital status:      Spouse name: Odette    Number of children: 0    Years of education: 14   Tobacco Use    Smoking status: Former     Types: Cigarettes     Quit date:      Years since quittin.4    Smokeless tobacco: Never   Vaping Use    Vaping Use: Never used   Substance and Sexual Activity    Alcohol use: No    Drug use: No    Sexual activity: Defer     Family History   Problem Relation Age of Onset    Cancer Father     Diabetes Brother     Hyperlipidemia Mother     Diabetes Maternal Uncle      No Known Allergies  Prior to Admission medications    Medication Sig Start Date End Date Taking? Authorizing Provider   Cyanocobalamin  (VITAMIN B-12) 1000 MCG sublingual tablet Place 1,000 mcg under the tongue Daily. 3/17/20  Yes Sheila Morales MD   dapagliflozin Propanediol (Farxiga) 10 MG tablet Take 10 mg by mouth Daily. Take 1 tablet (10mg) by mouth daily. 22  Yes Sheila Morales MD   enalapril (VASOTEC) 20 MG tablet Take 1 tablet by mouth Daily. 22  Yes Sheila Morales MD   icosapent ethyl (VASCEPA) 1 g capsule capsule Take 2 g by mouth 2 (Two) Times a Day With Meals. 22  Yes Sheila Morales MD   omeprazole (priLOSEC) 20 MG capsule Take 1 capsule by mouth Daily. before a meal 21  Yes Sheila Morales MD   rosuvastatin (CRESTOR) 10 MG tablet TAKE 1 TABLET BY MOUTH EVERY DAY AT NIGHT 3/22/23  Yes Sheila Morales MD   Semaglutide, 2 MG/DOSE, (Ozempic, 2 MG/DOSE,) 8 MG/3ML solution pen-injector Inject 2 mg under the skin into the appropriate area as directed 1 (One) Time Per Week. Thur   Yes Sofi Chung MD   topiramate (TOPAMAX) 100 MG tablet Take 1 tablet by mouth 2 (Two) Times a Day. 21  Yes Sofi Chung MD   Tresiba FlexTouch 200 UNIT/ML solution pen-injector pen injection INJECT 120 UNITS UNDER THE SKIN INTO THE APPROPRIATE AREA AS DIRECTED DAILY. 3/3/23  Yes Sheila Morales MD         Objective     tMax 24 hours:  Temp (24hrs), Av.1 °F (36.7 °C), Min:98 °F (36.7 °C), Max:98.3 °F (36.8 °C)    Vital Sign Ranges:  Temp:  [98 °F (36.7 °C)-98.3 °F (36.8 °C)] 98 °F (36.7 °C)  Heart Rate:  [71-86] 80  Resp:  [18-20] 18  BP: (132-163)/(69-91) 147/69  Intake and Output Last 3 Shifts:  No intake/output data recorded.      Physical Exam:     General Appearance:    Alert, cooperative, in no acute distress   Head:    Normocephalic, without obvious abnormality, atraumatic   Eyes:            Lids and lashes normal, conjunctivae and sclerae normal, no   icterus, no pallor, corneas clear, PERRLA   Ears:    Ears appear intact with no abnormalities noted   Throat:   No oral lesions, no thrush, oral mucosa moist    Neck:   No adenopathy, supple, trachea midline, no thyromegaly, no   carotid bruit, no JVD   Back:     No kyphosis present, no scoliosis present, no skin lesions,      erythema or scars, no tenderness to percussion or                   palpation,   range of motion normal   Lungs:     Clear to auscultation,respirations regular, even and                  unlabored    Heart:    Regular rhythm and normal rate, normal S1 and S2, no            murmur, no gallop, no rub, no click   Chest Wall:    No abnormalities observed   Abdomen:     Normal bowel sounds, no masses, no organomegaly, soft        non-tender, non-distended, no guarding, no rebound                tenderness   Rectal:     Deferred   Extremities:   Moves all extremities well, no edema, no cyanosis, no             redness   Pulses:   Pulses palpable and equal bilaterally   Skin:   No bleeding, bruising or rash   Lymph nodes:   No palpable adenopathy   Neurologic:   Cranial nerves 2 - 12 grossly intact, sensation intact, DTR       present and equal bilaterally       Results Review:     Lab Results (last 24 hours)       Procedure Component Value Units Date/Time    C-reactive Protein [[id number]]  (Normal) Collected: 06/14/23 0500    Specimen: Blood Updated: 06/14/23 0612     C-Reactive Protein <0.30 mg/dL     Comprehensive Metabolic Panel [[id number]]  (Abnormal) Collected: 06/14/23 0500    Specimen: Blood Updated: 06/14/23 0558     Glucose 175 mg/dL      BUN 26 mg/dL      Creatinine 1.71 mg/dL      Sodium 141 mmol/L      Potassium 3.6 mmol/L      Chloride 107 mmol/L      CO2 19.0 mmol/L      Calcium 9.7 mg/dL      Total Protein 7.3 g/dL      Albumin 4.1 g/dL      ALT (SGPT) 39 U/L      AST (SGOT) 24 U/L      Alkaline Phosphatase 83 U/L      Total Bilirubin 0.5 mg/dL      Globulin 3.2 gm/dL      A/G Ratio 1.3 g/dL      BUN/Creatinine Ratio 15.2     Anion Gap 15.0 mmol/L      eGFR 43.9 mL/min/1.73     Narrative:      GFR Normal >60  Chronic Kidney Disease  "<60  Kidney Failure <15      Magnesium [[id number]]  (Normal) Collected: 06/14/23 0500    Specimen: Blood Updated: 06/14/23 0558     Magnesium 2.2 mg/dL     Phosphorus [[id number]]  (Normal) Collected: 06/14/23 0500    Specimen: Blood Updated: 06/14/23 0558     Phosphorus 3.9 mg/dL     Procalcitonin [[id number]]  (Normal) Collected: 06/14/23 0500    Specimen: Blood Updated: 06/14/23 0557     Procalcitonin 0.09 ng/mL     Narrative:      As a Marker for Sepsis (Non-Neonates):    1. <0.5 ng/mL represents a low risk of severe sepsis and/or septic shock.  2. >2 ng/mL represents a high risk of severe sepsis and/or septic shock.    As a Marker for Lower Respiratory Tract Infections that require antibiotic therapy:    PCT on Admission    Antibiotic Therapy       6-12 Hrs later    >0.5                Strongly Recommended  >0.25 - <0.5        Recommended   0.1 - 0.25          Discouraged              Remeasure/reassess PCT  <0.1                Strongly Discouraged     Remeasure/reassess PCT    As 28 day mortality risk marker: \"Change in Procalcitonin Result\" (>80% or <=80%) if Day 0 (or Day 1) and Day 4 values are available. Refer to http://www.Learn[employer]-pct-calculator.com    Change in PCT <=80%  A decrease of PCT levels below or equal to 80% defines a positive change in PCT test result representing a higher risk for 28-day all-cause mortality of patients diagnosed with severe sepsis for septic shock.    Change in PCT >80%  A decrease of PCT levels of more than 80% defines a negative change in PCT result representing a lower risk for 28-day all-cause mortality of patients diagnosed with severe sepsis or septic shock.       Sedimentation Rate [[id number]]  (Normal) Collected: 06/14/23 0500    Specimen: Blood Updated: 06/14/23 0553     Sed Rate 17 mm/hr     CBC & Differential [[id number]]  (Abnormal) Collected: 06/14/23 0500    Specimen: Blood Updated: 06/14/23 0527    Narrative:      The following orders were created for panel order " CBC & Differential.  Procedure                               Abnormality         Status                     ---------                               -----------         ------                     CBC Auto Differential[[id number]]        Abnormal            Final result                 Please view results for these tests on the individual orders.    CBC Auto Differential [[id number]]  (Abnormal) Collected: 06/14/23 0500    Specimen: Blood Updated: 06/14/23 0527     WBC 17.20 10*3/mm3      RBC 5.54 10*6/mm3      Hemoglobin 16.6 g/dL      Hematocrit 48.5 %      MCV 87.5 fL      MCH 30.0 pg      MCHC 34.3 g/dL      RDW 13.7 %      RDW-SD 44.2 fl      MPV 9.1 fL      Platelets 216 10*3/mm3      Neutrophil % 84.0 %      Lymphocyte % 8.2 %      Monocyte % 6.9 %      Eosinophil % 0.4 %      Basophil % 0.5 %      Neutrophils, Absolute 14.50 10*3/mm3      Lymphocytes, Absolute 1.40 10*3/mm3      Monocytes, Absolute 1.20 10*3/mm3      Eosinophils, Absolute 0.10 10*3/mm3      Basophils, Absolute 0.10 10*3/mm3      nRBC 0.3 /100 WBC     Basic Metabolic Panel [[id number]]  (Abnormal) Collected: 06/13/23 1355    Specimen: Blood Updated: 06/13/23 1505     Glucose 264 mg/dL      BUN 28 mg/dL      Creatinine 1.57 mg/dL      Sodium 141 mmol/L      Potassium 4.1 mmol/L      Comment: Slight hemolysis detected by analyzer. Results may be affected.        Chloride 108 mmol/L      CO2 20.0 mmol/L      Calcium 10.0 mg/dL      BUN/Creatinine Ratio 17.8     Anion Gap 13.0 mmol/L      eGFR 48.6 mL/min/1.73     Narrative:      GFR Normal >60  Chronic Kidney Disease <60  Kidney Failure <15      Urinalysis, Microscopic Only - Urine, Clean Catch [[id number]]  (Abnormal) Collected: 06/13/23 1355    Specimen: Urine, Clean Catch Updated: 06/13/23 1455     RBC, UA 13-20 /HPF      WBC, UA 0-2 /HPF      Bacteria, UA None Seen /HPF      Squamous Epithelial Cells, UA None Seen /HPF      Hyaline Casts, UA None Seen /LPF      Methodology Automated Microscopy     Urinalysis With Microscopic If Indicated (No Culture) - Urine, Clean Catch [[id number]]  (Abnormal) Collected: 06/13/23 1355    Specimen: Urine, Clean Catch Updated: 06/13/23 1455     Color, UA Yellow     Appearance, UA Clear     pH, UA <=5.0     Specific Gravity, UA 1.021     Glucose, UA >=1000 mg/dL (3+)     Ketones, UA Negative     Bilirubin, UA Negative     Blood, UA Large (3+)     Protein, UA Negative     Leuk Esterase, UA Negative     Nitrite, UA Negative     Urobilinogen, UA 0.2 E.U./dL    CBC & Differential [[id number]]  (Abnormal) Collected: 06/13/23 1355    Specimen: Blood Updated: 06/13/23 1446    Narrative:      The following orders were created for panel order CBC & Differential.  Procedure                               Abnormality         Status                     ---------                               -----------         ------                     CBC Auto Differential[[id number]]        Abnormal            Final result                 Please view results for these tests on the individual orders.    CBC Auto Differential [[id number]]  (Abnormal) Collected: 06/13/23 1355    Specimen: Blood Updated: 06/13/23 1446     WBC 15.30 10*3/mm3      RBC 5.54 10*6/mm3      Hemoglobin 16.7 g/dL      Hematocrit 50.1 %      MCV 90.5 fL      MCH 30.1 pg      MCHC 33.2 g/dL      RDW 13.4 %      RDW-SD 42.0 fl      MPV 8.8 fL      Platelets 229 10*3/mm3      Neutrophil % 89.1 %      Lymphocyte % 6.2 %      Monocyte % 4.0 %      Eosinophil % 0.4 %      Basophil % 0.3 %      Neutrophils, Absolute 13.70 10*3/mm3      Lymphocytes, Absolute 1.00 10*3/mm3      Monocytes, Absolute 0.60 10*3/mm3      Eosinophils, Absolute 0.10 10*3/mm3      Basophils, Absolute 0.00 10*3/mm3      nRBC 0.2 /100 WBC            No results found for: URINECX     Imaging Results (Last 7 Days)       Procedure Component Value Units Date/Time    CT Abdomen Pelvis Without Contrast [[id number]] Collected: 06/13/23 1501     Updated: 06/13/23 150     Narrative:      CT ABDOMEN PELVIS WO CONTRAST    Date of Exam: 6/13/2023 2:55 PM EDT    Indication: Abdominal pain, acute, nonlocalized.    Comparison: None available.    Technique: Axial CT images were obtained of the abdomen and pelvis without the administration of contrast. Sagittal and coronal reconstructions were performed.  Automated exposure control and iterative reconstruction methods were used.      Findings: Limited evaluation of the lung bases demonstrates no gross abnormality.    The liver is unremarkable.    There are no radio opaque gallbladder calculi.    The spleen is unremarkable.    The pancreases is unremarkable.    There are no adrenal nodules.    There is mild right hydronephrosis with right perinephric stranding perinephric fluid. The right ureter is dilated. On image 93 of series 2 there is a 4.7 mm calculi in the mid aspect of the right ureter. On image 126 of series 2 there is a second 2 mm   calculi in the distal right ureter just proximal to the ureterovesical junction. There are several nonobstructing bilateral renal calculi. There is no left hydronephrosis.      There is no small bowel obstruction .    The appendix is unremarkable.    Evaluation of the colon is limited by lack of oral contrast and under distention but demonstrates no gross abnormality.    The urinary bladder is poorly evaluated due to poor distention. There is urinary bladder wall thickening which may be artifactual due to under distention versus cystitis.    7  There is no pelvic free fluid.    The osseous structures are within normal limits.    IMPESSION :    Mild right hydronephrosis. There are 2 calculi in the right ureter more proximal measuring 4.7 mm the more distal measuring 2 mm just proximal to the right ureterovesical junction.    Right perinephric stranding perinephric fluid.    Nonobstructing bilateral renal calculi.    Wall thickening of the urinary bladder which could be artifactual due to under distention  versus cystitis.                    Electronically Signed: Luiz Troncoso    6/13/2023 3:06 PM EDT    Workstation ID: [id number]            Inpatient Meds:   Scheduled Meds:insulin lispro, 3-14 Units, Subcutaneous, TID With Meals  pantoprazole, 40 mg, Oral, Q AM  potassium chloride ER, 40 mEq, Oral, Q4H  rosuvastatin, 10 mg, Oral, Nightly  senna-docusate sodium, 2 tablet, Oral, BID  sodium chloride, 10 mL, Intravenous, Q12H       Continuous Infusions:    PRN Meds:.  acetaminophen **OR** acetaminophen **OR** acetaminophen    aluminum-magnesium hydroxide-simethicone    senna-docusate sodium **AND** polyethylene glycol **AND** bisacodyl **AND** bisacodyl    Calcium Replacement - Follow Nurse / BPA Driven Protocol    dextrose    dextrose    glucagon (human recombinant)    hydrALAZINE    HYDROmorphone    Magnesium Standard Dose Replacement - Follow Nurse / BPA Driven Protocol    melatonin    naloxone    ondansetron **OR** ondansetron    Phosphorus Replacement - Follow Nurse / BPA Driven Protocol    Potassium Replacement - Follow Nurse / BPA Driven Protocol    [COMPLETED] Insert Peripheral IV **AND** sodium chloride    sodium chloride    sodium chloride      Assessment & Plan     Right ureteral calculi    Plan cystoscopy right ureteroscopy laser lithotripsy stone extraction and stent placement.  Discussed with patient all risk benefits and options and he is willing to proceed.    Bilateral renal calculi which we will continue to watch    BPH which we will continue to watch    I discussed the patient's findings and my recommendations with patient.    Ant Virk MD  06/14/23  07:00 EDT

## 2023-06-14 NOTE — OP NOTE
Urology Operative Note    6/14/2023    Herb Mariscal  67 y.o.  1955  male  [id number]      Surgeon(s) and Role:  Han Garibay MD - Primary     Pre-operative Diagnosis: 2 mm right UVJ stone, 5 mm right mid ureteral stone    Post-operative Diagnosis: Same    Complications: None    Procedures:  Cystoscopy  Retrograde pyelogram  Right ureteroscopy  Basket-extraction of stone   Stent placement  Fluoroscopy with Interpretation     Indications   Herb Mariscal is a 67 y.o. male who was found to have multiple right ureteral calculi admitted and added on for surgery    During the informed consent process, the procedure was discussed in detail including the risks of bleeding, infection, damage to surrounding structures and need for staged procedure.      Findings     Fluoroscopy with interpretation: Right retrograde pyelogram showing narrow proximal ureter with mild to moderate hydronephrosis.  Stent placed with curl in the renal pelvis.    Description of procedure:  The patient was properly identified in the preoperative holding area and taken to the operating room where general anesthesia was induced. The patient was placed in the cystolithotomy position and prepped and draped in a sterile fashion. The patient was given antibiotics intravenously before the start of the surgery. After ensuring that all of the required equipment was ready and available a surgical timeout was performed.     A 21 Spanish rigid cystoscope was inserted into the bladder under direct visualization.   A Sensor wire was passed up the ureter under fluoroscopic guidance.  Dual-lumen was used to dilate the UVJ.  Semirigid ureteroscope was passed into the ureter. The distal stone was visualized.   Basket was used to clear it.  The scope could not be navigated up beyond the mid ureter, ureteral dilation was attempted but unsuccessful retrograde pyelogram showed narrowing no extravasation.  The cystoscope was then replaced over the wire and a 7  Macedonian x multi-cm stent was placed under direct and fluoroscopic visualization.  The bladder was then emptied and scope removed.    There were no apparent complications. The patient woke up in the operating room and was taken to the recovery room in stable condition.     I was present and scrubbed for the entire procedure.     Specimens: None    Estimated Blood Loss: minimal      Plan   -He will need repeat right ureteroscopy to clear right mid ureteral stone in 1 to 2 weeks         Han Garibay MD  [facility]  [facility], [address]  [place] [address] [phone]

## 2023-06-14 NOTE — ANESTHESIA PROCEDURE NOTES
Airway  Urgency: elective    Date/Time: 6/14/2023 5:48 PM  Airway not difficult    General Information and Staff    Patient location during procedure: OR  CRNA/CAA: Yahaira Luther CRNA    Indications and Patient Condition  Indications for airway management: airway protection    Preoxygenated: yes  Mask difficulty assessment: 0 - not attempted    Final Airway Details  Final airway type: supraglottic airway      Successful airway: LMA  Size 4     Number of attempts at approach: 1  Assessment: lips, teeth, and gum same as pre-op and atraumatic intubation

## 2023-06-14 NOTE — PROGRESS NOTES
[facility] Medicine Services Daily Progress Note    Patient Name: Herb Mariscal  : 1955  MRN: [id number]  Primary Care Physician:  Caesar Limon MD  Date of admission: 2023      Subjective      Chief Complaint: Flank pain      Patient Reports he is having a lot of pain.  Pain is uncontrolled.  Dilaudid doubled to 1 mg.    ROS negative except as above      Objective      Vitals:   Temp:  [98 °F (36.7 °C)-98.3 °F (36.8 °C)] 98 °F (36.7 °C)  Heart Rate:  [76-86] 80  Resp:  [18-20] 18  BP: (132-156)/(69-91) 147/69    Physical Exam  Vitals reviewed.   Constitutional:       Comments: Family at bedside   HENT:      Head: Normocephalic.   Cardiovascular:      Rate and Rhythm: Normal rate.   Pulmonary:      Effort: Pulmonary effort is normal.   Abdominal:      General: Abdomen is flat.      Palpations: Abdomen is soft.   Musculoskeletal:         General: Normal range of motion.      Cervical back: Normal range of motion.   Skin:     General: Skin is warm.   Neurological:      General: No focal deficit present.      Mental Status: He is alert and oriented to person, place, and time.   Psychiatric:         Mood and Affect: Mood normal.         Behavior: Behavior normal.           Result Review    Result Review:  I have personally reviewed the results from the time of this admission to 2023 14:49 EDT and agree with these findings:  [x]  Laboratory  []  Microbiology  []  Radiology  []  EKG/Telemetry   []  Cardiology/Vascular   []  Pathology  []  Old records  []  Other:  Most notable findings include: Creatinine 1.71 white count 17.2          Assessment & Plan      Brief Patient Summary:  Herb Mariscal is a 67 y.o. male who presents with flank pain and found to have ureteral calculus      insulin lispro, 3-14 Units, Subcutaneous, TID With Meals  pantoprazole, 40 mg, Oral, Q AM  potassium chloride ER, 40 mEq, Oral, Q4H  rosuvastatin, 10 mg, Oral, Nightly  senna-docusate sodium, 2  tablet, Oral, BID  sodium chloride, 10 mL, Intravenous, Q12H             Active Hospital Problems:  Active Hospital Problems    Diagnosis     **Ureteral calculus      Plan:      Ureteral calculus  Ureteral colic  - CT abdomen/pelvis reviewed, significant for 2 calculi in the right ureter more proximal measuring 4.7 mm the more distal measuring 2 mm just proximal to the right ureterovesical junction  - UA reviewed, showed +3 blood, 13-20 red blood cells, 0-2 WBC, negative nitrates negative leukocytes, no bacteria seen  - pain and antiemetics ordered  - NPO   - Urology consulted   -Pending cystoscopy     Acute Kidney Injury  - likely post-renal due to ureteral calculus  - BUN 28  - Creatinine 1.57 (baseline around 1.0)  - eGFR 48.6  - Monitor I's and O's  - Monitor BMP  - Avoid Nephrotoxic medications and IV dye unless urgently needed      Essential Hypertension  - chronic  - BP on admission 163/83  - Monitor BP while admitted  - Hold enalapril due to CHEMA  - Hydralazine ordered PRN      Hyperlipidemia  - Continue statin      Type 2 diabetes mellitus  - Glucose on admission 264  - Hemoglobin A1c 8.8 (02/21/2023)  - Hold home medications  - Start SSI  - Glucose checks ACHS           DVT prophylaxis: Mechanical SCDs        CODE STATUS:    Level Of Support Discussed With: Patient  Code Status (Patient has no pulse and is not breathing): CPR (Attempt to Resuscitate)  Medical Interventions (Patient has pulse or is breathing): Full Support     Admission Status:  I believe this patient meets observation status.     I discussed the patient's findings and my recommendations with patient and family.     This patient has been examined wearing appropriate Personal Protective Equipment 06/14/23      Electronically signed by Price Neumann MD, 06/14/23, 14:49 EDT.  [facility]ist Team

## 2023-06-14 NOTE — CASE MANAGEMENT/SOCIAL WORK
Continued Stay Note  [facility]     Patient Name: Herb Mariscal  MRN: [id number]  Today's Date: 6/14/2023    Admit Date: 6/13/2023    Plan: Anticipate Routine Home   Discharge Plan       Row Name 06/14/23 1007       Plan    Plan Comments Barrier: Today:CYSTOSCOPY URETEROSCOPY RETROGRADE PYELOGRAM HOLMIUM LASER STENT INSERTION. Plans to return home at discharge                 Phone communication or documentation only - no physical contact with patient or family.     Yahaira SIGALA,RN Case Manager  [facility]  Phone: Desk- [phone] cell- [phone]

## 2023-06-14 NOTE — ANESTHESIA PREPROCEDURE EVALUATION
Anesthesia Evaluation     Patient summary reviewed and Nursing notes reviewed   NPO Solid Status: > 8 hours  NPO Liquid Status: > 2 hours           Airway   Mallampati: III  TM distance: >3 FB  Large neck circumference  Dental      Pulmonary - negative pulmonary ROS and normal exam    breath sounds clear to auscultation  Cardiovascular - normal exam    ECG reviewed  Rhythm: regular  Rate: normal    (+) hypertensionangina, hyperlipidemia      Neuro/Psych  (+) numbness  GI/Hepatic/Renal/Endo    (+) obesity, morbid obesity, diabetes mellitus    Musculoskeletal     Abdominal   (+) obese   Substance History      OB/GYN          Other                      Anesthesia Plan    ASA 3     general     intravenous induction     Anesthetic plan, risks, benefits, and alternatives have been provided, discussed and informed consent has been obtained with: patient.  Pre-procedure education provided  Plan discussed with CRNA.    CODE STATUS:    Level Of Support Discussed With: Patient  Code Status (Patient has no pulse and is not breathing): CPR (Attempt to Resuscitate)  Medical Interventions (Patient has pulse or is breathing): Full Support

## 2023-06-14 NOTE — PLAN OF CARE
Goal Outcome Evaluation:  Plan of Care Reviewed With: patient        Progress: no change          Patient complaints of right sided abdominal pain, PRN medication given as ordered reported relief and noted to be sleeping afterwards. On NPO after midnight, scheduled for Cystoscopy Ureteroscopy today. Will continue to monitor.

## 2023-06-15 VITALS
TEMPERATURE: 98.4 F | RESPIRATION RATE: 16 BRPM | WEIGHT: 213.41 LBS | OXYGEN SATURATION: 96 % | HEIGHT: 65 IN | BODY MASS INDEX: 35.56 KG/M2 | SYSTOLIC BLOOD PRESSURE: 148 MMHG | DIASTOLIC BLOOD PRESSURE: 84 MMHG | HEART RATE: 92 BPM

## 2023-06-15 LAB
ALBUMIN SERPL-MCNC: 4 G/DL (ref 3.5–5.2)
ALBUMIN/GLOB SERPL: 1.3 G/DL
ALP SERPL-CCNC: 82 U/L (ref 39–117)
ALT SERPL W P-5'-P-CCNC: 30 U/L (ref 1–41)
ANION GAP SERPL CALCULATED.3IONS-SCNC: 11 MMOL/L (ref 5–15)
AST SERPL-CCNC: 20 U/L (ref 1–40)
BASOPHILS # BLD AUTO: 0.1 10*3/MM3 (ref 0–0.2)
BASOPHILS NFR BLD AUTO: 0.3 % (ref 0–1.5)
BILIRUB SERPL-MCNC: 0.7 MG/DL (ref 0–1.2)
BUN SERPL-MCNC: 24 MG/DL (ref 8–23)
BUN/CREAT SERPL: 18.8 (ref 7–25)
CALCIUM SPEC-SCNC: 9.7 MG/DL (ref 8.6–10.5)
CHLORIDE SERPL-SCNC: 106 MMOL/L (ref 98–107)
CO2 SERPL-SCNC: 25 MMOL/L (ref 22–29)
CREAT SERPL-MCNC: 1.28 MG/DL (ref 0.76–1.27)
DEPRECATED RDW RBC AUTO: 44.6 FL (ref 37–54)
EGFRCR SERPLBLD CKD-EPI 2021: 62.1 ML/MIN/1.73
EOSINOPHIL # BLD AUTO: 0.1 10*3/MM3 (ref 0–0.4)
EOSINOPHIL NFR BLD AUTO: 0.4 % (ref 0.3–6.2)
ERYTHROCYTE [DISTWIDTH] IN BLOOD BY AUTOMATED COUNT: 13.7 % (ref 12.3–15.4)
GLOBULIN UR ELPH-MCNC: 3.2 GM/DL
GLUCOSE BLDC GLUCOMTR-MCNC: 149 MG/DL (ref 70–105)
GLUCOSE BLDC GLUCOMTR-MCNC: 209 MG/DL (ref 70–105)
GLUCOSE SERPL-MCNC: 141 MG/DL (ref 65–99)
HCT VFR BLD AUTO: 46 % (ref 37.5–51)
HGB BLD-MCNC: 15.6 G/DL (ref 13–17.7)
LYMPHOCYTES # BLD AUTO: 1.8 10*3/MM3 (ref 0.7–3.1)
LYMPHOCYTES NFR BLD AUTO: 11.1 % (ref 19.6–45.3)
MAGNESIUM SERPL-MCNC: 2.2 MG/DL (ref 1.6–2.4)
MCH RBC QN AUTO: 29.8 PG (ref 26.6–33)
MCHC RBC AUTO-ENTMCNC: 33.9 G/DL (ref 31.5–35.7)
MCV RBC AUTO: 88.1 FL (ref 79–97)
MONOCYTES # BLD AUTO: 1.5 10*3/MM3 (ref 0.1–0.9)
MONOCYTES NFR BLD AUTO: 9.2 % (ref 5–12)
NEUTROPHILS NFR BLD AUTO: 12.6 10*3/MM3 (ref 1.7–7)
NEUTROPHILS NFR BLD AUTO: 79 % (ref 42.7–76)
NRBC BLD AUTO-RTO: 0.1 /100 WBC (ref 0–0.2)
PHOSPHATE SERPL-MCNC: 2.7 MG/DL (ref 2.5–4.5)
PLATELET # BLD AUTO: 224 10*3/MM3 (ref 140–450)
PMV BLD AUTO: 8.8 FL (ref 6–12)
POTASSIUM SERPL-SCNC: 3.7 MMOL/L (ref 3.5–5.2)
PROT SERPL-MCNC: 7.2 G/DL (ref 6–8.5)
RBC # BLD AUTO: 5.23 10*6/MM3 (ref 4.14–5.8)
SODIUM SERPL-SCNC: 142 MMOL/L (ref 136–145)
WBC NRBC COR # BLD: 15.9 10*3/MM3 (ref 3.4–10.8)

## 2023-06-15 PROCEDURE — 63710000001 INSULIN LISPRO (HUMAN) PER 5 UNITS: Performed by: UROLOGY

## 2023-06-15 PROCEDURE — 84100 ASSAY OF PHOSPHORUS: CPT | Performed by: UROLOGY

## 2023-06-15 PROCEDURE — 82948 REAGENT STRIP/BLOOD GLUCOSE: CPT

## 2023-06-15 PROCEDURE — 83735 ASSAY OF MAGNESIUM: CPT | Performed by: UROLOGY

## 2023-06-15 PROCEDURE — 85025 COMPLETE CBC W/AUTO DIFF WBC: CPT | Performed by: UROLOGY

## 2023-06-15 PROCEDURE — G0378 HOSPITAL OBSERVATION PER HR: HCPCS

## 2023-06-15 PROCEDURE — 80053 COMPREHEN METABOLIC PANEL: CPT | Performed by: UROLOGY

## 2023-06-15 RX ADMIN — PANTOPRAZOLE SODIUM 40 MG: 40 TABLET, DELAYED RELEASE ORAL at 05:48

## 2023-06-15 RX ADMIN — Medication 10 ML: at 08:58

## 2023-06-15 RX ADMIN — SENNOSIDES AND DOCUSATE SODIUM 2 TABLET: 50; 8.6 TABLET ORAL at 08:55

## 2023-06-15 RX ADMIN — INSULIN LISPRO 5 UNITS: 100 INJECTION, SOLUTION INTRAVENOUS; SUBCUTANEOUS at 08:54

## 2023-06-15 RX ADMIN — ACETAMINOPHEN 650 MG: 325 TABLET, FILM COATED ORAL at 08:54

## 2023-06-15 RX ADMIN — SODIUM CHLORIDE, POTASSIUM CHLORIDE, SODIUM LACTATE AND CALCIUM CHLORIDE 100 ML/HR: 600; 310; 30; 20 INJECTION, SOLUTION INTRAVENOUS at 01:14

## 2023-06-15 NOTE — CASE MANAGEMENT/SOCIAL WORK
Case Management Discharge Note      Final Note: Routine home                    Transportation Services  Private: Car    Final Discharge Disposition Code: 01 - home or self-care

## 2023-06-15 NOTE — DISCHARGE SUMMARY
[facility] Medicine Services  DISCHARGE SUMMARY    Patient Name: Herb Mariscal  : 1955  MRN: [id number]    Discharge condition: Stabilized  Date of Admission: 2023  Discharge Diagnosis: Ureterolithiasis  Date of Discharge: 06/15/2023  Primary Care Physician: Caesar Limon MD      Presenting Problem:   Ureteral calculus [N20.1]  Ureteral colic [N23]    Active and Resolved Hospital Problems:  Active Hospital Problems    Diagnosis POA    **Ureteral calculus [N20.1] Yes      Resolved Hospital Problems   No resolved problems to display.         Hospital Course     Hospital Course:  Herb Mariscal is a 67 y.o. male who presents to the hospital with flank pain.  The patient was evaluated and found to have ureteral calculus.  He was having severe ureteral colic.   Patient was seen by urology and taken for cystoscopy.  Stent was placed.  The patient was feeling a lot better and he was discharged home with pain medicine and antibiotics with close follow-up instructions.              Reasons For Change In Medications and Indications for New Medications:      Day of Discharge     Vital Signs:  Temp:  [98.4 °F (36.9 °C)-99.4 °F (37.4 °C)] 98.4 °F (36.9 °C)  Heart Rate:  [77-92] 92  Resp:  [10-16] 16  BP: (113-154)/(63-91) 148/84  Flow (L/min):  [0-6] 0    Physical Exam:  Physical Exam  Vitals reviewed.   HENT:      Head: Normocephalic.   Cardiovascular:      Rate and Rhythm: Normal rate.   Pulmonary:      Effort: Pulmonary effort is normal.   Abdominal:      General: Abdomen is flat.      Palpations: Abdomen is soft.   Musculoskeletal:         General: Normal range of motion.      Cervical back: Normal range of motion.   Skin:     General: Skin is warm.   Neurological:      General: No focal deficit present.      Mental Status: He is alert and oriented to person, place, and time.   Psychiatric:         Mood and Affect: Mood normal.         Behavior: Behavior normal.           Pertinent  and/or Most Recent Results     LAB RESULTS:      Lab 06/15/23  0326 06/14/23  0500 06/13/23  1355   WBC 15.90* 17.20* 15.30*   HEMOGLOBIN 15.6 16.6 16.7   HEMATOCRIT 46.0 48.5 50.1   PLATELETS 224 216 229   NEUTROS ABS 12.60* 14.50* 13.70*   LYMPHS ABS 1.80 1.40 1.00   MONOS ABS 1.50* 1.20* 0.60   EOS ABS 0.10 0.10 0.10   MCV 88.1 87.5 90.5   SED RATE  --  17  --    CRP  --  <0.30  --    PROCALCITONIN  --  0.09  --          Lab 06/15/23  0326 06/14/23  1505 06/14/23  0500 06/13/23  1355   SODIUM 142  --  141 141   POTASSIUM 3.7 4.2 3.6 4.1   CHLORIDE 106  --  107 108*   CO2 25.0  --  19.0* 20.0*   ANION GAP 11.0  --  15.0 13.0   BUN 24*  --  26* 28*   CREATININE 1.28*  --  1.71* 1.57*   EGFR 62.1  --  43.9* 48.6*   GLUCOSE 141*  --  175* 264*   CALCIUM 9.7  --  9.7 10.0   MAGNESIUM 2.2  --  2.2  --    PHOSPHORUS 2.7  --  3.9  --          Lab 06/15/23  0326 06/14/23  0500   TOTAL PROTEIN 7.2 7.3   ALBUMIN 4.0 4.1   GLOBULIN 3.2 3.2   ALT (SGPT) 30 39   AST (SGOT) 20 24   BILIRUBIN 0.7 0.5   ALK PHOS 82 83                     Brief Urine Lab Results  (Last result in the past 365 days)        Color   Clarity   Blood   Leuk Est   Nitrite   Protein   CREAT   Urine HCG        06/13/23 1355 Yellow   Clear   Large (3+)   Negative   Negative   Negative                 Microbiology Results (last 10 days)       ** No results found for the last 240 hours. **                             Labs Pending at Discharge:      Procedures Performed  Procedure(s):  CYSTOSCOPY URETEROSCOPY, STONE BASKET EXTRACTION, RIGHT STENT INSERTION         Consults:   Consults       Date and Time Order Name Status Description    6/13/2023  3:37 PM Hospitalist (on-call MD unless specified)      6/13/2023  3:11 PM Urology (on-call MD unless specified) Completed               Discharge Details        Discharge Medications        New Medications        Instructions Start Date   cephalexin 500 MG capsule  Commonly known as: KEFLEX   500 mg,  Oral, 3 Times Daily      docusate sodium 100 MG capsule  Commonly known as: Colace   100 mg, Oral, 2 Times Daily PRN      HYDROcodone-acetaminophen 7.5-325 MG per tablet  Commonly known as: NORCO   1 tablet, Oral, Every 6 Hours PRN             Continue These Medications        Instructions Start Date   enalapril 20 MG tablet  Commonly known as: VASOTEC   20 mg, Oral, Daily      Farxiga 10 MG tablet  Generic drug: dapagliflozin Propanediol   10 mg, Oral, Daily, Take 1 tablet (10mg) by mouth daily.      icosapent ethyl 1 g capsule capsule  Commonly known as: VASCEPA   2 g, Oral, 2 Times Daily With Meals      omeprazole 20 MG capsule  Commonly known as: priLOSEC   20 mg, Oral, Daily, before a meal      Ozempic (2 MG/DOSE) 8 MG/3ML solution pen-injector  Generic drug: Semaglutide (2 MG/DOSE)   2 mg, Subcutaneous, Weekly, Thur       rosuvastatin 10 MG tablet  Commonly known as: CRESTOR   TAKE 1 TABLET BY MOUTH EVERY DAY AT NIGHT      topiramate 100 MG tablet  Commonly known as: TOPAMAX   100 mg, Oral, 2 Times Daily      Tresiba FlexTouch 200 UNIT/ML solution pen-injector pen injection  Generic drug: Insulin Degludec   120 Units, Subcutaneous, Daily      Vitamin B-12 1000 MCG sublingual tablet   1,000 mcg, Sublingual, Daily               No Known Allergies      Discharge Disposition:   Home or Self Care    Diet:  Hospital:  Diet Order   Procedures    Diet: Regular/House Diet; Texture: Regular Texture (IDDSI 7); Fluid Consistency: Thin (IDDSI 0)         Discharge Activity:         CODE STATUS:  Code Status and Medical Interventions:   Ordered at: 06/13/23 3597     Level Of Support Discussed With:    Patient     Code Status (Patient has no pulse and is not breathing):    CPR (Attempt to Resuscitate)     Medical Interventions (Patient has pulse or is breathing):    Full Support         Future Appointments   Date Time Provider Department Center   9/19/2023  7:30 AM LAB  [facility]  [facility] None   9/26/2023  8:30  Sheila Tinoco MD [facility]           Time spent on Discharge including face to face service:  35 minutes    This patient has been examined wearing appropriate Personal Protective Equipment and discussed with  patient and family . 06/15/23      Signature: Price Neumann MD

## 2023-06-15 NOTE — ANESTHESIA POSTPROCEDURE EVALUATION
Patient: Herb Mariscal    Procedure Summary       Date: 06/14/23 Room / Location: [facility] OR 01 / [facility] MAIN OR    Anesthesia Start: 1743 Anesthesia Stop: 1816    Procedure: CYSTOSCOPY URETEROSCOPY, STONE BASKET EXTRACTION, RIGHT STENT INSERTION (Right) Diagnosis:     Surgeons: Han Garibay MD Provider: Miles Wheatley MD    Anesthesia Type: general ASA Status: 3            Anesthesia Type: general    Vitals  Vitals Value Taken Time   /79 06/14/23 1905   Temp 98.7 °F (37.1 °C) 06/14/23 1900   Pulse 83 06/14/23 1908   Resp 10 06/14/23 1900   SpO2 93 % 06/14/23 1908   Vitals shown include unvalidated device data.        Post Anesthesia Care and Evaluation    Patient location during evaluation: PACU  Patient participation: complete - patient participated  Level of consciousness: awake  Pain scale: See nurse's notes for pain score.  Pain management: adequate    Airway patency: patent  Anesthetic complications: No anesthetic complications  PONV Status: none  Cardiovascular status: acceptable  Respiratory status: acceptable and spontaneous ventilation  Hydration status: acceptable    Comments: Patient seen and examined postoperatively; vital signs stable; SpO2 greater than or equal to 90%; cardiopulmonary status stable; nausea/vomiting adequately controlled; pain adequately controlled; no apparent anesthesia complications; patient discharged from anesthesia care when discharge criteria were met

## 2023-06-15 NOTE — CASE MANAGEMENT/SOCIAL WORK
Continued Stay Note   Andrez     Patient Name: Herb Mariscal  MRN: [id number]  Today's Date: 6/15/2023    Admit Date: 6/13/2023    Plan: Return home   Discharge Plan       Row Name 06/15/23 1030       Plan    Plan Return home    Patient/Family in Agreement with Plan yes    Plan Comments Barriers: IVFs; pain management; Urology following. Mr. Mariscal is on Regular diet. He plans to return home at discharge. No anticipated discharge needs                 Phone communication or documentation only - no physical contact with patient or family.   Yahaira SIGALA,RN Case Manager  [facility]  Phone: Desk- [phone]  Cell- [phone]

## 2023-06-15 NOTE — PLAN OF CARE
Problem: Adult Inpatient Plan of Care  Goal: Absence of Hospital-Acquired Illness or Injury  Intervention: Identify and Manage Fall Risk  Recent Flowsheet Documentation  Taken 6/15/2023 0205 by Bismark Lock RN  Safety Promotion/Fall Prevention:   assistive device/personal items within reach   clutter free environment maintained   fall prevention program maintained   nonskid shoes/slippers when out of bed   room organization consistent   safety round/check completed  Taken 6/15/2023 0007 by Bismark Lock RN  Safety Promotion/Fall Prevention:   assistive device/personal items within reach   clutter free environment maintained   fall prevention program maintained   nonskid shoes/slippers when out of bed   room organization consistent   safety round/check completed  Taken 6/14/2023 2206 by Bismark Lock RN  Safety Promotion/Fall Prevention:   assistive device/personal items within reach   clutter free environment maintained   fall prevention program maintained   nonskid shoes/slippers when out of bed   room organization consistent   safety round/check completed  Taken 6/14/2023 2017 by Bismark Lock RN  Safety Promotion/Fall Prevention:   assistive device/personal items within reach   clutter free environment maintained   fall prevention program maintained   nonskid shoes/slippers when out of bed   room organization consistent   safety round/check completed  Intervention: Prevent Skin Injury  Recent Flowsheet Documentation  Taken 6/15/2023 0205 by Bismark Lock RN  Body Position: position changed independently  Taken 6/15/2023 0007 by Bismark Lock RN  Body Position: position changed independently  Taken 6/14/2023 2206 by Bismark Lock RN  Body Position: position changed independently  Taken 6/14/2023 2017 by Bismark Lock RN  Body Position: position changed independently  Skin Protection: adhesive use limited  Intervention: Prevent and Manage VTE (Venous Thromboembolism) Risk  Recent  Flowsheet Documentation  Taken 6/14/2023 2017 by Bismark Lock RN  VTE Prevention/Management:   sequential compression devices off   patient refused intervention  Range of Motion:   active ROM (range of motion) encouraged   ROM (range of motion) performed  Intervention: Prevent Infection  Recent Flowsheet Documentation  Taken 6/15/2023 0205 by Bismark Lock RN  Infection Prevention:   environmental surveillance performed   equipment surfaces disinfected   hand hygiene promoted   personal protective equipment utilized   rest/sleep promoted   single patient room provided  Taken 6/15/2023 0007 by Bismark Lock RN  Infection Prevention:   environmental surveillance performed   equipment surfaces disinfected   hand hygiene promoted   personal protective equipment utilized   rest/sleep promoted   single patient room provided  Taken 6/14/2023 2206 by Bismark Lock RN  Infection Prevention:   environmental surveillance performed   equipment surfaces disinfected   hand hygiene promoted   personal protective equipment utilized   rest/sleep promoted   single patient room provided  Taken 6/14/2023 2017 by Bismark Lock RN  Infection Prevention:   environmental surveillance performed   equipment surfaces disinfected   hand hygiene promoted   personal protective equipment utilized   rest/sleep promoted   single patient room provided  Goal: Optimal Comfort and Wellbeing  Intervention: Monitor Pain and Promote Comfort  Recent Flowsheet Documentation  Taken 6/15/2023 0007 by Bismark Lock RN  Pain Management Interventions:   see MAR   quiet environment facilitated  Taken 6/14/2023 2017 by Bismark Lock RN  Pain Management Interventions:   see MAR   quiet environment facilitated  Intervention: Provide Person-Centered Care  Recent Flowsheet Documentation  Taken 6/14/2023 2017 by Bismark Lock RN  Trust Relationship/Rapport: care explained   Goal Outcome Evaluation:      Pt is alert and oriented. Pt had  cystoscopy procedure done yesterday. No new complaint noted at this time.

## 2023-06-24 PROBLEM — N13.30 HYDRONEPHROSIS OF RIGHT KIDNEY: Status: ACTIVE | Noted: 2023-06-24

## 2023-06-24 PROBLEM — D72.829 LEUKOCYTOSIS: Status: ACTIVE | Noted: 2023-06-24

## 2023-06-24 PROBLEM — N17.9 ACUTE KIDNEY INJURY: Status: ACTIVE | Noted: 2023-06-24

## 2023-07-05 ENCOUNTER — HOSPITAL ENCOUNTER (OUTPATIENT)
Dept: HOSPITAL 83 - RESCLI | Age: 68
Discharge: HOME | End: 2023-07-05
Attending: INTERNAL MEDICINE
Payer: COMMERCIAL

## 2023-07-05 DIAGNOSIS — Z79.4: ICD-10-CM

## 2023-07-05 DIAGNOSIS — R60.9: ICD-10-CM

## 2023-07-05 DIAGNOSIS — Z98.890: ICD-10-CM

## 2023-07-05 DIAGNOSIS — Z79.82: ICD-10-CM

## 2023-07-05 DIAGNOSIS — Z79.899: ICD-10-CM

## 2023-07-05 DIAGNOSIS — Z82.49: ICD-10-CM

## 2023-07-05 DIAGNOSIS — Z87.891: ICD-10-CM

## 2023-07-05 DIAGNOSIS — Z96.651: ICD-10-CM

## 2023-07-05 DIAGNOSIS — E03.9: ICD-10-CM

## 2023-07-05 DIAGNOSIS — Z90.49: ICD-10-CM

## 2023-07-05 DIAGNOSIS — F32.4: ICD-10-CM

## 2023-07-05 DIAGNOSIS — R41.81: ICD-10-CM

## 2023-07-05 DIAGNOSIS — I10: ICD-10-CM

## 2023-07-05 DIAGNOSIS — E78.2: ICD-10-CM

## 2023-07-05 DIAGNOSIS — K21.9: ICD-10-CM

## 2023-07-05 DIAGNOSIS — M62.838: ICD-10-CM

## 2023-07-05 DIAGNOSIS — E11.9: Primary | ICD-10-CM

## 2023-07-05 DIAGNOSIS — Z91.09: ICD-10-CM

## 2023-07-05 DIAGNOSIS — E55.9: ICD-10-CM

## 2023-07-05 DIAGNOSIS — Z88.8: ICD-10-CM

## 2023-07-05 LAB
BASOPHILS # BLD AUTO: 0 10*3/UL (ref 0–0.1)
BASOPHILS NFR BLD AUTO: 0.6 % (ref 0–1)
BUN SERPL-MCNC: 15 MG/DL (ref 9–23)
CHLORIDE SERPL-SCNC: 105 MMOL/L (ref 98–107)
EOSINOPHIL # BLD AUTO: 0.2 10*3/UL (ref 0–0.4)
EOSINOPHIL # BLD AUTO: 3.9 % (ref 1–4)
ERYTHROCYTE [DISTWIDTH] IN BLOOD BY AUTOMATED COUNT: 13.1 % (ref 0–14.5)
HCT VFR BLD AUTO: 38.8 % (ref 42–52)
LYMPHOCYTES # BLD AUTO: 0.7 10*3/UL (ref 1.3–4.4)
LYMPHOCYTES NFR BLD AUTO: 12.5 % (ref 27–41)
MCH RBC QN AUTO: 28.7 PG (ref 27–31)
MCHC RBC AUTO-ENTMCNC: 33.2 G/DL (ref 33–37)
MCV RBC AUTO: 86.2 FL (ref 80–94)
MONOCYTES # BLD AUTO: 0.4 10*3/UL (ref 0.1–1)
MONOCYTES NFR BLD MANUAL: 8.5 % (ref 3–9)
NEUT #: 3.8 10*3/UL (ref 2.3–7.9)
NEUT %: 74.1 % (ref 47–73)
NRBC BLD QL AUTO: 0 % (ref 0–0)
PLATELET # BLD AUTO: 220 10*3/UL (ref 130–400)
PMV BLD AUTO: 10.1 FL (ref 9.6–12.3)
POTASSIUM SERPL-SCNC: 4.8 MMOL/L (ref 3.4–5.1)
RBC # BLD AUTO: 4.5 10*6/UL (ref 4.5–5.9)
WBC NRBC COR # BLD AUTO: 5.2 10*3/UL (ref 4.8–10.8)

## 2023-07-11 LAB — QT INTERVAL: 383 MS

## 2023-08-22 RX ORDER — DAPAGLIFLOZIN 10 MG/1
TABLET, FILM COATED ORAL
Qty: 90 TABLET | Refills: 4 | Status: SHIPPED | OUTPATIENT
Start: 2023-08-22

## 2023-09-19 ENCOUNTER — LAB (OUTPATIENT)
Dept: LAB | Facility: HOSPITAL | Age: 68
End: 2023-09-19
Payer: COMMERCIAL

## 2023-09-19 DIAGNOSIS — E11.65 TYPE 2 DIABETES MELLITUS WITH HYPERGLYCEMIA, WITH LONG-TERM CURRENT USE OF INSULIN: ICD-10-CM

## 2023-09-19 DIAGNOSIS — Z79.4 TYPE 2 DIABETES MELLITUS WITH HYPERGLYCEMIA, WITH LONG-TERM CURRENT USE OF INSULIN: ICD-10-CM

## 2023-09-19 LAB
ALBUMIN SERPL-MCNC: 4.6 G/DL (ref 3.5–5.2)
ALBUMIN UR-MCNC: 2.4 MG/DL
ALBUMIN/GLOB SERPL: 1.5 G/DL
ALP SERPL-CCNC: 73 U/L (ref 39–117)
ALT SERPL W P-5'-P-CCNC: 30 U/L (ref 1–41)
ANION GAP SERPL CALCULATED.3IONS-SCNC: 11.6 MMOL/L (ref 5–15)
AST SERPL-CCNC: 42 U/L (ref 1–40)
BILIRUB SERPL-MCNC: 0.4 MG/DL (ref 0–1.2)
BUN SERPL-MCNC: 20 MG/DL (ref 8–23)
BUN/CREAT SERPL: 16.9 (ref 7–25)
CALCIUM SPEC-SCNC: 10 MG/DL (ref 8.6–10.5)
CHLORIDE SERPL-SCNC: 110 MMOL/L (ref 98–107)
CHOLEST SERPL-MCNC: 153 MG/DL (ref 0–200)
CO2 SERPL-SCNC: 25.4 MMOL/L (ref 22–29)
CREAT SERPL-MCNC: 1.18 MG/DL (ref 0.76–1.27)
CREAT UR-MCNC: 138.4 MG/DL
EGFRCR SERPLBLD CKD-EPI 2021: 68.5 ML/MIN/1.73
GLOBULIN UR ELPH-MCNC: 3 GM/DL
GLUCOSE SERPL-MCNC: 83 MG/DL (ref 65–99)
HBA1C MFR BLD: 6.8 % (ref 4.8–5.6)
HDLC SERPL-MCNC: 30 MG/DL (ref 40–60)
LDLC SERPL CALC-MCNC: 89 MG/DL (ref 0–100)
LDLC/HDLC SERPL: 2.77 {RATIO}
MICROALBUMIN/CREAT UR: 17.3 MG/G
POTASSIUM SERPL-SCNC: 4.5 MMOL/L (ref 3.5–5.2)
PROT SERPL-MCNC: 7.6 G/DL (ref 6–8.5)
SODIUM SERPL-SCNC: 147 MMOL/L (ref 136–145)
TRIGL SERPL-MCNC: 200 MG/DL (ref 0–150)
VLDLC SERPL-MCNC: 34 MG/DL (ref 5–40)

## 2023-09-19 PROCEDURE — 82043 UR ALBUMIN QUANTITATIVE: CPT

## 2023-09-19 PROCEDURE — 80061 LIPID PANEL: CPT

## 2023-09-19 PROCEDURE — 80053 COMPREHEN METABOLIC PANEL: CPT

## 2023-09-19 PROCEDURE — 83036 HEMOGLOBIN GLYCOSYLATED A1C: CPT

## 2023-09-19 PROCEDURE — 82570 ASSAY OF URINE CREATININE: CPT

## 2023-09-19 PROCEDURE — 36415 COLL VENOUS BLD VENIPUNCTURE: CPT

## 2023-09-25 PROBLEM — K64.8 OTHER HEMORRHOIDS: Status: ACTIVE | Noted: 2017-11-22

## 2023-09-25 PROBLEM — R74.01 ELEVATED TRANSAMINASE LEVEL: Status: ACTIVE | Noted: 2023-09-25

## 2023-09-25 PROBLEM — K63.5 POLYP OF COLON: Status: ACTIVE | Noted: 2023-04-05

## 2023-09-25 PROBLEM — R19.7 DIARRHEA: Status: ACTIVE | Noted: 2023-09-25

## 2023-09-25 PROBLEM — Z86.0100 HISTORY OF COLONIC POLYPS: Status: ACTIVE | Noted: 2023-09-25

## 2023-09-25 PROBLEM — R74.8 ELEVATED AMYLASE: Status: ACTIVE | Noted: 2023-09-25

## 2023-09-25 PROBLEM — K21.9 GASTRO-ESOPHAGEAL REFLUX DISEASE WITHOUT ESOPHAGITIS: Status: ACTIVE | Noted: 2023-09-25

## 2023-09-25 PROBLEM — K57.30 DVRTCLOS OF LG INT W/O PERFORATION OR ABSCESS W/O BLEEDING: Status: ACTIVE | Noted: 2017-11-22

## 2023-09-25 PROBLEM — Z12.11 ENCOUNTER FOR SCREENING FOR MALIGNANT NEOPLASM OF COLON: Status: ACTIVE | Noted: 2017-11-22

## 2023-09-25 PROBLEM — D12.0 BENIGN NEOPLASM OF CECUM: Status: ACTIVE | Noted: 2017-11-22

## 2023-09-25 PROBLEM — Z86.010 HISTORY OF COLONIC POLYPS: Status: ACTIVE | Noted: 2023-09-25

## 2023-09-25 PROBLEM — M54.9 BACK PAIN: Status: ACTIVE | Noted: 2023-09-25

## 2023-09-25 PROBLEM — K62.1 RECTAL POLYP: Status: ACTIVE | Noted: 2017-11-22

## 2023-09-25 PROBLEM — K62.89 OTHER SPECIFIED DISEASES OF ANUS AND RECTUM: Status: ACTIVE | Noted: 2017-11-22

## 2023-09-25 PROBLEM — R10.9 RIGHT SIDED ABDOMINAL PAIN: Status: ACTIVE | Noted: 2023-09-25

## 2023-09-25 PROBLEM — E78.00 PURE HYPERCHOLESTEROLEMIA: Status: ACTIVE | Noted: 2023-09-25

## 2023-09-25 RX ORDER — CEPHALEXIN 500 MG/1
CAPSULE ORAL
COMMUNITY
Start: 2023-07-31 | End: 2023-09-26

## 2023-09-25 RX ORDER — TAMSULOSIN HYDROCHLORIDE 0.4 MG/1
CAPSULE ORAL
COMMUNITY
Start: 2023-07-31 | End: 2023-09-26

## 2023-09-25 RX ORDER — CYCLOBENZAPRINE HCL 10 MG
TABLET ORAL
COMMUNITY
Start: 2023-09-15 | End: 2023-09-26

## 2023-09-26 ENCOUNTER — OFFICE VISIT (OUTPATIENT)
Dept: ENDOCRINOLOGY | Facility: CLINIC | Age: 68
End: 2023-09-26
Payer: COMMERCIAL

## 2023-09-26 VITALS
BODY MASS INDEX: 32.15 KG/M2 | HEIGHT: 65 IN | DIASTOLIC BLOOD PRESSURE: 84 MMHG | WEIGHT: 193 LBS | HEART RATE: 59 BPM | SYSTOLIC BLOOD PRESSURE: 128 MMHG | OXYGEN SATURATION: 99 %

## 2023-09-26 DIAGNOSIS — E11.65 TYPE 2 DIABETES MELLITUS WITH HYPERGLYCEMIA, WITH LONG-TERM CURRENT USE OF INSULIN: Primary | ICD-10-CM

## 2023-09-26 DIAGNOSIS — E78.2 MIXED HYPERLIPIDEMIA: ICD-10-CM

## 2023-09-26 DIAGNOSIS — E11.42 DIABETIC PERIPHERAL NEUROPATHY: ICD-10-CM

## 2023-09-26 DIAGNOSIS — I10 ESSENTIAL HYPERTENSION: ICD-10-CM

## 2023-09-26 DIAGNOSIS — Z79.4 TYPE 2 DIABETES MELLITUS WITH HYPERGLYCEMIA, WITH LONG-TERM CURRENT USE OF INSULIN: Primary | ICD-10-CM

## 2023-09-26 PROCEDURE — 99214 OFFICE O/P EST MOD 30 MIN: CPT | Performed by: INTERNAL MEDICINE

## 2023-09-26 RX ORDER — DAPAGLIFLOZIN 10 MG/1
1 TABLET, FILM COATED ORAL DAILY
Qty: 90 TABLET | Refills: 4 | Status: SHIPPED | OUTPATIENT
Start: 2023-09-26

## 2023-09-26 RX ORDER — INSULIN DEGLUDEC 200 U/ML
120 INJECTION, SOLUTION SUBCUTANEOUS DAILY
Qty: 45 ML | Refills: 6 | Status: SHIPPED | OUTPATIENT
Start: 2023-09-26

## 2023-09-26 RX ORDER — ROSUVASTATIN CALCIUM 10 MG/1
10 TABLET, COATED ORAL DAILY
Qty: 90 TABLET | Refills: 3 | Status: SHIPPED | OUTPATIENT
Start: 2023-09-26

## 2023-09-26 RX ORDER — ENALAPRIL MALEATE 20 MG/1
20 TABLET ORAL DAILY
Qty: 90 TABLET | Refills: 4 | Status: SHIPPED | OUTPATIENT
Start: 2023-09-26

## 2023-09-26 RX ORDER — SEMAGLUTIDE 2.68 MG/ML
2 INJECTION, SOLUTION SUBCUTANEOUS WEEKLY
Qty: 9 ML | Refills: 3 | Status: SHIPPED | OUTPATIENT
Start: 2023-09-26

## 2023-09-26 NOTE — PROGRESS NOTES
Endocrine Progress Note Outpatient     Patient Care Team:  Caesar Limon MD as PCP - General (Family Medicine)    Chief Complaint: Follow-up type 2 diabetes    HPI: 67-year-old male with history of type 2 diabetes, hypertension, hyperlipidemia is here for follow-up.    For type 2 diabetes: He is currently on Farxiga 10 once a day, Ozempic 2 mg subcu weekly, and Tresiba 120 units subcu daily. He is tolerating it well.  He is working on his diet. He had to stop Metformin due to GI side effects. He was on extended release Metformin. Denies low blood sugars.  He did have diabetes education back in [place] in .  He has been trying to work on his diet the best he can.  Does not have any significant issues with low blood sugars.    Hypertension: Well-controlled.    Hyperlipidemia: On simvastatin.    Diabetic peripheral neuropathy: On vitamin D and B12 supplementation.    Obesity: Has lost about 6 pounds of weight since last seen.    He has been diagnosed with a stage I prostate cancer and working with [facility] urology at this time.      Past Medical History:   Diagnosis Date    Angina pectoris     Diabetes mellitus     type 2    Hyperlipidemia     Hypertension     Type 2 diabetes mellitus        Social History     Socioeconomic History    Marital status:      Spouse name: Odette    Number of children: 0    Years of education: 14   Tobacco Use    Smoking status: Former     Packs/day: 1.50     Years: 15.00     Pack years: 22.50     Types: Cigarettes     Quit date:      Years since quittin.7    Smokeless tobacco: Never    Tobacco comments:     Non-smoker for at least 8 yrs   Vaping Use    Vaping Use: Never used   Substance and Sexual Activity    Alcohol use: No    Drug use: No    Sexual activity: Yes     Partners: Female     Birth control/protection: Condom     Comment: Sex only with spouse       Family History   Problem Relation Age of Onset    Cancer Father     Diabetes Brother     Hyperlipidemia Mother      Diabetes Maternal Uncle        No Known Allergies    ROS:   Constitutional:  Denies fatigue, tiredness.    Eyes:  Denies change in visual acuity   HENT:  Denies nasal congestion or sore throat   Respiratory: denies cough, shortness of breath.   Cardiovascular:  denies chest pain, edema   GI:  Denies abdominal pain, nausea, vomiting.   Musculoskeletal:  Denies back pain or joint pain   Integument:  Denies dry skin and rash   Neurologic:  Denies headache, focal weakness or sensory changes   Endocrine:  Denies polyuria or polydipsia   Psychiatric:  Denies depression or anxiety      Vitals:    09/26/23 0856   BP: 128/84   Pulse: 59   SpO2: 99%     BMI: 32.1    Physical Exam:  GEN: NAD, conversant, Obese  EYES: EOMI,   NECK: no thyromegaly,  CV: RRR,  LUNG: CTA  PSYCH: AOX3, appropriate mood, affect normal      Results Review:     I reviewed the patient's new clinical results.    Lab Results   Component Value Date    HGBA1C 6.80 (H) 09/19/2023    HGBA1C 7.20 (H) 06/24/2023    HGBA1C 8.8 (H) 02/21/2023      Lab Results   Component Value Date    GLUCOSE 83 09/19/2023    BUN 20 09/19/2023    CREATININE 1.18 09/19/2023    EGFRIFNONA 71 09/20/2021    BCR 16.9 09/19/2023    K 4.5 09/19/2023    CO2 25.4 09/19/2023    CALCIUM 10.0 09/19/2023    ALBUMIN 4.6 09/19/2023    LABIL2 1.4 03/14/2019    AST 42 (H) 09/19/2023    ALT 30 09/19/2023    CHOL 153 09/19/2023    TRIG 200 (H) 09/19/2023    LDL 89 09/19/2023    HDL 30 (L) 09/19/2023     Lab Results   Component Value Date    TSH 3.350 02/21/2023    FREET4 0.93 02/21/2023    THYROIDAB <9 02/21/2023         Medication Review: Reviewed.       Current Outpatient Medications:     Cyanocobalamin (VITAMIN B-12) 1000 MCG sublingual tablet, Place 1,000 mcg under the tongue Daily., Disp: 100 each, Rfl: 4    enalapril (VASOTEC) 20 MG tablet, Take 1 tablet by mouth Daily., Disp: 90 tablet, Rfl: 4    Farxiga 10 MG tablet, TAKE 1 TABLET BY MOUTH DAILY., Disp: 90 tablet, Rfl: 4    Insulin  Degludec (Tresiba FlexTouch) 200 UNIT/ML solution pen-injector pen injection, Inject 120 Units under the skin into the appropriate area as directed Daily., Disp: , Rfl:     omeprazole (priLOSEC) 20 MG capsule, Take 1 capsule by mouth Daily. before a meal, Disp: 30 capsule, Rfl: 5    rosuvastatin (CRESTOR) 10 MG tablet, Take 1 tablet by mouth Daily., Disp: , Rfl:     Semaglutide, 2 MG/DOSE, (Ozempic, 2 MG/DOSE,) 8 MG/3ML solution pen-injector, Inject 2 mg under the skin into the appropriate area as directed 1 (One) Time Per Week. Thursday, Disp: , Rfl:     topiramate (TOPAMAX) 100 MG tablet, Take 1 tablet by mouth 2 (Two) Times a Day., Disp: , Rfl:       Assessment & Plan   1.  Diabetes mellitus type 2 with Hyperglycemia: Overall doing well with A1c of 6.8% and no significant issues with low blood sugars.  At this time I will continue Ozempic 2 mg once a week along with Farxiga 10 mg once a day and insulin Tresiba 120 units subcu daily.  Recommend to continue to work on diet and activity and always keep glucose source in case of low blood sugars.    2.  Hypertension: Well-controlled, continue current medication    3.  Hyperlipidemia: Well-controlled, will continue Crestor 10 mg p.o. daily.    4.  Diabetic peripheral neuropathy: Improving.    5.  Obesity: He has been losing weight, encouraged to continue to work on diet and activity.    Assessment and plan from February 28, 2023 reviewed and updated.            Sheila Morales MD FACE.

## 2023-10-30 ENCOUNTER — TRANSCRIBE ORDERS (OUTPATIENT)
Dept: ADMINISTRATIVE | Facility: HOSPITAL | Age: 68
End: 2023-10-30
Payer: COMMERCIAL

## 2023-10-30 ENCOUNTER — LAB (OUTPATIENT)
Dept: LAB | Facility: HOSPITAL | Age: 68
End: 2023-10-30
Payer: COMMERCIAL

## 2023-10-30 DIAGNOSIS — R97.20 ELEVATED PROSTATE SPECIFIC ANTIGEN (PSA): ICD-10-CM

## 2023-10-30 DIAGNOSIS — N40.1 BENIGN PROSTATIC HYPERPLASIA WITH LOWER URINARY TRACT SYMPTOMS, SYMPTOM DETAILS UNSPECIFIED: ICD-10-CM

## 2023-10-30 DIAGNOSIS — Z19.1 MALIGNANT NEOPLASM WITH HORMONE SENSITIVE STATUS: ICD-10-CM

## 2023-10-30 DIAGNOSIS — C61 MALIGNANT NEOPLASM OF PROSTATE: ICD-10-CM

## 2023-10-30 DIAGNOSIS — Z51.0 ENCOUNTER FOR RADIOTHERAPY: ICD-10-CM

## 2023-10-30 DIAGNOSIS — N40.1 BENIGN PROSTATIC HYPERPLASIA WITH LOWER URINARY TRACT SYMPTOMS, SYMPTOM DETAILS UNSPECIFIED: Primary | ICD-10-CM

## 2023-10-30 LAB — PSA SERPL-MCNC: 2.68 NG/ML (ref 0–4)

## 2023-10-30 PROCEDURE — 36415 COLL VENOUS BLD VENIPUNCTURE: CPT

## 2023-10-30 PROCEDURE — 84153 ASSAY OF PSA TOTAL: CPT

## 2023-11-19 NOTE — PROGRESS NOTES
General Surgery History and Physical      Referring Provider: JOAQUIN Ross    Chief Complaint:    Pilonidal cyst    History of Present Illness:    Herb Mariscal is a 68 y.o. male who presents to the office for evaluation of recurrent abscesses in the perirectal/gluteal region.  He reports he has had abscesses over the last year and a half and they intermittently swell and rupture.  He has never had an incision and drainage.  They have all spontaneously ruptured on their own.  He reports drainage of purulent and bloody material.  Denies any fevers or chills.    Past Medical History:   Past Medical History:   Diagnosis Date    Angina pectoris     Diabetes mellitus     type 2    Hyperlipidemia     Hypertension     Kidney stones     Prostate cancer     Type 2 diabetes mellitus       Past Surgical History:    Past Surgical History:   Procedure Laterality Date    CYSTOSCOPY, URETEROSCOPY, RETROGRADE PYELOGRAM, STENT INSERTION Right 06/14/2023    Procedure: CYSTOSCOPY URETEROSCOPY, STONE BASKET EXTRACTION, RIGHT STENT INSERTION;  Surgeon: Han Garibay MD;  Location: [facility];  Service: Urology;  Laterality: Right;    CYSTOSCOPY, URETEROSCOPY, RETROGRADE PYELOGRAM, STENT INSERTION Right 06/26/2023    Procedure: CYSTOSCOPY URETEROSCOPY STENT INSERTION;  Surgeon: Ant Virk MD;  Location: [facility];  Service: Urology;  Laterality: Right;    HAND SURGERY  1990    HAND SURGERY       Family History:    Family History   Problem Relation Age of Onset    Cancer Father     Diabetes Brother     Hyperlipidemia Mother     Diabetes Maternal Uncle      Social History:    Social History     Socioeconomic History    Marital status:      Spouse name: Odette    Number of children: 0    Years of education: 14   Tobacco Use    Smoking status: Former     Packs/day: 1.50     Years: 15.00     Additional pack years: 0.00     Total pack years: 22.50     Types: Cigarettes     Quit date: 2010     Years since  quittin.8    Smokeless tobacco: Never    Tobacco comments:     Non-smoker for at least 8 yrs   Vaping Use    Vaping Use: Never used   Substance and Sexual Activity    Alcohol use: No    Drug use: No    Sexual activity: Yes     Partners: Female     Birth control/protection: Condom     Comment: Sex only with spouse     Allergies:   No Known Allergies    Medications:     Current Outpatient Medications:     Cyanocobalamin (VITAMIN B-12) 1000 MCG sublingual tablet, Place 1,000 mcg under the tongue Daily., Disp: 100 each, Rfl: 4    dapagliflozin Propanediol (Farxiga) 10 MG tablet, Take 10 mg by mouth Daily., Disp: 90 tablet, Rfl: 4    enalapril (VASOTEC) 20 MG tablet, Take 1 tablet by mouth Daily., Disp: 90 tablet, Rfl: 4    Insulin Degludec (Tresiba FlexTouch) 200 UNIT/ML solution pen-injector pen injection, Inject 120 Units under the skin into the appropriate area as directed Daily., Disp: 45 mL, Rfl: 6    omeprazole (priLOSEC) 20 MG capsule, Take 1 capsule by mouth Daily. before a meal, Disp: 30 capsule, Rfl: 5    rosuvastatin (CRESTOR) 10 MG tablet, Take 1 tablet by mouth Daily., Disp: 90 tablet, Rfl: 3    Semaglutide, 2 MG/DOSE, (Ozempic, 2 MG/DOSE,) 8 MG/3ML solution pen-injector, Inject 2 mg under the skin into the appropriate area as directed 1 (One) Time Per Week. Thursday, Disp: 9 mL, Rfl: 3    topiramate (TOPAMAX) 100 MG tablet, Take 1 tablet by mouth 2 (Two) Times a Day., Disp: , Rfl:     Radiology/Endoscopy:    Not applicable    Labs:    Recent labs reviewed    Review of Systems:   As noted above in HPI    Physical Exam:   No acute distress, alert  Nonlabored respirations  Gluteal crease with small midline pits but not evidence of pilonidal abscess  Perianal region with multiple areas of scarring from prior abscesses, several areas of pinpoint drainage of purulent material; most consistent with pilonidal disease  Extremities warm and well-perfused with no gross deformities    Assessment and Plan:  Herb  FRIDA Mariscal is a 68 y.o. male with perianal pain hidradenitis.    - We will try to treat with topical clindamycin cream for now  - If he does not respond to topical clinda we will try oral tetracycline  - Follow-up on an as-needed basis    Vandana Arenas MD  General Surgery

## 2023-11-20 ENCOUNTER — OFFICE VISIT (OUTPATIENT)
Dept: SURGERY | Facility: CLINIC | Age: 68
End: 2023-11-20
Payer: COMMERCIAL

## 2023-11-20 VITALS
DIASTOLIC BLOOD PRESSURE: 79 MMHG | SYSTOLIC BLOOD PRESSURE: 124 MMHG | HEART RATE: 78 BPM | HEIGHT: 65 IN | TEMPERATURE: 97.8 F | OXYGEN SATURATION: 95 % | BODY MASS INDEX: 32.82 KG/M2 | WEIGHT: 197 LBS

## 2023-11-20 DIAGNOSIS — L73.2 HIDRADENITIS SUPPURATIVA OF ANUS: Primary | ICD-10-CM

## 2023-11-20 PROCEDURE — 99204 OFFICE O/P NEW MOD 45 MIN: CPT | Performed by: SURGERY

## 2023-11-20 RX ORDER — CLINDAMYCIN PHOSPHATE 11.9 MG/ML
SOLUTION TOPICAL 2 TIMES DAILY
COMMUNITY
Start: 2023-11-02

## 2023-11-20 RX ORDER — TAMSULOSIN HYDROCHLORIDE 0.4 MG/1
1 CAPSULE ORAL DAILY
COMMUNITY
Start: 2023-10-31

## 2023-11-20 RX ORDER — CEPHALEXIN 500 MG/1
1 CAPSULE ORAL EVERY 12 HOURS SCHEDULED
COMMUNITY
Start: 2023-11-10

## 2023-11-20 RX ORDER — CLINDAMYCIN PHOSPHATE 10 MG/G
1 GEL TOPICAL 2 TIMES DAILY
Qty: 60 G | Refills: 3 | Status: SHIPPED | OUTPATIENT
Start: 2023-11-20

## 2023-11-28 ENCOUNTER — PATIENT ROUNDING (BHMG ONLY) (OUTPATIENT)
Dept: SURGERY | Facility: CLINIC | Age: 68
End: 2023-11-28
Payer: COMMERCIAL

## 2023-11-28 NOTE — PROGRESS NOTES
November 28, 2023    Hello, may I speak with Herb Mariscal?    My name is Susie      I am  with [facility]  [address] [place] [address].    Before we get started may I verify your date of birth? 1955    I am calling to officially welcome you to our practice and ask about your recent visit. Is this a good time to talk? yes    Tell me about your visit with us. What things went well?  patient stated that everything was great       We're always looking for ways to make our patients' experiences even better. Do you have recommendations on ways we may improve?  no    Overall were you satisfied with your first visit to our practice? yes       I appreciate you taking the time to speak with me today. Is there anything else I can do for you? no      Thank you, and have a great day.

## 2024-02-12 ENCOUNTER — LAB (OUTPATIENT)
Dept: LAB | Facility: HOSPITAL | Age: 69
End: 2024-02-12
Payer: MEDICARE

## 2024-02-12 ENCOUNTER — TRANSCRIBE ORDERS (OUTPATIENT)
Dept: ADMINISTRATIVE | Facility: HOSPITAL | Age: 69
End: 2024-02-12
Payer: COMMERCIAL

## 2024-02-12 DIAGNOSIS — C61 MALIGNANT NEOPLASM OF PROSTATE: Primary | ICD-10-CM

## 2024-02-12 DIAGNOSIS — C61 MALIGNANT NEOPLASM OF PROSTATE: ICD-10-CM

## 2024-02-12 LAB — PSA SERPL-MCNC: 1.32 NG/ML (ref 0–4)

## 2024-02-12 PROCEDURE — 84153 ASSAY OF PSA TOTAL: CPT

## 2024-02-12 PROCEDURE — 36415 COLL VENOUS BLD VENIPUNCTURE: CPT

## 2024-02-29 ENCOUNTER — TELEPHONE (OUTPATIENT)
Dept: ENDOCRINOLOGY | Facility: CLINIC | Age: 69
End: 2024-02-29

## 2024-02-29 NOTE — TELEPHONE ENCOUNTER
Received a covermymeds in regards to the medication Ozempic. At this time, this patient has been approved for the medication:    (Key: [id number])  Rx #: [id number]  Ozempic (2 MG/DOSE) 8MG/3ML pen-injectors  Form  [employer] Electronic PA Form (2017 NCPDP)  Created  2 months ago  Sent to Plan  Determination  Favorable  1 month ago

## 2024-04-23 ENCOUNTER — LAB (OUTPATIENT)
Dept: LAB | Facility: HOSPITAL | Age: 69
End: 2024-04-23
Payer: MEDICARE

## 2024-04-23 DIAGNOSIS — Z79.4 TYPE 2 DIABETES MELLITUS WITH HYPERGLYCEMIA, WITH LONG-TERM CURRENT USE OF INSULIN: ICD-10-CM

## 2024-04-23 DIAGNOSIS — E11.65 TYPE 2 DIABETES MELLITUS WITH HYPERGLYCEMIA, WITH LONG-TERM CURRENT USE OF INSULIN: ICD-10-CM

## 2024-04-23 LAB
ALBUMIN SERPL-MCNC: 4.2 G/DL (ref 3.5–5.2)
ALBUMIN UR-MCNC: <1.2 MG/DL
ALBUMIN/GLOB SERPL: 1.7 G/DL
ALP SERPL-CCNC: 72 U/L (ref 39–117)
ALT SERPL W P-5'-P-CCNC: 53 U/L (ref 1–41)
ANION GAP SERPL CALCULATED.3IONS-SCNC: 12.3 MMOL/L (ref 5–15)
AST SERPL-CCNC: 65 U/L (ref 1–40)
BILIRUB SERPL-MCNC: 0.3 MG/DL (ref 0–1.2)
BUN SERPL-MCNC: 23 MG/DL (ref 8–23)
BUN/CREAT SERPL: 15.1 (ref 7–25)
CALCIUM SPEC-SCNC: 9.6 MG/DL (ref 8.6–10.5)
CHLORIDE SERPL-SCNC: 108 MMOL/L (ref 98–107)
CHOLEST SERPL-MCNC: 128 MG/DL (ref 0–200)
CO2 SERPL-SCNC: 22.7 MMOL/L (ref 22–29)
CREAT SERPL-MCNC: 1.52 MG/DL (ref 0.76–1.27)
CREAT UR-MCNC: 66.7 MG/DL
EGFRCR SERPLBLD CKD-EPI 2021: 50.2 ML/MIN/1.73
GLOBULIN UR ELPH-MCNC: 2.5 GM/DL
GLUCOSE SERPL-MCNC: 138 MG/DL (ref 65–99)
HBA1C MFR BLD: 6.6 % (ref 4.8–5.6)
HDLC SERPL-MCNC: 28 MG/DL (ref 40–60)
LDLC SERPL CALC-MCNC: 64 MG/DL (ref 0–100)
LDLC/HDLC SERPL: 2.01 {RATIO}
MICROALBUMIN/CREAT UR: NORMAL MG/G{CREAT}
POTASSIUM SERPL-SCNC: 3.8 MMOL/L (ref 3.5–5.2)
PROT SERPL-MCNC: 6.7 G/DL (ref 6–8.5)
SODIUM SERPL-SCNC: 143 MMOL/L (ref 136–145)
TRIGL SERPL-MCNC: 218 MG/DL (ref 0–150)
TSH SERPL DL<=0.05 MIU/L-ACNC: 4.3 UIU/ML (ref 0.27–4.2)
VLDLC SERPL-MCNC: 36 MG/DL (ref 5–40)

## 2024-04-23 PROCEDURE — 84443 ASSAY THYROID STIM HORMONE: CPT

## 2024-04-23 PROCEDURE — 82043 UR ALBUMIN QUANTITATIVE: CPT

## 2024-04-23 PROCEDURE — 83036 HEMOGLOBIN GLYCOSYLATED A1C: CPT

## 2024-04-23 PROCEDURE — 80061 LIPID PANEL: CPT

## 2024-04-23 PROCEDURE — 82570 ASSAY OF URINE CREATININE: CPT

## 2024-04-23 PROCEDURE — 36415 COLL VENOUS BLD VENIPUNCTURE: CPT

## 2024-04-23 PROCEDURE — 80053 COMPREHEN METABOLIC PANEL: CPT

## 2024-04-30 ENCOUNTER — OFFICE VISIT (OUTPATIENT)
Dept: ENDOCRINOLOGY | Facility: CLINIC | Age: 69
End: 2024-04-30
Payer: MEDICARE

## 2024-04-30 VITALS
DIASTOLIC BLOOD PRESSURE: 70 MMHG | HEART RATE: 70 BPM | HEIGHT: 65 IN | OXYGEN SATURATION: 98 % | SYSTOLIC BLOOD PRESSURE: 120 MMHG | WEIGHT: 198 LBS | BODY MASS INDEX: 32.99 KG/M2

## 2024-04-30 DIAGNOSIS — R94.6 ABNORMAL THYROID FUNCTION TEST: ICD-10-CM

## 2024-04-30 DIAGNOSIS — E78.2 MIXED HYPERLIPIDEMIA: ICD-10-CM

## 2024-04-30 DIAGNOSIS — E11.65 TYPE 2 DIABETES MELLITUS WITH HYPERGLYCEMIA, WITH LONG-TERM CURRENT USE OF INSULIN: Primary | ICD-10-CM

## 2024-04-30 DIAGNOSIS — E11.42 DIABETIC PERIPHERAL NEUROPATHY: ICD-10-CM

## 2024-04-30 DIAGNOSIS — I10 ESSENTIAL HYPERTENSION: ICD-10-CM

## 2024-04-30 DIAGNOSIS — E66.09 CLASS 1 OBESITY DUE TO EXCESS CALORIES WITHOUT SERIOUS COMORBIDITY WITH BODY MASS INDEX (BMI) OF 32.0 TO 32.9 IN ADULT: ICD-10-CM

## 2024-04-30 DIAGNOSIS — Z79.4 TYPE 2 DIABETES MELLITUS WITH HYPERGLYCEMIA, WITH LONG-TERM CURRENT USE OF INSULIN: Primary | ICD-10-CM

## 2024-04-30 LAB — GLUCOSE BLDC GLUCOMTR-MCNC: 121 MG/DL (ref 70–105)

## 2024-04-30 PROCEDURE — 82948 REAGENT STRIP/BLOOD GLUCOSE: CPT | Performed by: INTERNAL MEDICINE

## 2024-04-30 PROCEDURE — 3044F HG A1C LEVEL LT 7.0%: CPT | Performed by: INTERNAL MEDICINE

## 2024-04-30 PROCEDURE — 1159F MED LIST DOCD IN RCRD: CPT | Performed by: INTERNAL MEDICINE

## 2024-04-30 PROCEDURE — 1160F RVW MEDS BY RX/DR IN RCRD: CPT | Performed by: INTERNAL MEDICINE

## 2024-04-30 PROCEDURE — 99214 OFFICE O/P EST MOD 30 MIN: CPT | Performed by: INTERNAL MEDICINE

## 2024-04-30 PROCEDURE — 3074F SYST BP LT 130 MM HG: CPT | Performed by: INTERNAL MEDICINE

## 2024-04-30 PROCEDURE — 3078F DIAST BP <80 MM HG: CPT | Performed by: INTERNAL MEDICINE

## 2024-04-30 RX ORDER — NIRMATRELVIR AND RITONAVIR 300-100 MG
KIT ORAL
COMMUNITY
Start: 2024-02-05

## 2024-04-30 RX ORDER — SYRINGE WITH NEEDLE, 1 ML 25GX5/8"
SYRINGE, EMPTY DISPOSABLE MISCELLANEOUS
COMMUNITY
Start: 2024-04-18

## 2024-04-30 RX ORDER — DAPAGLIFLOZIN 10 MG/1
1 TABLET, FILM COATED ORAL DAILY
Qty: 90 TABLET | Refills: 4 | Status: SHIPPED | OUTPATIENT
Start: 2024-04-30

## 2024-04-30 RX ORDER — SIMVASTATIN 40 MG
TABLET ORAL
COMMUNITY
Start: 2024-01-22

## 2024-04-30 RX ORDER — INSULIN DEGLUDEC 200 U/ML
120 INJECTION, SOLUTION SUBCUTANEOUS DAILY
Qty: 45 ML | Refills: 6 | Status: SHIPPED | OUTPATIENT
Start: 2024-04-30

## 2024-04-30 RX ORDER — ICOSAPENT ETHYL 500 MG/1
CAPSULE ORAL
COMMUNITY
Start: 2024-04-14

## 2024-04-30 NOTE — PATIENT INSTRUCTIONS
Continue current medication  Please pay attention to your diet and decrease red meat intake  Check labs before follow-up

## 2024-04-30 NOTE — PROGRESS NOTES
Endocrine Progress Note Outpatient     Patient Care Team:  Caesar Limon MD as PCP - General (Family Medicine)  Ant Virk MD as Consulting Physician (Urology)    Chief Complaint: Follow-up type 2 diabetes    HPI: 68-year-old male with history of type 2 diabetes, hypertension, hyperlipidemia is here for follow-up.    For type 2 diabetes: He is currently on Farxiga 10 once a day, Tresiba 120 units subcu daily. He is tolerating it well.  He is working on his diet.  He stopped Ozempic about 2 months ago due to the cost issues.  He had to stop Metformin due to GI side effects. He was on extended release Metformin. Denies low blood sugars.  He did have diabetes education back in [place] in .  He has been trying to work on his diet the best he can.  Does not have any significant issues with low blood sugars.    Hypertension: Well-controlled.    Hyperlipidemia: On simvastatin.    Diabetic peripheral neuropathy: On vitamin D and B12 supplementation.    Obesity: He has gained about 5 pounds of weight since last seen.    He has been diagnosed with a stage I prostate cancer and working with [facility] urology at this time.      Past Medical History:   Diagnosis Date    Angina pectoris     Colon polyp 2022    Diabetes mellitus     type 2    Fissure, anal     Hyperlipidemia     Hypertension     Kidney stones     Prostate cancer     Type 2 diabetes mellitus        Social History     Socioeconomic History    Marital status:      Spouse name: Odette    Number of children: 0    Years of education: 14   Tobacco Use    Smoking status: Former     Current packs/day: 0.00     Average packs/day: 1.5 packs/day for 15.0 years (22.5 ttl pk-yrs)     Types: Cigarettes     Start date: 1995     Quit date: 2010     Years since quittin.3    Smokeless tobacco: Never    Tobacco comments:     Non-smoker for at least 8 yrs   Vaping Use    Vaping status: Never Used   Substance and Sexual Activity    Alcohol use: No    Drug  "use: No    Sexual activity: Yes     Partners: Female     Birth control/protection: Condom     Comment: Sex only with spouse       Family History   Problem Relation Age of Onset    Cancer Father     Diabetes Brother     Hyperlipidemia Mother     Diabetes Maternal Uncle        No Known Allergies    ROS:   Constitutional:  Denies fatigue, tiredness.    Eyes:  Denies change in visual acuity   HENT:  Denies nasal congestion or sore throat   Respiratory: denies cough, shortness of breath.   Cardiovascular:  denies chest pain, edema   GI:  Denies abdominal pain, nausea, vomiting.   Musculoskeletal:  Denies back pain or joint pain   Integument:  Denies dry skin and rash   Neurologic:  Denies headache, focal weakness or sensory changes   Endocrine:  Denies polyuria or polydipsia   Psychiatric:  Denies depression or anxiety      Vitals:    04/30/24 0859   BP: 120/70   Pulse: 70   SpO2: 98%     BMI: 32.9    Physical Exam:  GEN: NAD, conversant, Obese  EYES: EOMI,   NECK: no thyromegaly,  CV: RRR,  LUNG: CTA  PSYCH: AOX3, appropriate mood, affect normal      Results Review:     I reviewed the patient's new clinical results.    Lab Results   Component Value Date    HGBA1C 6.60 (H) 04/23/2024    HGBA1C 6.80 (H) 09/19/2023    HGBA1C 7.20 (H) 06/24/2023      Lab Results   Component Value Date    GLUCOSE 138 (H) 04/23/2024    BUN 23 04/23/2024    CREATININE 1.52 (H) 04/23/2024    EGFRIFNONA 71 09/20/2021    BCR 15.1 04/23/2024    K 3.8 04/23/2024    CO2 22.7 04/23/2024    CALCIUM 9.6 04/23/2024    ALBUMIN 4.2 04/23/2024    LABIL2 1.4 03/14/2019    AST 65 (H) 04/23/2024    ALT 53 (H) 04/23/2024    CHOL 128 04/23/2024    TRIG 218 (H) 04/23/2024    LDL 64 04/23/2024    HDL 28 (L) 04/23/2024     Lab Results   Component Value Date    TSH 4.300 (H) 04/23/2024    FREET4 0.93 02/21/2023    THYROIDAB <9 02/21/2023         Medication Review: Reviewed.       Current Outpatient Medications:     B-D 3CC LUER-HIMANSHU SYR 23GX1\" 23G X 1\" 3 ML misc, , " Disp: , Rfl:     cephalexin (KEFLEX) 500 MG capsule, Take 1 capsule by mouth Every 12 (Twelve) Hours., Disp: , Rfl:     clindamycin (CLEOCIN T) 1 % external solution, Apply  topically to the appropriate area as directed 2 (Two) Times a Day., Disp: , Rfl:     clindamycin (Clindagel) 1 % gel, Apply 1 application  topically to the appropriate area as directed 2 (Two) Times a Day. Apply to juan-anal region BID, Disp: 60 g, Rfl: 3    Cyanocobalamin (VITAMIN B-12) 1000 MCG sublingual tablet, Place 1,000 mcg under the tongue Daily., Disp: 100 each, Rfl: 4    dapagliflozin Propanediol (Farxiga) 10 MG tablet, Take 10 mg by mouth Daily., Disp: 90 tablet, Rfl: 4    enalapril (VASOTEC) 20 MG tablet, Take 1 tablet by mouth Daily., Disp: 90 tablet, Rfl: 4    Insulin Degludec (Tresiba FlexTouch) 200 UNIT/ML solution pen-injector pen injection, Inject 120 Units under the skin into the appropriate area as directed Daily., Disp: 45 mL, Rfl: 6    omeprazole (priLOSEC) 20 MG capsule, Take 1 capsule by mouth Daily. before a meal, Disp: 30 capsule, Rfl: 5    Paxlovid, 300/100,, , Disp: , Rfl:     rosuvastatin (CRESTOR) 10 MG tablet, Take 1 tablet by mouth Daily., Disp: 90 tablet, Rfl: 3    simvastatin (ZOCOR) 40 MG tablet, , Disp: , Rfl:     tamsulosin (FLOMAX) 0.4 MG capsule 24 hr capsule, Take 1 capsule by mouth Daily., Disp: , Rfl:     topiramate (TOPAMAX) 100 MG tablet, Take 1 tablet by mouth 2 (Two) Times a Day., Disp: , Rfl:     Vascepa 0.5 g capsule, , Disp: , Rfl:       Assessment & Plan   1.  Diabetes mellitus type 2 with Hyperglycemia: Well-controlled with A1c at 6.6%.  He is off Ozempic due to cost.  Will continue Farxiga with Tresiba.  Recommend to continue to work on diet and activity and always keep glucose source in case of low blood sugars.     2.  Hypertension: Well-controlled, continue current medication    3.  Hyperlipidemia: Overall doing well triglycerides are mild high, he does enjoy red meat, he will try to cut  that down, will follow lipid panel.    4.  Diabetic peripheral neuropathy: Stable.    5.  Class I obesity with BMI of 32.9: Encouraged to continue to work on diet and activity.    6.  Abnormal thyroid function test: TSH is mild high, will follow TSH with free T4 and TPO to bodies.    Assessment and plan from September 26, 2023 reviewed and updated.            Sheila Morales MD FACE.

## 2024-05-02 ENCOUNTER — HOSPITAL ENCOUNTER (OUTPATIENT)
Dept: HOSPITAL 83 - RESCLI | Age: 69
Discharge: HOME | End: 2024-05-02
Attending: STUDENT IN AN ORGANIZED HEALTH CARE EDUCATION/TRAINING PROGRAM
Payer: COMMERCIAL

## 2024-05-02 DIAGNOSIS — E11.9: Primary | ICD-10-CM

## 2024-05-02 LAB
ALP SERPL-CCNC: 107 U/L (ref 46–116)
ALT SERPL W P-5'-P-CCNC: 11 U/L (ref 5–49)
BASOPHILS # BLD AUTO: 0.1 10*3/UL (ref 0–0.1)
BASOPHILS NFR BLD AUTO: 0.7 % (ref 0–1)
BUN SERPL-MCNC: 23 MG/DL (ref 9–23)
CHLORIDE SERPL-SCNC: 106 MMOL/L (ref 98–107)
CREAT UR-MCNC: 38.54 MG/DL
EOSINOPHIL # BLD AUTO: 0.2 10*3/UL (ref 0–0.4)
EOSINOPHIL # BLD AUTO: 3.3 % (ref 1–4)
ERYTHROCYTE [DISTWIDTH] IN BLOOD BY AUTOMATED COUNT: 13.4 % (ref 0–14.5)
HCT VFR BLD AUTO: 40.5 % (ref 42–52)
LYMPHOCYTES # BLD AUTO: 0.7 10*3/UL (ref 1.3–4.4)
LYMPHOCYTES NFR BLD AUTO: 10.6 % (ref 27–41)
MCH RBC QN AUTO: 28.8 PG (ref 27–31)
MCHC RBC AUTO-ENTMCNC: 32.1 G/DL (ref 33–37)
MCV RBC AUTO: 89.8 FL (ref 80–94)
MONOCYTES # BLD AUTO: 0.5 10*3/UL (ref 0.1–1)
MONOCYTES NFR BLD MANUAL: 6.9 % (ref 3–9)
NEUT #: 5.2 10*3/UL (ref 2.3–7.9)
NEUT %: 78.1 % (ref 47–73)
NRBC BLD QL AUTO: 0 % (ref 0–0)
PLATELET # BLD AUTO: 210 10*3/UL (ref 130–400)
PMV BLD AUTO: 10 FL (ref 9.6–12.3)
POTASSIUM SERPL-SCNC: 4.8 MMOL/L (ref 3.4–5.1)
PROT SERPL-MCNC: 7.5 GM/DL (ref 6–8)
RBC # BLD AUTO: 4.51 10*6/UL (ref 4.5–5.9)
WBC NRBC COR # BLD AUTO: 6.7 10*3/UL (ref 4.8–10.8)

## 2024-05-16 ENCOUNTER — LAB (OUTPATIENT)
Dept: LAB | Facility: HOSPITAL | Age: 69
End: 2024-05-16
Payer: MEDICARE

## 2024-05-16 ENCOUNTER — TRANSCRIBE ORDERS (OUTPATIENT)
Dept: LAB | Facility: HOSPITAL | Age: 69
End: 2024-05-16
Payer: MEDICARE

## 2024-05-16 DIAGNOSIS — C61 PROSTATE CANCER: Primary | ICD-10-CM

## 2024-05-16 DIAGNOSIS — C61 PROSTATE CANCER: ICD-10-CM

## 2024-05-16 LAB — PSA SERPL-MCNC: 1.13 NG/ML (ref 0–4)

## 2024-05-16 PROCEDURE — 36415 COLL VENOUS BLD VENIPUNCTURE: CPT

## 2024-05-16 PROCEDURE — 84153 ASSAY OF PSA TOTAL: CPT

## 2024-08-26 ENCOUNTER — HOSPITAL ENCOUNTER (OUTPATIENT)
Dept: HOSPITAL 83 - RESCLI | Age: 69
Discharge: HOME | End: 2024-08-26
Attending: INTERNAL MEDICINE
Payer: COMMERCIAL

## 2024-08-26 DIAGNOSIS — E66.9: ICD-10-CM

## 2024-08-26 DIAGNOSIS — Z98.890: ICD-10-CM

## 2024-08-26 DIAGNOSIS — E11.9: ICD-10-CM

## 2024-08-26 DIAGNOSIS — E03.9: ICD-10-CM

## 2024-08-26 DIAGNOSIS — R11.2: Primary | ICD-10-CM

## 2024-08-26 DIAGNOSIS — F32.A: ICD-10-CM

## 2024-08-26 DIAGNOSIS — Z79.899: ICD-10-CM

## 2024-08-26 DIAGNOSIS — Z91.048: ICD-10-CM

## 2024-08-26 DIAGNOSIS — E78.5: ICD-10-CM

## 2024-08-26 DIAGNOSIS — Z82.49: ICD-10-CM

## 2024-08-26 DIAGNOSIS — F41.9: ICD-10-CM

## 2024-08-26 DIAGNOSIS — Z88.8: ICD-10-CM

## 2024-08-26 LAB
ALP SERPL-CCNC: 103 U/L (ref 46–116)
ALT SERPL W P-5'-P-CCNC: 10 U/L (ref 5–49)
BASOPHILS # BLD AUTO: 0 10*3/UL (ref 0–0.1)
BASOPHILS NFR BLD AUTO: 0.7 % (ref 0–1)
BUN SERPL-MCNC: 29 MG/DL (ref 9–23)
CHLORIDE SERPL-SCNC: 105 MMOL/L (ref 98–107)
EOSINOPHIL # BLD AUTO: 0.2 10*3/UL (ref 0–0.4)
EOSINOPHIL # BLD AUTO: 4.2 % (ref 1–4)
ERYTHROCYTE [DISTWIDTH] IN BLOOD BY AUTOMATED COUNT: 12.7 % (ref 0–14.5)
HCT VFR BLD AUTO: 39.7 % (ref 42–52)
LYMPHOCYTES # BLD AUTO: 0.7 10*3/UL (ref 1.3–4.4)
LYMPHOCYTES NFR BLD AUTO: 12.8 % (ref 27–41)
MCH RBC QN AUTO: 28.8 PG (ref 27–31)
MCHC RBC AUTO-ENTMCNC: 32 G/DL (ref 33–37)
MCV RBC AUTO: 90 FL (ref 80–94)
MONOCYTES # BLD AUTO: 0.4 10*3/UL (ref 0.1–1)
MONOCYTES NFR BLD MANUAL: 7.6 % (ref 3–9)
NEUT #: 4.3 10*3/UL (ref 2.3–7.9)
NEUT %: 74.4 % (ref 47–73)
NRBC BLD QL AUTO: 0 % (ref 0–0)
PLATELET # BLD AUTO: 235 10*3/UL (ref 130–400)
PMV BLD AUTO: 9.6 FL (ref 9.6–12.3)
POTASSIUM SERPL-SCNC: 5.2 MMOL/L (ref 3.4–5.1)
PROT SERPL-MCNC: 7.4 GM/DL (ref 6–8)
RBC # BLD AUTO: 4.41 10*6/UL (ref 4.5–5.9)
WBC NRBC COR # BLD AUTO: 5.8 10*3/UL (ref 4.8–10.8)

## 2024-09-05 ENCOUNTER — HOSPITAL ENCOUNTER (OUTPATIENT)
Dept: HOSPITAL 83 - LAB | Age: 69
Discharge: HOME | End: 2024-09-05
Payer: COMMERCIAL

## 2024-09-05 DIAGNOSIS — R11.2: Primary | ICD-10-CM

## 2024-09-05 LAB
ALP SERPL-CCNC: 103 U/L (ref 46–116)
ALT SERPL W P-5'-P-CCNC: 11 U/L (ref 5–49)
BASOPHILS # BLD AUTO: 0 10*3/UL (ref 0–0.1)
BASOPHILS NFR BLD AUTO: 0.7 % (ref 0–1)
BUN SERPL-MCNC: 26 MG/DL (ref 9–23)
CHLORIDE SERPL-SCNC: 104 MMOL/L (ref 98–107)
EOSINOPHIL # BLD AUTO: 0.3 10*3/UL (ref 0–0.4)
EOSINOPHIL # BLD AUTO: 5.5 % (ref 1–4)
ERYTHROCYTE [DISTWIDTH] IN BLOOD BY AUTOMATED COUNT: 13.1 % (ref 0–14.5)
HCT VFR BLD AUTO: 41.6 % (ref 42–52)
LYMPHOCYTES # BLD AUTO: 0.7 10*3/UL (ref 1.3–4.4)
LYMPHOCYTES NFR BLD AUTO: 10.7 % (ref 27–41)
MCH RBC QN AUTO: 28.7 PG (ref 27–31)
MCHC RBC AUTO-ENTMCNC: 32.5 G/DL (ref 33–37)
MCV RBC AUTO: 88.5 FL (ref 80–94)
MONOCYTES # BLD AUTO: 0.4 10*3/UL (ref 0.1–1)
MONOCYTES NFR BLD MANUAL: 7.1 % (ref 3–9)
NEUT #: 4.6 10*3/UL (ref 2.3–7.9)
NEUT %: 75.7 % (ref 47–73)
NRBC BLD QL AUTO: 0 % (ref 0–0)
PLATELET # BLD AUTO: 211 10*3/UL (ref 130–400)
PMV BLD AUTO: 10 FL (ref 9.6–12.3)
POTASSIUM SERPL-SCNC: 5 MMOL/L (ref 3.4–5.1)
PROT SERPL-MCNC: 7.2 GM/DL (ref 6–8)
RBC # BLD AUTO: 4.7 10*6/UL (ref 4.5–5.9)
WBC NRBC COR # BLD AUTO: 6.1 10*3/UL (ref 4.8–10.8)

## 2024-11-04 ENCOUNTER — HOSPITAL ENCOUNTER (OUTPATIENT)
Dept: HOSPITAL 83 - RESCLI | Age: 69
Discharge: HOME | End: 2024-11-04
Attending: STUDENT IN AN ORGANIZED HEALTH CARE EDUCATION/TRAINING PROGRAM
Payer: COMMERCIAL

## 2024-11-04 DIAGNOSIS — Z91.048: ICD-10-CM

## 2024-11-04 DIAGNOSIS — E03.9: Primary | ICD-10-CM

## 2024-11-04 DIAGNOSIS — N39.41: ICD-10-CM

## 2024-11-04 DIAGNOSIS — R41.81: ICD-10-CM

## 2024-11-04 DIAGNOSIS — E78.2: ICD-10-CM

## 2024-11-04 DIAGNOSIS — Z86.718: ICD-10-CM

## 2024-11-04 DIAGNOSIS — F32.4: ICD-10-CM

## 2024-11-04 DIAGNOSIS — E11.51: ICD-10-CM

## 2024-11-04 DIAGNOSIS — Z79.82: ICD-10-CM

## 2024-11-04 DIAGNOSIS — Z98.890: ICD-10-CM

## 2024-11-04 DIAGNOSIS — Z88.8: ICD-10-CM

## 2024-11-04 DIAGNOSIS — R60.9: ICD-10-CM

## 2024-11-04 DIAGNOSIS — R42: ICD-10-CM

## 2024-11-04 DIAGNOSIS — Z79.899: ICD-10-CM

## 2024-11-04 DIAGNOSIS — Z91.09: ICD-10-CM

## 2024-11-04 DIAGNOSIS — R53.83: ICD-10-CM

## 2024-11-04 DIAGNOSIS — M62.838: ICD-10-CM

## 2024-11-04 DIAGNOSIS — I10: ICD-10-CM

## 2024-11-04 DIAGNOSIS — I82.461: ICD-10-CM

## 2024-11-11 ENCOUNTER — LAB (OUTPATIENT)
Dept: LAB | Facility: HOSPITAL | Age: 69
End: 2024-11-11
Payer: MEDICARE

## 2024-11-11 DIAGNOSIS — E11.65 TYPE 2 DIABETES MELLITUS WITH HYPERGLYCEMIA, WITH LONG-TERM CURRENT USE OF INSULIN: ICD-10-CM

## 2024-11-11 DIAGNOSIS — R94.6 ABNORMAL THYROID FUNCTION TEST: ICD-10-CM

## 2024-11-11 DIAGNOSIS — Z79.4 TYPE 2 DIABETES MELLITUS WITH HYPERGLYCEMIA, WITH LONG-TERM CURRENT USE OF INSULIN: ICD-10-CM

## 2024-11-11 LAB
ALBUMIN SERPL-MCNC: 3.9 G/DL (ref 3.5–5.2)
ALBUMIN UR-MCNC: <1.2 MG/DL
ALBUMIN/GLOB SERPL: 1.3 G/DL
ALP SERPL-CCNC: 76 U/L (ref 39–117)
ALT SERPL W P-5'-P-CCNC: 38 U/L (ref 1–41)
ANION GAP SERPL CALCULATED.3IONS-SCNC: 10.7 MMOL/L (ref 5–15)
AST SERPL-CCNC: 39 U/L (ref 1–40)
BILIRUB SERPL-MCNC: 0.4 MG/DL (ref 0–1.2)
BUN SERPL-MCNC: 15 MG/DL (ref 8–23)
BUN/CREAT SERPL: 12.4 (ref 7–25)
CALCIUM SPEC-SCNC: 9 MG/DL (ref 8.6–10.5)
CHLORIDE SERPL-SCNC: 112 MMOL/L (ref 98–107)
CHOLEST SERPL-MCNC: 118 MG/DL (ref 0–200)
CO2 SERPL-SCNC: 21.3 MMOL/L (ref 22–29)
CREAT SERPL-MCNC: 1.21 MG/DL (ref 0.76–1.27)
CREAT UR-MCNC: 112.7 MG/DL
EGFRCR SERPLBLD CKD-EPI 2021: 65.6 ML/MIN/1.73
GLOBULIN UR ELPH-MCNC: 3 GM/DL
GLUCOSE SERPL-MCNC: 107 MG/DL (ref 65–99)
HBA1C MFR BLD: 6.11 % (ref 4.8–5.6)
HDLC SERPL-MCNC: 28 MG/DL (ref 40–60)
LDLC SERPL CALC-MCNC: 50 MG/DL (ref 0–100)
LDLC/HDLC SERPL: 1.41 {RATIO}
MICROALBUMIN/CREAT UR: NORMAL MG/G{CREAT}
POTASSIUM SERPL-SCNC: 3.7 MMOL/L (ref 3.5–5.2)
PROT SERPL-MCNC: 6.9 G/DL (ref 6–8.5)
SODIUM SERPL-SCNC: 144 MMOL/L (ref 136–145)
T4 FREE SERPL-MCNC: 0.69 NG/DL (ref 0.92–1.68)
TRIGL SERPL-MCNC: 252 MG/DL (ref 0–150)
TSH SERPL DL<=0.05 MIU/L-ACNC: 3.2 UIU/ML (ref 0.27–4.2)
VLDLC SERPL-MCNC: 40 MG/DL (ref 5–40)

## 2024-11-11 PROCEDURE — 80061 LIPID PANEL: CPT

## 2024-11-11 PROCEDURE — 83036 HEMOGLOBIN GLYCOSYLATED A1C: CPT

## 2024-11-11 PROCEDURE — 36415 COLL VENOUS BLD VENIPUNCTURE: CPT

## 2024-11-11 PROCEDURE — 82043 UR ALBUMIN QUANTITATIVE: CPT

## 2024-11-11 PROCEDURE — 84443 ASSAY THYROID STIM HORMONE: CPT

## 2024-11-11 PROCEDURE — 82570 ASSAY OF URINE CREATININE: CPT

## 2024-11-11 PROCEDURE — 80053 COMPREHEN METABOLIC PANEL: CPT

## 2024-11-11 PROCEDURE — 86376 MICROSOMAL ANTIBODY EACH: CPT

## 2024-11-11 PROCEDURE — 84439 ASSAY OF FREE THYROXINE: CPT

## 2024-11-12 LAB — THYROPEROXIDASE AB SERPL-ACNC: <9 IU/ML (ref 0–34)

## 2024-11-13 RX ORDER — SEMAGLUTIDE 1.34 MG/ML
INJECTION, SOLUTION SUBCUTANEOUS
COMMUNITY
End: 2024-11-21

## 2024-11-13 RX ORDER — LIRAGLUTIDE 6 MG/ML
INJECTION SUBCUTANEOUS
COMMUNITY
End: 2024-11-21

## 2024-11-13 RX ORDER — SEMAGLUTIDE 2.68 MG/ML
INJECTION, SOLUTION SUBCUTANEOUS
COMMUNITY
Start: 2024-10-31

## 2024-11-13 RX ORDER — CLOTRIMAZOLE AND BETAMETHASONE DIPROPIONATE 10; .64 MG/G; MG/G
CREAM TOPICAL
COMMUNITY

## 2024-11-21 ENCOUNTER — OFFICE VISIT (OUTPATIENT)
Dept: ENDOCRINOLOGY | Facility: CLINIC | Age: 69
End: 2024-11-21
Payer: MEDICARE

## 2024-11-21 VITALS
SYSTOLIC BLOOD PRESSURE: 120 MMHG | DIASTOLIC BLOOD PRESSURE: 70 MMHG | HEIGHT: 65 IN | BODY MASS INDEX: 31.32 KG/M2 | OXYGEN SATURATION: 96 % | WEIGHT: 188 LBS | HEART RATE: 64 BPM

## 2024-11-21 DIAGNOSIS — E66.09 CLASS 1 OBESITY DUE TO EXCESS CALORIES WITHOUT SERIOUS COMORBIDITY WITH BODY MASS INDEX (BMI) OF 32.0 TO 32.9 IN ADULT: ICD-10-CM

## 2024-11-21 DIAGNOSIS — Z79.4 TYPE 2 DIABETES MELLITUS WITH HYPERGLYCEMIA, WITH LONG-TERM CURRENT USE OF INSULIN: Primary | ICD-10-CM

## 2024-11-21 DIAGNOSIS — E66.811 CLASS 1 OBESITY DUE TO EXCESS CALORIES WITHOUT SERIOUS COMORBIDITY WITH BODY MASS INDEX (BMI) OF 32.0 TO 32.9 IN ADULT: ICD-10-CM

## 2024-11-21 DIAGNOSIS — E78.2 MIXED HYPERLIPIDEMIA: ICD-10-CM

## 2024-11-21 DIAGNOSIS — E11.65 TYPE 2 DIABETES MELLITUS WITH HYPERGLYCEMIA, WITH LONG-TERM CURRENT USE OF INSULIN: Primary | ICD-10-CM

## 2024-11-21 DIAGNOSIS — R94.6 ABNORMAL THYROID FUNCTION TEST: ICD-10-CM

## 2024-11-21 DIAGNOSIS — E11.42 DIABETIC PERIPHERAL NEUROPATHY: ICD-10-CM

## 2024-11-21 DIAGNOSIS — I10 ESSENTIAL HYPERTENSION: ICD-10-CM

## 2024-11-21 LAB — GLUCOSE BLDC GLUCOMTR-MCNC: 100 MG/DL (ref 70–105)

## 2024-11-21 PROCEDURE — 82948 REAGENT STRIP/BLOOD GLUCOSE: CPT | Performed by: INTERNAL MEDICINE

## 2024-11-21 RX ORDER — ICOSAPENT ETHYL 500 MG/1
2 CAPSULE ORAL DAILY
Qty: 240 CAPSULE | Refills: 6 | Status: SHIPPED | OUTPATIENT
Start: 2024-11-21

## 2024-11-21 RX ORDER — INSULIN DEGLUDEC 200 U/ML
120 INJECTION, SOLUTION SUBCUTANEOUS DAILY
Qty: 45 ML | Refills: 6 | Status: SHIPPED | OUTPATIENT
Start: 2024-11-21

## 2024-11-21 RX ORDER — ERGOCALCIFEROL (VITAMIN D2) 50 MCG
2000 CAPSULE ORAL DAILY
Qty: 100 CAPSULE | Refills: 4 | Status: SHIPPED | OUTPATIENT
Start: 2024-11-21

## 2024-11-21 RX ORDER — ROSUVASTATIN CALCIUM 10 MG/1
10 TABLET, COATED ORAL DAILY
Qty: 90 TABLET | Refills: 3 | Status: SHIPPED | OUTPATIENT
Start: 2024-11-21

## 2024-11-21 RX ORDER — DAPAGLIFLOZIN 10 MG/1
1 TABLET, FILM COATED ORAL DAILY
Qty: 90 TABLET | Refills: 4 | Status: SHIPPED | OUTPATIENT
Start: 2024-11-21

## 2024-11-21 RX ORDER — ENALAPRIL MALEATE 20 MG/1
20 TABLET ORAL DAILY
Qty: 90 TABLET | Refills: 4 | Status: SHIPPED | OUTPATIENT
Start: 2024-11-21

## 2024-11-21 NOTE — PROGRESS NOTES
Endocrine Progress Note Outpatient     Patient Care Team:  Caesar Limon MD as PCP - General (Family Medicine)  Ant Virk MD as Consulting Physician (Urology)    Chief Complaint: Follow-up type 2 diabetes    HPI: 69-year-old male with history of type 2 diabetes, hypertension, hyperlipidemia is here for follow-up.    For type 2 diabetes: He is currently on Ozempic 2 mg subcu once a week, Farxiga 10 once a day, Tresiba 120 units subcu daily. He is tolerating his medications well and his blood sugars usually is less than 120 at home.  He has lost about 10 pounds of weight since last seen.    Hypertension: Well-controlled.    Hyperlipidemia: On rosuvastatin and Vascepa.    Diabetic peripheral neuropathy: On vitamin D and B12 supplementation.    Obesity: He has lost 10 pounds of weight since last seen.    He has been diagnosed with a stage I prostate cancer and working with [facility] urology at this time.      Past Medical History:   Diagnosis Date    Angina pectoris     Colon polyp 2022    Diabetes mellitus     type 2    Fissure, anal     Hyperlipidemia     Hypertension     Kidney stones     Prostate cancer     Type 2 diabetes mellitus        Social History     Socioeconomic History    Marital status:      Spouse name: Odette    Number of children: 0    Years of education: 14   Tobacco Use    Smoking status: Former     Current packs/day: 0.00     Average packs/day: 1.5 packs/day for 15.0 years (22.5 ttl pk-yrs)     Types: Cigarettes     Start date: 1995     Quit date: 2010     Years since quittin.8    Smokeless tobacco: Never    Tobacco comments:     Non-smoker for at least 8 yrs   Vaping Use    Vaping status: Never Used   Substance and Sexual Activity    Alcohol use: No    Drug use: No    Sexual activity: Yes     Partners: Female     Birth control/protection: Condom     Comment: Sex only with spouse       Family History   Problem Relation Age of Onset    Cancer Father     Diabetes Brother   "   Hyperlipidemia Mother     Diabetes Maternal Uncle        No Known Allergies    ROS:   Constitutional:  Denies fatigue, tiredness.    Eyes:  Denies change in visual acuity   HENT:  Denies nasal congestion or sore throat   Respiratory: denies cough, shortness of breath.   Cardiovascular:  denies chest pain, edema   GI:  Denies abdominal pain, nausea, vomiting.   Musculoskeletal:  Denies back pain or joint pain   Integument:  Denies dry skin and rash   Neurologic:  Denies headache, focal weakness or sensory changes   Endocrine:  Denies polyuria or polydipsia   Psychiatric:  Denies depression or anxiety      Vitals:    11/21/24 0756   BP: 120/70   Pulse: 64   SpO2: 96%     BMI: 31.3    Physical Exam:  GEN: NAD, conversant, Obese  EYES: EOMI,   NECK: no thyromegaly,  CV: RRR,  LUNG: CTA  PSYCH: AOX3, appropriate mood, affect normal      Results Review:     I reviewed the patient's new clinical results.    Lab Results   Component Value Date    HGBA1C 6.11 (H) 11/11/2024    HGBA1C 6.60 (H) 04/23/2024    HGBA1C 6.80 (H) 09/19/2023      Lab Results   Component Value Date    GLUCOSE 107 (H) 11/11/2024    BUN 15 11/11/2024    CREATININE 1.21 11/11/2024    EGFRIFNONA 71 09/20/2021    BCR 12.4 11/11/2024    K 3.7 11/11/2024    CO2 21.3 (L) 11/11/2024    CALCIUM 9.0 11/11/2024    ALBUMIN 3.9 11/11/2024    LABIL2 1.4 03/14/2019    AST 39 11/11/2024    ALT 38 11/11/2024    CHOL 118 11/11/2024    TRIG 252 (H) 11/11/2024    LDL 50 11/11/2024    HDL 28 (L) 11/11/2024     Lab Results   Component Value Date    TSH 3.200 11/11/2024    FREET4 0.69 (L) 11/11/2024    THYROIDAB <9 11/11/2024         Medication Review: Reviewed.       Current Outpatient Medications:     B-D 3CC LUER-HIMANSHU SYR 23GX1\" 23G X 1\" 3 ML misc, , Disp: , Rfl:     cephalexin (KEFLEX) 500 MG capsule, Take 1 capsule by mouth Every 12 (Twelve) Hours., Disp: , Rfl:     clindamycin (CLEOCIN T) 1 % external solution, Apply  topically to the appropriate area as directed 2 " (Two) Times a Day., Disp: , Rfl:     clindamycin (Clindagel) 1 % gel, Apply 1 application  topically to the appropriate area as directed 2 (Two) Times a Day. Apply to juan-anal region BID, Disp: 60 g, Rfl: 3    clotrimazole-betamethasone (LOTRISONE) 1-0.05 % cream, 30, Disp: , Rfl:     Cyanocobalamin (VITAMIN B-12) 1000 MCG sublingual tablet, Place 1,000 mcg under the tongue Daily., Disp: 100 each, Rfl: 4    dapagliflozin Propanediol (Farxiga) 10 MG tablet, Take 10 mg by mouth Daily., Disp: 90 tablet, Rfl: 4    enalapril (VASOTEC) 20 MG tablet, Take 1 tablet by mouth Daily., Disp: 90 tablet, Rfl: 4    Insulin Degludec (Tresiba FlexTouch) 200 UNIT/ML solution pen-injector pen injection, Inject 120 Units under the skin into the appropriate area as directed Daily., Disp: 45 mL, Rfl: 6    Liraglutide (Victoza) 18 MG/3ML solution pen-injector injection, 1 pen injector once a day, Disp: , Rfl:     omeprazole (priLOSEC) 20 MG capsule, Take 1 capsule by mouth Daily. before a meal, Disp: 30 capsule, Rfl: 5    Ozempic, 2 MG/DOSE, 8 MG/3ML solution pen-injector, , Disp: , Rfl:     Paxlovid, 300/100,, , Disp: , Rfl:     rosuvastatin (CRESTOR) 10 MG tablet, Take 1 tablet by mouth Daily., Disp: 90 tablet, Rfl: 3    simvastatin (ZOCOR) 40 MG tablet, , Disp: , Rfl:     tamsulosin (FLOMAX) 0.4 MG capsule 24 hr capsule, Take 1 capsule by mouth Daily., Disp: , Rfl:     topiramate (TOPAMAX) 100 MG tablet, Take 1 tablet by mouth 2 (Two) Times a Day., Disp: , Rfl:     Vascepa 0.5 g capsule, , Disp: , Rfl:     Semaglutide,0.25 or 0.5MG/DOS, (Ozempic, 0.25 or 0.5 MG/DOSE,) 2 MG/1.5ML solution pen-injector, 0, Disp: , Rfl:       Assessment & Plan   1.  Diabetes mellitus type 2 with Hyperglycemia: Well-controlled with A1c at 6.1%.  Has lost 10 pounds of weight since last seen.  Continue current management.  Will follow blood sugars and A1c.    2.  Hypertension: Well-controlled, continue current medication    3.  Hyperlipidemia: Overall doing  well triglycerides are mild high, he does enjoy red meat, he will try to cut that down, will follow lipid panel.  Currently on rosuvastatin which we will continue.    4.  Diabetic peripheral neuropathy: Will add vitamin D supplementation continue vitamin B12 supplementation.      5.  Class I obesity with BMI of 31.3: Has lost 10 pounds of weight since last seen.    6.  Abnormal thyroid function test: Repeat TSH is normal, free T4 mild low, will follow TSH.    Assessment and plan from April 30, 2024 reviewed and updated.            Sheila Morales MD FACE.

## 2024-11-21 NOTE — PATIENT INSTRUCTIONS
Continue current management  Start vitamin D at 2000 units p.o. daily  Continue to work on your diet and activity  Labs before follow-up.

## 2024-12-20 ENCOUNTER — OFFICE (AMBULATORY)
Dept: URBAN - METROPOLITAN AREA PATHOLOGY 19 | Facility: PATHOLOGY | Age: 69
End: 2024-12-20
Payer: MEDICARE

## 2024-12-20 ENCOUNTER — ON CAMPUS - OUTPATIENT (AMBULATORY)
Dept: URBAN - METROPOLITAN AREA HOSPITAL 2 | Facility: HOSPITAL | Age: 69
End: 2024-12-20
Payer: MEDICARE

## 2024-12-20 VITALS
HEART RATE: 76 BPM | RESPIRATION RATE: 16 BRPM | DIASTOLIC BLOOD PRESSURE: 72 MMHG | SYSTOLIC BLOOD PRESSURE: 106 MMHG | WEIGHT: 186 LBS | TEMPERATURE: 98.5 F | RESPIRATION RATE: 17 BRPM | DIASTOLIC BLOOD PRESSURE: 71 MMHG | SYSTOLIC BLOOD PRESSURE: 121 MMHG | DIASTOLIC BLOOD PRESSURE: 76 MMHG | OXYGEN SATURATION: 96 % | HEART RATE: 69 BPM | OXYGEN SATURATION: 95 % | DIASTOLIC BLOOD PRESSURE: 79 MMHG | RESPIRATION RATE: 15 BRPM | HEART RATE: 70 BPM | DIASTOLIC BLOOD PRESSURE: 90 MMHG | HEART RATE: 75 BPM | HEART RATE: 68 BPM | SYSTOLIC BLOOD PRESSURE: 123 MMHG | SYSTOLIC BLOOD PRESSURE: 120 MMHG | HEART RATE: 73 BPM | OXYGEN SATURATION: 97 % | DIASTOLIC BLOOD PRESSURE: 70 MMHG | RESPIRATION RATE: 14 BRPM | SYSTOLIC BLOOD PRESSURE: 142 MMHG | DIASTOLIC BLOOD PRESSURE: 74 MMHG | SYSTOLIC BLOOD PRESSURE: 118 MMHG | SYSTOLIC BLOOD PRESSURE: 126 MMHG | HEART RATE: 71 BPM | SYSTOLIC BLOOD PRESSURE: 128 MMHG | HEIGHT: 65 IN

## 2024-12-20 DIAGNOSIS — K63.5 POLYP OF COLON: ICD-10-CM

## 2024-12-20 DIAGNOSIS — D12.0 BENIGN NEOPLASM OF CECUM: ICD-10-CM

## 2024-12-20 DIAGNOSIS — Z09 ENCOUNTER FOR FOLLOW-UP EXAMINATION AFTER COMPLETED TREATMEN: ICD-10-CM

## 2024-12-20 DIAGNOSIS — Z86.0101 PERSONAL HISTORY OF ADENOMATOUS AND SERRATED COLON POLYPS: ICD-10-CM

## 2024-12-20 LAB
GI HISTOLOGY: A. CECUM: (no result)
GI HISTOLOGY: B. SIGMOID COLON: (no result)
GI HISTOLOGY: PDF REPORT: (no result)

## 2024-12-20 PROCEDURE — 45385 COLONOSCOPY W/LESION REMOVAL: CPT | Mod: PT | Performed by: INTERNAL MEDICINE

## 2024-12-20 PROCEDURE — 88305 TISSUE EXAM BY PATHOLOGIST: CPT | Mod: 26 | Performed by: PATHOLOGY

## 2024-12-20 RX ADMIN — SODIUM PHOSPHATE, DIBASIC AND SODIUM PHOSPHATE, MONOBASIC: 7; 19 ENEMA RECTAL at 08:55

## 2025-03-27 ENCOUNTER — HOSPITAL ENCOUNTER (OUTPATIENT)
Dept: HOSPITAL 83 - RESCLI | Age: 70
End: 2025-03-27
Attending: INTERNAL MEDICINE
Payer: MEDICARE

## 2025-03-27 DIAGNOSIS — Z88.8: ICD-10-CM

## 2025-03-27 DIAGNOSIS — R05.9: Primary | ICD-10-CM

## 2025-03-27 DIAGNOSIS — Z79.82: ICD-10-CM

## 2025-03-27 DIAGNOSIS — Z79.899: ICD-10-CM

## 2025-03-27 DIAGNOSIS — Z98.890: ICD-10-CM

## 2025-05-21 ENCOUNTER — LAB (OUTPATIENT)
Dept: LAB | Facility: HOSPITAL | Age: 70
End: 2025-05-21
Payer: MEDICARE

## 2025-05-21 ENCOUNTER — HOSPITAL ENCOUNTER (OUTPATIENT)
Dept: GENERAL RADIOLOGY | Facility: HOSPITAL | Age: 70
Discharge: HOME | End: 2025-05-21
Payer: MEDICARE

## 2025-05-21 ENCOUNTER — TRANSCRIBE ORDERS (OUTPATIENT)
Dept: ADMINISTRATIVE | Facility: HOSPITAL | Age: 70
End: 2025-05-21
Payer: MEDICARE

## 2025-05-21 DIAGNOSIS — C61 MALIGNANT NEOPLASM OF PROSTATE: ICD-10-CM

## 2025-05-21 DIAGNOSIS — N20.0 KIDNEY STONE: ICD-10-CM

## 2025-05-21 DIAGNOSIS — N20.0 KIDNEY STONE: Primary | ICD-10-CM

## 2025-05-21 PROCEDURE — 74018 RADEX ABDOMEN 1 VIEW: CPT

## 2025-05-21 PROCEDURE — 36415 COLL VENOUS BLD VENIPUNCTURE: CPT

## 2025-05-21 PROCEDURE — 84153 ASSAY OF PSA TOTAL: CPT

## 2025-05-21 PROCEDURE — 84154 ASSAY OF PSA FREE: CPT

## 2025-05-22 LAB
PSA FREE MFR SERPL: 20 %
PSA FREE SERPL-MCNC: 0.16 NG/ML
PSA SERPL-MCNC: 0.8 NG/ML (ref 0–4)

## 2025-07-02 ENCOUNTER — LAB (OUTPATIENT)
Dept: ENDOCRINOLOGY | Facility: CLINIC | Age: 70
End: 2025-07-02
Payer: MEDICARE

## 2025-07-11 ENCOUNTER — OFFICE VISIT (OUTPATIENT)
Dept: ENDOCRINOLOGY | Facility: CLINIC | Age: 70
End: 2025-07-11
Payer: MEDICARE

## 2025-07-11 VITALS
DIASTOLIC BLOOD PRESSURE: 70 MMHG | WEIGHT: 190 LBS | BODY MASS INDEX: 31.65 KG/M2 | HEART RATE: 73 BPM | OXYGEN SATURATION: 97 % | SYSTOLIC BLOOD PRESSURE: 122 MMHG | HEIGHT: 65 IN

## 2025-07-11 DIAGNOSIS — E78.2 MIXED HYPERLIPIDEMIA: ICD-10-CM

## 2025-07-11 DIAGNOSIS — E66.09 CLASS 1 OBESITY DUE TO EXCESS CALORIES WITHOUT SERIOUS COMORBIDITY WITH BODY MASS INDEX (BMI) OF 32.0 TO 32.9 IN ADULT: ICD-10-CM

## 2025-07-11 DIAGNOSIS — E11.65 TYPE 2 DIABETES MELLITUS WITH HYPERGLYCEMIA, WITH LONG-TERM CURRENT USE OF INSULIN: Primary | ICD-10-CM

## 2025-07-11 DIAGNOSIS — Z79.4 TYPE 2 DIABETES MELLITUS WITH HYPERGLYCEMIA, WITH LONG-TERM CURRENT USE OF INSULIN: Primary | ICD-10-CM

## 2025-07-11 DIAGNOSIS — E11.42 DIABETIC PERIPHERAL NEUROPATHY: ICD-10-CM

## 2025-07-11 DIAGNOSIS — I10 ESSENTIAL HYPERTENSION: ICD-10-CM

## 2025-07-11 DIAGNOSIS — E66.811 CLASS 1 OBESITY DUE TO EXCESS CALORIES WITHOUT SERIOUS COMORBIDITY WITH BODY MASS INDEX (BMI) OF 32.0 TO 32.9 IN ADULT: ICD-10-CM

## 2025-07-11 RX ORDER — SEMAGLUTIDE 2.68 MG/ML
2 INJECTION, SOLUTION SUBCUTANEOUS WEEKLY
Qty: 9 ML | Refills: 3 | Status: SHIPPED | OUTPATIENT
Start: 2025-07-11

## 2025-07-11 RX ORDER — ROSUVASTATIN CALCIUM 10 MG/1
10 TABLET, COATED ORAL DAILY
Qty: 90 TABLET | Refills: 3 | Status: SHIPPED | OUTPATIENT
Start: 2025-07-11

## 2025-07-11 RX ORDER — INSULIN DEGLUDEC 200 U/ML
120 INJECTION, SOLUTION SUBCUTANEOUS DAILY
Qty: 45 ML | Refills: 6 | Status: SHIPPED | OUTPATIENT
Start: 2025-07-11

## 2025-07-11 RX ORDER — DAPAGLIFLOZIN 10 MG/1
1 TABLET, FILM COATED ORAL DAILY
Qty: 90 TABLET | Refills: 4 | Status: SHIPPED | OUTPATIENT
Start: 2025-07-11

## 2025-07-11 NOTE — PROGRESS NOTES
Endocrine Progress Note Outpatient     Patient Care Team:  Caesar Limon MD as PCP - General (Family Medicine)  Ant Virk MD as Consulting Physician (Urology)    Chief Complaint: Follow-up type 2 diabetes    HPI: 69-year-old male with history of type 2 diabetes, hypertension, hyperlipidemia is here for follow-up.    For type 2 diabetes: He is currently on Ozempic 2 mg subcu once a week, Farxiga 10 once a day, Tresiba 120 units subcu daily. He is tolerating his medications well and his blood sugars usually is less than 150 at home.  Since last seen his weight is stable.    Hypertension: Well-controlled.    Hyperlipidemia: On rosuvastatin and Vascepa.    Diabetic peripheral neuropathy: On vitamin D and B12 supplementation.    Obesity: Weight is stable.    He has been diagnosed with a stage I prostate cancer and working with [facility] urology at this time.      Past Medical History:   Diagnosis Date    Angina pectoris     Colon polyp 2/2022    Diabetes mellitus     type 2    Fissure, anal     Hyperlipidemia     Hypertension     Kidney stones     Prostate cancer     Type 2 diabetes mellitus        Social History     Socioeconomic History    Marital status:      Spouse name: Odette    Number of children: 0    Years of education: 14   Tobacco Use    Smoking status: Former     Current packs/day: 0.00     Average packs/day: 1.5 packs/day for 15.0 years (22.5 ttl pk-yrs)     Types: Cigarettes     Start date: 1/1/1995     Quit date: 1/1/2010     Years since quitting: 15.5    Smokeless tobacco: Never    Tobacco comments:     Non-smoker for at least 8 yrs   Vaping Use    Vaping status: Never Used   Substance and Sexual Activity    Alcohol use: No    Drug use: No    Sexual activity: Yes     Partners: Female     Birth control/protection: Condom     Comment: Sex only with spouse       Family History   Problem Relation Age of Onset    Cancer Father     Diabetes Brother     Hyperlipidemia Mother     Diabetes Maternal  "Uncle        No Known Allergies    ROS:   Constitutional:  Denies fatigue, tiredness.    Eyes:  Denies change in visual acuity   HENT:  Denies nasal congestion or sore throat   Respiratory: denies cough, shortness of breath.   Cardiovascular:  denies chest pain, edema   GI:  Denies abdominal pain, nausea, vomiting.   Musculoskeletal:  Denies back pain or joint pain   Integument:  Denies dry skin and rash   Neurologic:  Denies headache, focal weakness or sensory changes   Endocrine:  Denies polyuria or polydipsia   Psychiatric:  Denies depression or anxiety      Vitals:    07/11/25 0810   BP: 122/70   Pulse: 73   SpO2: 97%     BMI: 31.6    Physical Exam:  GEN: NAD, conversant, Obese  EYES: EOMI,   NECK: no thyromegaly,  CV: RRR,  LUNG: CTA  PSYCH: AOX3, appropriate mood, affect normal      Results Review:     I reviewed the patient's new clinical results.    Lab Results   Component Value Date    HGBA1C 7.8 (H) 07/02/2025    HGBA1C 6.11 (H) 11/11/2024    HGBA1C 6.60 (H) 04/23/2024      Lab Results   Component Value Date    GLUCOSE 109 (H) 07/02/2025    BUN 28 (H) 07/02/2025    CREATININE 1.19 07/02/2025    EGFRIFNONA 71 09/20/2021    BCR 24 07/02/2025    K 4.1 07/02/2025    CO2 22 07/02/2025    CALCIUM 9.5 07/02/2025    ALBUMIN 4.6 07/02/2025    AST 29 07/02/2025    ALT 37 07/02/2025    CHOL 118 11/11/2024    TRIG 175 (H) 07/02/2025    LDL 81 07/02/2025    HDL 31 (L) 07/02/2025     Lab Results   Component Value Date    TSH 3.200 11/11/2024    FREET4 0.69 (L) 11/11/2024    THYROIDAB <9 11/11/2024         Medication Review: Reviewed.       Current Outpatient Medications:     B-D 3CC LUER-HIMANSHU SYR 23GX1\" 23G X 1\" 3 ML misc, , Disp: , Rfl:     cephalexin (KEFLEX) 500 MG capsule, Take 1 capsule by mouth Every 12 (Twelve) Hours., Disp: , Rfl:     clindamycin (CLEOCIN T) 1 % external solution, Apply  topically to the appropriate area as directed 2 (Two) Times a Day., Disp: , Rfl:     clindamycin (Clindagel) 1 % gel, Apply 1 " application  topically to the appropriate area as directed 2 (Two) Times a Day. Apply to juan-anal region BID, Disp: 60 g, Rfl: 3    Cyanocobalamin (VITAMIN B-12) 1000 MCG sublingual tablet, Place 1,000 mcg under the tongue Daily., Disp: 100 each, Rfl: 4    dapagliflozin Propanediol (Farxiga) 10 MG tablet, Take 10 mg by mouth Daily., Disp: 90 tablet, Rfl: 4    enalapril (VASOTEC) 20 MG tablet, Take 1 tablet by mouth Daily., Disp: 90 tablet, Rfl: 4    Insulin Degludec (Tresiba FlexTouch) 200 UNIT/ML solution pen-injector pen injection, Inject 120 Units under the skin into the appropriate area as directed Daily., Disp: 45 mL, Rfl: 6    omeprazole (priLOSEC) 20 MG capsule, Take 1 capsule by mouth Daily. before a meal, Disp: 30 capsule, Rfl: 5    Ozempic, 2 MG/DOSE, 8 MG/3ML solution pen-injector, , Disp: , Rfl:     rosuvastatin (CRESTOR) 10 MG tablet, Take 1 tablet by mouth Daily., Disp: 90 tablet, Rfl: 3    tamsulosin (FLOMAX) 0.4 MG capsule 24 hr capsule, Take 1 capsule by mouth Daily., Disp: , Rfl:     Vascepa 0.5 g capsule, Take 2 capsules by mouth Daily., Disp: 240 capsule, Rfl: 6    Vitamin D, Ergocalciferol, 50 MCG (2000 UT) capsule, Take 2,000 Units by mouth Daily., Disp: 100 capsule, Rfl: 4      Assessment & Plan   1.  Diabetes mellitus type 2 with Hyperglycemia: Uncontrolled with worsening of A1c at 7.8%.  He just told me that he has been taking his Ozempic every other week which may be the reason his A1c has gone up.  I have advised him to take Ozempic 2 mg once a week along with his Farxiga and Tresiba.  We did talk about changing him to Moun[employer] but he want to hold onto that until next time he may have a different insurance by then.  Advised to continue to work on diet and activity and always keep glucose source in case of low blood sugars.    2.  Hypertension: Well-controlled, continue current medication    3.  Hyperlipidemia: Overall doing well, continue rosuvastatin.    4.  Diabetic peripheral  neuropathy: Better. On Vit D and B12.      5.  Class I obesity: Encouraged to continue to work on diet and activity.    Assessment plan from November 21, 2024 reviewed and updated.                Sheila Morales MD FACE.

## 2025-07-11 NOTE — PATIENT INSTRUCTIONS
Please make sure you take your Ozempic 2 mg once a week subcu  Continue rest of the medication  Continue to work on your diet and activity  Stay hydrated  Labs before follow-up.

## 2025-07-14 ENCOUNTER — HOSPITAL ENCOUNTER (OUTPATIENT)
Dept: HOSPITAL 83 - LAB | Age: 70
Discharge: HOME | End: 2025-07-14
Attending: INTERNAL MEDICINE
Payer: MEDICARE

## 2025-07-14 DIAGNOSIS — R25.2: ICD-10-CM

## 2025-07-14 DIAGNOSIS — E03.9: ICD-10-CM

## 2025-07-14 DIAGNOSIS — Z79.899: ICD-10-CM

## 2025-07-14 DIAGNOSIS — R60.9: ICD-10-CM

## 2025-07-14 DIAGNOSIS — E78.2: ICD-10-CM

## 2025-07-14 DIAGNOSIS — Z79.82: ICD-10-CM

## 2025-07-14 DIAGNOSIS — R53.83: ICD-10-CM

## 2025-07-14 DIAGNOSIS — E11.9: Primary | ICD-10-CM

## 2025-07-14 DIAGNOSIS — Z88.8: ICD-10-CM

## 2025-07-14 DIAGNOSIS — R39.15: ICD-10-CM

## 2025-07-14 DIAGNOSIS — R41.81: ICD-10-CM

## 2025-07-14 DIAGNOSIS — Z98.890: ICD-10-CM

## 2025-07-14 DIAGNOSIS — F32.4: ICD-10-CM

## 2025-07-14 LAB
ALP SERPL-CCNC: 105 U/L (ref 46–116)
ALT SERPL W P-5'-P-CCNC: 10 U/L (ref 5–49)
APPEARANCE UR: CLEAR
BACTERIA #/AREA URNS HPF: (no result) /[HPF]
BASOPHILS # BLD AUTO: 0.1 10*3/UL (ref 0–0.1)
BASOPHILS NFR BLD AUTO: 0.8 % (ref 0–1)
BILIRUB UR QL STRIP: NEGATIVE
BUN SERPL-MCNC: 30 MG/DL (ref 9–23)
CASTS URNS QL MICRO: (no result)
CHLORIDE SERPL-SCNC: 106 MMOL/L (ref 98–107)
CHOLEST SERPL-MCNC: 134 MG/DL (ref ?–200)
COLOR UR: YELLOW
CRYSTALS URNS MICRO: (no result)
EOSINOPHIL # BLD AUTO: 0.5 10*3/UL (ref 0–0.4)
EOSINOPHIL # BLD AUTO: 7.1 % (ref 1–4)
EPI CELLS #/AREA URNS HPF: (no result) /[HPF]
ERYTHROCYTE [DISTWIDTH] IN BLOOD BY AUTOMATED COUNT: 13.2 % (ref 0–14.5)
GLUCOSE UR QL: (no result)
HCT VFR BLD AUTO: 40 % (ref 42–52)
HGB UR QL STRIP: NEGATIVE
KETONES UR QL STRIP: NEGATIVE
LDLC SERPL DIRECT ASSAY-MCNC: 69 MG/DL (ref 9–159)
LEUKOCYTE ESTERASE UR QL STRIP: NEGATIVE
MANUAL DIFFERENTIAL PERFORMED BLD QL: (no result)
MCH RBC QN AUTO: 28.9 PG (ref 27–31)
MCHC RBC AUTO-ENTMCNC: 32.3 G/DL (ref 33–37)
MCV RBC AUTO: 89.5 FL (ref 80–94)
MONOCYTES # BLD AUTO: 0.4 10*3/UL (ref 0.1–1)
MONOCYTES NFR BLD MANUAL: 6 % (ref 3–9)
MUCOUS THREADS URNS QL MICRO: (no result)
NEUT #: 4.8 10*3/UL (ref 2.3–7.9)
NEUTROPHILS NFR BLD AUTO: 75.1 % (ref 47–73)
NITRITE UR QL STRIP: NEGATIVE
NRBC BLD QL AUTO: 0 10*3/UL (ref 0–0)
PH UR STRIP: 5.5 [PH] (ref 4.5–8)
PLATELET # BLD AUTO: 191 10*3/UL (ref 130–400)
PMV BLD AUTO: 10.3 FL (ref 9.6–12.3)
POTASSIUM SERPL-SCNC: 5 MMOL/L (ref 3.4–5.1)
PROT SERPL-MCNC: 7.3 GM/DL (ref 6–8)
RBC # BLD AUTO: 4.47 10*6/UL (ref 4.5–5.9)
RBC #/AREA URNS HPF: (no result) RBC/HPF (ref 0–2)
SP GR UR: 1.02 (ref 1–1.03)
T VAGINALIS URNS QL MICRO: (no result)
TRIGL SERPL-MCNC: 99 MG/DL (ref ?–150)
UROBILINOGEN UR STRIP-MCNC: 0.2 E.U./DL (ref 0–1)
WBC #/AREA URNS HPF: (no result) WBC/HPF (ref 0–5)
WBC NRBC COR # BLD AUTO: 6.4 10*3/UL (ref 4.8–10.8)
YEAST #/AREA URNS HPF: (no result) /[HPF]

## 2025-07-29 ENCOUNTER — LAB (OUTPATIENT)
Dept: LAB | Facility: HOSPITAL | Age: 70
End: 2025-07-29
Payer: MEDICARE

## 2025-07-29 ENCOUNTER — HOSPITAL ENCOUNTER (OUTPATIENT)
Dept: CARDIOLOGY | Facility: HOSPITAL | Age: 70
Discharge: HOME | End: 2025-07-29
Payer: MEDICARE

## 2025-07-29 ENCOUNTER — TRANSCRIBE ORDERS (OUTPATIENT)
Dept: ADMINISTRATIVE | Facility: HOSPITAL | Age: 70
End: 2025-07-29
Payer: MEDICARE

## 2025-07-29 DIAGNOSIS — N20.0 RENAL CALCULI: ICD-10-CM

## 2025-07-29 DIAGNOSIS — N20.0 RENAL CALCULI: Primary | ICD-10-CM

## 2025-07-29 LAB
ANION GAP SERPL CALCULATED.3IONS-SCNC: 9.7 MMOL/L (ref 5–15)
BASOPHILS # BLD AUTO: 0.09 10*3/MM3 (ref 0–0.2)
BASOPHILS NFR BLD AUTO: 1 % (ref 0–1.5)
BUN SERPL-MCNC: 20 MG/DL (ref 8–23)
BUN/CREAT SERPL: 18.2 (ref 7–25)
CALCIUM SPEC-SCNC: 10 MG/DL (ref 8.6–10.5)
CHLORIDE SERPL-SCNC: 103 MMOL/L (ref 98–107)
CO2 SERPL-SCNC: 26.3 MMOL/L (ref 22–29)
CREAT SERPL-MCNC: 1.1 MG/DL (ref 0.76–1.27)
DEPRECATED RDW RBC AUTO: 40.8 FL (ref 37–54)
EGFRCR SERPLBLD CKD-EPI 2021: 73.6 ML/MIN/1.73
EOSINOPHIL # BLD AUTO: 0.32 10*3/MM3 (ref 0–0.4)
EOSINOPHIL NFR BLD AUTO: 3.6 % (ref 0.3–6.2)
ERYTHROCYTE [DISTWIDTH] IN BLOOD BY AUTOMATED COUNT: 12.3 % (ref 12.3–15.4)
GLUCOSE SERPL-MCNC: 171 MG/DL (ref 65–99)
HCT VFR BLD AUTO: 51.6 % (ref 37.5–51)
HGB BLD-MCNC: 16.6 G/DL (ref 13–17.7)
IMM GRANULOCYTES # BLD AUTO: 0.05 10*3/MM3 (ref 0–0.05)
IMM GRANULOCYTES NFR BLD AUTO: 0.6 % (ref 0–0.5)
LARGE PLATELETS: NORMAL
LYMPHOCYTES # BLD AUTO: 1.35 10*3/MM3 (ref 0.7–3.1)
LYMPHOCYTES NFR BLD AUTO: 15.2 % (ref 19.6–45.3)
MCH RBC QN AUTO: 29.2 PG (ref 26.6–33)
MCHC RBC AUTO-ENTMCNC: 32.2 G/DL (ref 31.5–35.7)
MCV RBC AUTO: 90.8 FL (ref 79–97)
MONOCYTES # BLD AUTO: 0.66 10*3/MM3 (ref 0.1–0.9)
MONOCYTES NFR BLD AUTO: 7.4 % (ref 5–12)
NEUTROPHILS NFR BLD AUTO: 6.39 10*3/MM3 (ref 1.7–7)
NEUTROPHILS NFR BLD AUTO: 72.2 % (ref 42.7–76)
NRBC BLD AUTO-RTO: 0 /100 WBC (ref 0–0.2)
PLATELET # BLD AUTO: 150 10*3/MM3 (ref 140–450)
PMV BLD AUTO: 11 FL (ref 6–12)
POTASSIUM SERPL-SCNC: 4.2 MMOL/L (ref 3.5–5.2)
QT INTERVAL: 391 MS
QTC INTERVAL: 404 MS
RBC # BLD AUTO: 5.68 10*6/MM3 (ref 4.14–5.8)
RBC MORPH BLD: NORMAL
SMALL PLATELETS BLD QL SMEAR: ADEQUATE
SODIUM SERPL-SCNC: 139 MMOL/L (ref 136–145)
WBC MORPH BLD: NORMAL
WBC NRBC COR # BLD AUTO: 8.86 10*3/MM3 (ref 3.4–10.8)

## 2025-07-29 PROCEDURE — 36415 COLL VENOUS BLD VENIPUNCTURE: CPT

## 2025-07-29 PROCEDURE — 85007 BL SMEAR W/DIFF WBC COUNT: CPT

## 2025-07-29 PROCEDURE — 85025 COMPLETE CBC W/AUTO DIFF WBC: CPT

## 2025-07-29 PROCEDURE — 80048 BASIC METABOLIC PNL TOTAL CA: CPT

## 2025-07-29 PROCEDURE — 93005 ELECTROCARDIOGRAM TRACING: CPT | Performed by: UROLOGY

## (undated) DEVICE — SOLUTION,WATER,IRRIGATION,1000ML,STERILE: Brand: MEDLINE

## (undated) DEVICE — PK CYSTO 50

## (undated) DEVICE — SOL IRRG H2O BG 3000ML STRL

## (undated) DEVICE — BLOCK BITE 60FR RUBBER ADLT DENTAL

## (undated) DEVICE — GRADUATE TRIANG MEASURE 1000ML BLK PRNT

## (undated) DEVICE — SNARE ENDOSCP L240CM LOOP W13MM SHTH DIA2.4MM SM OVL FLX

## (undated) DEVICE — NITINOL STONE RETRIEVAL BASKET: Brand: ZERO TIP

## (undated) DEVICE — PREP SPRY PVPI 10P 2OZ

## (undated) DEVICE — CATH URETRL OPN/END 5F70CM

## (undated) DEVICE — SPONGE GZ W4XL4IN RAYON POLY FILL CVR W/ NONWOVEN FAB

## (undated) DEVICE — SPHINCTEROTOME: Brand: DREAMTOME™ RX 44

## (undated) DEVICE — DUAL LUMEN URETERAL CATHETER

## (undated) DEVICE — GLV SURG SIGNATURE ESSENTIAL PF LTX SZ7

## (undated) DEVICE — ELECTRODE PT RET AD L9FT HI MOIST COND ADH HYDRGEL CORDED

## (undated) DEVICE — SYSTEM BX CAP BILI RAP EXCHG CAP LOK DEV COMPATIBLE W/ OLY

## (undated) DEVICE — NITINOL WIRE WITH HYDROPHILIC TIP: Brand: SENSOR

## (undated) DEVICE — KT SURG TURNOVER 050

## (undated) DEVICE — RETRIEVAL BALLOON CATHETER: Brand: EXTRACTOR™ PRO RX